# Patient Record
Sex: MALE | Race: WHITE | HISPANIC OR LATINO | Employment: UNEMPLOYED | URBAN - METROPOLITAN AREA
[De-identification: names, ages, dates, MRNs, and addresses within clinical notes are randomized per-mention and may not be internally consistent; named-entity substitution may affect disease eponyms.]

---

## 2017-10-01 ENCOUNTER — APPOINTMENT (EMERGENCY)
Dept: CT IMAGING | Facility: HOSPITAL | Age: 52
End: 2017-10-01
Payer: COMMERCIAL

## 2017-10-01 ENCOUNTER — HOSPITAL ENCOUNTER (EMERGENCY)
Facility: HOSPITAL | Age: 52
Discharge: HOME | End: 2017-10-01
Attending: EMERGENCY MEDICINE | Admitting: EMERGENCY MEDICINE
Payer: COMMERCIAL

## 2017-10-01 VITALS
BODY MASS INDEX: 47.2 KG/M2 | RESPIRATION RATE: 16 BRPM | HEART RATE: 78 BPM | DIASTOLIC BLOOD PRESSURE: 62 MMHG | TEMPERATURE: 97.3 F | HEIGHT: 64 IN | OXYGEN SATURATION: 97 % | SYSTOLIC BLOOD PRESSURE: 132 MMHG | WEIGHT: 276.46 LBS

## 2017-10-01 DIAGNOSIS — R10.9 ACUTE ABDOMINAL PAIN: Primary | ICD-10-CM

## 2017-10-01 LAB
ALBUMIN SERPL BCP-MCNC: 3.7 G/DL (ref 3.5–5)
ALP SERPL-CCNC: 77 U/L (ref 46–116)
ALT SERPL W P-5'-P-CCNC: 34 U/L (ref 12–78)
ANION GAP SERPL CALCULATED.3IONS-SCNC: 6 MMOL/L (ref 4–13)
AST SERPL W P-5'-P-CCNC: 10 U/L (ref 5–45)
BASOPHILS # BLD AUTO: 0.04 THOUSANDS/ΜL (ref 0–0.1)
BASOPHILS NFR BLD AUTO: 1 % (ref 0–1)
BILIRUB SERPL-MCNC: 0.2 MG/DL (ref 0.2–1)
BUN SERPL-MCNC: 15 MG/DL (ref 5–25)
CALCIUM SERPL-MCNC: 8.9 MG/DL (ref 8.3–10.1)
CHLORIDE SERPL-SCNC: 101 MMOL/L (ref 100–108)
CLARITY, POC: CLEAR
CO2 SERPL-SCNC: 28 MMOL/L (ref 21–32)
COLOR, POC: NORMAL
CREAT SERPL-MCNC: 1.27 MG/DL (ref 0.6–1.3)
EOSINOPHIL # BLD AUTO: 0.21 THOUSAND/ΜL (ref 0–0.61)
EOSINOPHIL NFR BLD AUTO: 3 % (ref 0–6)
ERYTHROCYTE [DISTWIDTH] IN BLOOD BY AUTOMATED COUNT: 13.2 % (ref 11.6–15.1)
EXT BILIRUBIN, UA: NEGATIVE
EXT BLOOD URINE: NEGATIVE
EXT GLUCOSE, UA: NEGATIVE
EXT KETONES: NEGATIVE
EXT NITRITE, UA: NEGATIVE
EXT PH, UA: 6
EXT PROTEIN, UA: NEGATIVE
EXT SPECIFIC GRAVITY, UA: 1.01
EXT UROBILINOGEN: 0.2
GFR SERPL CREATININE-BSD FRML MDRD: 65 ML/MIN/1.73SQ M
GLUCOSE SERPL-MCNC: 111 MG/DL (ref 65–140)
HCT VFR BLD AUTO: 44.4 % (ref 36.5–49.3)
HGB BLD-MCNC: 14.9 G/DL (ref 12–17)
LACTATE SERPL-SCNC: 1.2 MMOL/L (ref 0.5–2)
LIPASE SERPL-CCNC: 188 U/L (ref 73–393)
LYMPHOCYTES # BLD AUTO: 1.09 THOUSANDS/ΜL (ref 0.6–4.47)
LYMPHOCYTES NFR BLD AUTO: 13 % (ref 14–44)
MCH RBC QN AUTO: 28.2 PG (ref 26.8–34.3)
MCHC RBC AUTO-ENTMCNC: 33.6 G/DL (ref 31.4–37.4)
MCV RBC AUTO: 84 FL (ref 82–98)
MONOCYTES # BLD AUTO: 0.73 THOUSAND/ΜL (ref 0.17–1.22)
MONOCYTES NFR BLD AUTO: 9 % (ref 4–12)
NEUTROPHILS # BLD AUTO: 6.41 THOUSANDS/ΜL (ref 1.85–7.62)
NEUTS SEG NFR BLD AUTO: 74 % (ref 43–75)
PLATELET # BLD AUTO: 255 THOUSANDS/UL (ref 149–390)
PMV BLD AUTO: 9.5 FL (ref 8.9–12.7)
POTASSIUM SERPL-SCNC: 3.8 MMOL/L (ref 3.5–5.3)
PROT SERPL-MCNC: 7.5 G/DL (ref 6.4–8.2)
RBC # BLD AUTO: 5.29 MILLION/UL (ref 3.88–5.62)
SODIUM SERPL-SCNC: 135 MMOL/L (ref 136–145)
WBC # BLD AUTO: 8.48 THOUSAND/UL (ref 4.31–10.16)
WBC # BLD EST: NEGATIVE 10*3/UL

## 2017-10-01 PROCEDURE — 74177 CT ABD & PELVIS W/CONTRAST: CPT

## 2017-10-01 PROCEDURE — 93005 ELECTROCARDIOGRAM TRACING: CPT

## 2017-10-01 PROCEDURE — 83690 ASSAY OF LIPASE: CPT | Performed by: EMERGENCY MEDICINE

## 2017-10-01 PROCEDURE — 36415 COLL VENOUS BLD VENIPUNCTURE: CPT | Performed by: EMERGENCY MEDICINE

## 2017-10-01 PROCEDURE — 85025 COMPLETE CBC W/AUTO DIFF WBC: CPT | Performed by: EMERGENCY MEDICINE

## 2017-10-01 PROCEDURE — 80053 COMPREHEN METABOLIC PANEL: CPT | Performed by: EMERGENCY MEDICINE

## 2017-10-01 PROCEDURE — 96361 HYDRATE IV INFUSION ADD-ON: CPT

## 2017-10-01 PROCEDURE — 96375 TX/PRO/DX INJ NEW DRUG ADDON: CPT

## 2017-10-01 PROCEDURE — 96374 THER/PROPH/DIAG INJ IV PUSH: CPT

## 2017-10-01 PROCEDURE — 83605 ASSAY OF LACTIC ACID: CPT | Performed by: EMERGENCY MEDICINE

## 2017-10-01 PROCEDURE — 81002 URINALYSIS NONAUTO W/O SCOPE: CPT | Performed by: EMERGENCY MEDICINE

## 2017-10-01 PROCEDURE — 99284 EMERGENCY DEPT VISIT MOD MDM: CPT

## 2017-10-01 RX ORDER — ONDANSETRON 2 MG/ML
4 INJECTION INTRAMUSCULAR; INTRAVENOUS ONCE
Status: COMPLETED | OUTPATIENT
Start: 2017-10-01 | End: 2017-10-01

## 2017-10-01 RX ORDER — ONDANSETRON 4 MG/1
4 TABLET, ORALLY DISINTEGRATING ORAL EVERY 6 HOURS PRN
Qty: 10 TABLET | Refills: 0 | Status: SHIPPED | OUTPATIENT
Start: 2017-10-01 | End: 2018-10-06

## 2017-10-01 RX ORDER — DICYCLOMINE HCL 20 MG
20 TABLET ORAL EVERY 6 HOURS PRN
Qty: 12 TABLET | Refills: 0 | Status: SHIPPED | OUTPATIENT
Start: 2017-10-01 | End: 2018-10-06

## 2017-10-01 RX ADMIN — HYDROMORPHONE HYDROCHLORIDE 0.5 MG: 1 INJECTION, SOLUTION INTRAMUSCULAR; INTRAVENOUS; SUBCUTANEOUS at 20:34

## 2017-10-01 RX ADMIN — IOHEXOL 100 ML: 350 INJECTION, SOLUTION INTRAVENOUS at 21:22

## 2017-10-01 RX ADMIN — ONDANSETRON 4 MG: 2 INJECTION INTRAMUSCULAR; INTRAVENOUS at 20:34

## 2017-10-01 RX ADMIN — SODIUM CHLORIDE 1000 ML: 0.9 INJECTION, SOLUTION INTRAVENOUS at 20:34

## 2017-10-02 LAB
ATRIAL RATE: 76 BPM
P AXIS: 56 DEGREES
PR INTERVAL: 168 MS
QRS AXIS: 35 DEGREES
QRSD INTERVAL: 102 MS
QT INTERVAL: 358 MS
QTC INTERVAL: 402 MS
T WAVE AXIS: 20 DEGREES
VENTRICULAR RATE: 76 BPM

## 2017-10-02 NOTE — DISCHARGE INSTRUCTIONS
Abdominal Pain   WHAT YOU NEED TO KNOW:   Abdominal pain can be dull, achy, or sharp  You may have pain in one area of your abdomen, or in your entire abdomen  Your pain may be caused by a condition such as constipation, food sensitivity or poisoning, infection, or a blockage  Abdominal pain can also be from a hernia, appendicitis, or an ulcer  Liver, gallbladder, or kidney conditions can also cause abdominal pain  The cause of your abdominal pain may be unknown  DISCHARGE INSTRUCTIONS:   Return to the emergency department if:   · You have new chest pain or shortness of breath  · You have pulsing pain in your upper abdomen or lower back that suddenly becomes constant  · Your pain is in the right lower abdominal area and worsens with movement  · You have a fever over 100 4°F (38°C) or shaking chills  · You are vomiting and cannot keep food or liquids down  · Your pain does not improve or gets worse over the next 8 to 12 hours  · You see blood in your vomit or bowel movements, or they look black and tarry  · Your skin or the whites of your eyes turn yellow  · You are a woman and have a large amount of vaginal bleeding that is not your monthly period  Contact your healthcare provider if:   · You have pain in your lower back  · You are a man and have pain in your testicles  · You have pain when you urinate  · You have questions or concerns about your condition or care  Follow up with your healthcare provider within 24 hours or as directed:  Write down your questions so you remember to ask them during your visits  Medicines:   · Medicines  may be given to calm your stomach and prevent vomiting or to decrease pain  Ask how to take pain medicine safely  · Take your medicine as directed  Contact your healthcare provider if you think your medicine is not helping or if you have side effects  Tell him of her if you are allergic to any medicine   Keep a list of the medicines, vitamins, and herbs you take  Include the amounts, and when and why you take them  Bring the list or the pill bottles to follow-up visits  Carry your medicine list with you in case of an emergency  © 2017 [employer] Information is for End User's use only and may not be sold, redistributed or otherwise used for commercial purposes  All illustrations and images included in CareNotes® are the copyrighted property of [employer]  or [address]  The above information is an  only  It is not intended as medical advice for individual conditions or treatments  Talk to your doctor, nurse or pharmacist before following any medical regimen to see if it is safe and effective for you

## 2017-10-02 NOTE — ED NOTES
Pt resting in bed, wife at bedside, pt feels near complete pain relief with medications  Denies nausea  Continues bloated sensation  A/w results of CT        Ananya Moran RN  10/01/17 3295

## 2017-10-10 NOTE — ED PROVIDER NOTES
History  Chief Complaint   Patient presents with    Abdominal Pain     Pt presents to ED due to c/o constant epigastric pain moving to lower ABD quadrants then radiating to back  Starting around 11 pm last night  No relief after BM  Mild improvement when sitting or standing  Denies CP, SOB  Pt  Is a 47 yo M who presents to the ED w/ "pain that's ridiculous " Onset was "last night after 11 "   He gestures to the R upper abdomen & waves to the RLQ & relays the pain is "here down " Pt  Relays he felt very warm but does not have a fever  He passed 3-4 formed nonbloody stools without change in intense pain  He notes this feels somewhat like prior kidney stones (5 total, each of which passed without intervention)  He denies having any changes in urination  He has not had any dysuria, malodor or appreciated hematuria   + decreased appetite  PMHx: GERD, CAD (s/p MI ~12y ago), obesity & kidney stones  No known hx of gallbladder disease, pancreatitis, or intestinal problems  He has had a colonoscopy; no diverticuli were identified on this  Pt  Has not had anything for pain  Prior to Admission Medications   Prescriptions Last Dose Informant Patient Reported? Taking? Aspirin (ASPIR-81 PO)   Yes No   Sig: Take 81 mg by mouth daily Aspir-81 TBEC  Refills: 0  Active    Cholecalciferol (VITAMIN D PO)   Yes Yes   Sig: Take 5,000 Units by mouth daily   escitalopram (LEXAPRO) 10 mg tablet   Yes No   Sig: Lexapro 10 MG Oral Tablet  Refills: 0  Active   pantoprazole (PROTONIX) 40 mg   Yes No   Sig: Take 40 mg by mouth daily Protonix 40 MG Oral Packet  Refills: 0  Active       Facility-Administered Medications: None       Past Medical History:   Diagnosis Date    Anxiety     GERD (gastroesophageal reflux disease)     Kidney stone     MI (myocardial infarction)     Sleep apnea     cpap       Past Surgical History:   Procedure Laterality Date    KNEE ARTHROPLASTY         No family history on file    I have reviewed and agree with the history as documented  Social History   Substance Use Topics    Smoking status: Never Smoker    Smokeless tobacco: Not on file    Alcohol use No        Review of Systems   Musculoskeletal: Negative for back pain  All other systems reviewed and are negative  Physical Exam  ED Triage Vitals [10/01/17 1905]   Temperature Pulse Respirations Blood Pressure SpO2   (!) 97 3 °F (36 3 °C) 83 16 153/74 97 %      Temp Source Heart Rate Source Patient Position - Orthostatic VS BP Location FiO2 (%)   Oral Monitor Sitting Right arm --      Pain Score       9           Physical Exam   Constitutional: He is oriented to person, place, and time  He appears well-developed and well-nourished  Eyes: Conjunctivae and EOM are normal    Cardiovascular: Normal rate and regular rhythm  Pulmonary/Chest: Effort normal and breath sounds normal    Abdominal: Soft  Bowel sounds are normal  He exhibits no mass  There is tenderness (diffusely, maximal in the upper abdomen without lateralization)  There is no guarding  No CVAT   Neurological: He is alert and oriented to person, place, and time  Skin: Skin is warm and dry  No rash noted  Psychiatric: He has a normal mood and affect  His behavior is normal    Nursing note and vitals reviewed        ED Medications  Medications   sodium chloride 0 9 % bolus 1,000 mL (0 mL Intravenous Stopped 10/1/17 2209)   ondansetron (ZOFRAN) injection 4 mg (4 mg Intravenous Given 10/1/17 2034)   HYDROmorphone (DILAUDID) 1 mg/mL injection 0 5 mg (0 5 mg Intravenous Given 10/1/17 2034)   iohexol (OMNIPAQUE) 350 MG/ML injection (MULTI-DOSE) 100 mL (100 mL Intravenous Given 10/1/17 2122)       Diagnostic Studies  Labs Reviewed   CBC AND DIFFERENTIAL - Abnormal        Result Value Ref Range Status    Lymphocytes Relative 13 (*) 14 - 44 % Final    WBC 8 48  4 31 - 10 16 Thousand/uL Final    RBC 5 29  3 88 - 5 62 Million/uL Final    Hemoglobin 14 9  12 0 - 17 0 g/dL Final Hematocrit 44 4  36 5 - 49 3 % Final    MCV 84  82 - 98 fL Final    MCH 28 2  26 8 - 34 3 pg Final    MCHC 33 6  31 4 - 37 4 g/dL Final    RDW 13 2  11 6 - 15 1 % Final    MPV 9 5  8 9 - 12 7 fL Final    Platelets 981  172 - 390 Thousands/uL Final    Neutrophils Relative 74  43 - 75 % Final    Monocytes Relative 9  4 - 12 % Final    Eosinophils Relative 3  0 - 6 % Final    Basophils Relative 1  0 - 1 % Final    Neutrophils Absolute 6 41  1 85 - 7 62 Thousands/µL Final    Lymphocytes Absolute 1 09  0 60 - 4 47 Thousands/µL Final    Monocytes Absolute 0 73  0 17 - 1 22 Thousand/µL Final    Eosinophils Absolute 0 21  0 00 - 0 61 Thousand/µL Final    Basophils Absolute 0 04  0 00 - 0 10 Thousands/µL Final   COMPREHENSIVE METABOLIC PANEL - Abnormal     Sodium 135 (*) 136 - 145 mmol/L Final    Potassium 3 8  3 5 - 5 3 mmol/L Final    Chloride 101  100 - 108 mmol/L Final    CO2 28  21 - 32 mmol/L Final    Anion Gap 6  4 - 13 mmol/L Final    BUN 15  5 - 25 mg/dL Final    Creatinine 1 27  0 60 - 1 30 mg/dL Final    Comment: Standardized to IDMS reference method    Glucose 111  65 - 140 mg/dL Final    Comment:   If the patient is fasting, the ADA then defines impaired fasting glucose as > 100 mg/dL and diabetes as > or equal to 123 mg/dL  Specimen collection should occur prior to Sulfasalazine administration due to the potential for falsely depressed results  Specimen collection should occur prior to Sulfapyridine administration due to the potential for falsely elevated results  Calcium 8 9  8 3 - 10 1 mg/dL Final    AST 10  5 - 45 U/L Final    Comment:   Specimen collection should occur prior to Sulfasalazine administration due to the potential for falsely depressed results  ALT 34  12 - 78 U/L Final    Comment:   Specimen collection should occur prior to Sulfasalazine administration due to the potential for falsely depressed results       Alkaline Phosphatase 77  46 - 116 U/L Final    Total Protein 7 5  6 4 - 8 2 g/dL Final    Albumin 3 7  3 5 - 5 0 g/dL Final    Total Bilirubin 0 20  0 20 - 1 00 mg/dL Final    eGFR 65  ml/min/1 73sq m Final    Narrative:     National Kidney Disease Education Program recommendations are as follows:  GFR calculation is accurate only with a steady state creatinine  Chronic Kidney disease less than 60 ml/min/1 73 sq  meters  Kidney failure less than 15 ml/min/1 73 sq  meters  LIPASE - Normal    Lipase 188  73 - 393 u/L Final   LACTIC ACID, PLASMA - Normal    LACTIC ACID 1 2  0 5 - 2 0 mmol/L Final    Narrative:     Result may be elevated if tourniquet was used during collection  POCT URINALYSIS DIPSTICK - Normal    Color, UA Frieda   Final    Clarity, UA Clear   Final    EXT Glucose, UA Negative   Final    EXT Bilirubin, UA (Ref: Negative) Negative   Final    EXT Ketones, UA (Ref: Negative) Negative   Final    EXT Spec Grav, UA 1 015   Final    EXT Blood, UA (Ref: Negative) Negative   Final    EXT pH, UA 6 0   Final    EXT Protein, UA (Ref: Negative) Negative   Final    EXT Urobilinogen, UA (Ref: 0 2- 1 0) 0 2   Final    EXT Leukocytes, UA (Ref: Negative) Negative   Final    EXT Nitrite, UA (Ref: Negative) Negative   Final       CT abdomen pelvis with contrast   ED Interpretation   Report rendered by [facility]-no acute abnormality   identified within the abdomen or pelvis      Final Result         1  No acute abnormality in the abdomen or pelvis  2   Hepatic steatosis  3   Normal appendix  Findings are consistent with the preliminary report from [facility] which was provided shortly after completion of the exam                       Workstation performed: [id number]             Procedures  Procedures      Phone Contacts  ED Phone Contact    ED Course  ED Course as of Oct 10 1049   Sun Oct 01, 2017   2237 Patient feeling markedly improvedAt this time  He is taking fluids without difficulty  Study results reviewed  Etiology of intense abdominal discomfort unclear  Potentially related to abdominal intestinal cramping-sensitivity to a certain food or viral infection  Patient feels well for discharge  Prescriptions will be given for ondansetron and dicyclomine to use p troy Vazquez Patient to follow up with PCP if symptoms persist beyond the next couple of days  [facility] Time    Disposition  Final diagnoses:   Acute abdominal pain     ED Disposition     ED Disposition Condition Comment    Discharge  Ketan Melton discharge to home/self care  Condition at discharge: Good        Follow-up Information     Follow up With Specialties Details Why Contact Info    Irving Woods MD Internal Medicine  If symptoms persist beyond the next 1 to 2 days [address]  [phone]          Discharge Medication List as of 10/1/2017 10:42 PM      START taking these medications    Details   dicyclomine (BENTYL) 20 mg tablet Take 1 tablet by mouth every 6 (six) hours as needed (abdominal discomfort/ diarrhea), Starting Sun 10/1/2017, Print      ondansetron (ZOFRAN-ODT) 4 mg disintegrating tablet Take 1 tablet by mouth every 6 (six) hours as needed for nausea, Starting Sun 10/1/2017, Print         CONTINUE these medications which have NOT CHANGED    Details   Cholecalciferol (VITAMIN D PO) Take 5,000 Units by mouth daily, Historical Med      Aspirin (ASPIR-81 PO) Take 81 mg by mouth daily Aspir-81 TBEC  Refills: 0  Active , Until Discontinued, Historical Med      escitalopram (LEXAPRO) 10 mg tablet Lexapro 10 MG Oral Tablet  Refills: 0  Active, Until Discontinued, Historical Med      pantoprazole (PROTONIX) 40 mg Take 40 mg by mouth daily Protonix 40 MG Oral Packet  Refills: 0  Active , Until Discontinued, Historical Med           No discharge procedures on file      ED Provider  Electronically Signed by       Delfino Rebolledo MD  10/10/17 6008

## 2017-11-22 ENCOUNTER — TRANSCRIBE ORDERS (OUTPATIENT)
Dept: ADMINISTRATIVE | Facility: HOSPITAL | Age: 52
End: 2017-11-22

## 2017-11-22 DIAGNOSIS — G47.33 OBSTRUCTIVE SLEEP APNEA SYNDROME: Primary | ICD-10-CM

## 2018-01-11 ENCOUNTER — HOSPITAL ENCOUNTER (OUTPATIENT)
Dept: SLEEP CENTER | Facility: CLINIC | Age: 53
Discharge: HOME | End: 2018-01-12
Payer: COMMERCIAL

## 2018-01-11 DIAGNOSIS — G47.33 OBSTRUCTIVE SLEEP APNEA SYNDROME: ICD-10-CM

## 2018-01-11 PROCEDURE — 95811 POLYSOM 6/>YRS CPAP 4/> PARM: CPT

## 2018-01-13 NOTE — PROGRESS NOTES
Assessment    1  Sprain of medial collateral ligament of right knee, initial encounter (844 1) (G01 649D)    Plan   Ced Malik states that he had a pop and I created he has at least a grade 1 MCL sprain  I gave him a brace today for that as he has some instability  The ACL felt equivocal to me today  He will followup with me with a phone call if he fails to show improvement  He will continue with physical therapy  We will get an MRI if he fails to show improvement to assess the ACL  Discussion/Summary  The patient was counseled regarding instructions for management, prognosis, patient and family education, impressions, risks and benefits of treatment options, importance of compliance with treatment  Chief Complaint    1  Knee Injury    History of Present Illness   Ced Malik returns to see me today and unfortunately had a slip and fall today on a wet for her  He has had a sharp and severe pain  He had immediate swelling  He had a giving way episode and had a valgus stress  He felt a pop about the medial aspect of the knee and has pain there  He recently had a medial meniscectomy which went well he was doing quite well with that with physical therapy but a portion that had a setback  Review of Systems    Constitutional: No fever or chills, feels well, no tiredness, no recent weight loss or weight gain  Eyes: No complaints of red eyes, no eyesight problems  ENT: no complaints of loss of hearing, no nosebleeds, no sore throat  Cardiovascular: No complaints of chest pain, no palpitations, no leg claudication or lower extremity edema  Respiratory: No complaints of shortness of breath, no wheezing, no cough  Gastrointestinal: No complaints of abdominal pain, no constipation, no nausea or vomiting, no diarrhea or bloody stools  Genitourinary: No complaints of dysuria or incontinence, no hesitancy, no nocturia  Musculoskeletal: as noted in HPI     Integumentary: No complaints of skin rash or lesion, no itching or dry skin, no skin wounds  Neurological: No complaints of headache, no confusion, no numbness or tingling, no dizziness  Psychiatric: No suicidal thoughts, no anxiety, no depression  Endocrine: No muscle weakness, no frequent urination, no excessive thirst, no feelings of weakness  Active Problems    1  Congenital pes planus (754 61) (Q66 50)   2  Difficulty in walking (719 7) (R26 2)   3  Enthesopathy of lower extremity (726 8) (M76 9)   4  Plantar fasciitis (728 71) (M72 2)   5  Right knee pain (719 46) (M25 561)   6  Tear of medial meniscus of right knee, subsequent encounter (V58 89,836 0)   (L24 117I)    Past Medical History    The active problems and past medical history were reviewed and updated today  Surgical History    The surgical history was reviewed and updated today  Family History    The family history was reviewed and updated today  Social History  The social history was reviewed and updated today  Current Meds   1  Aspir-81 TBEC; Therapy: (Recorded:18Nov2014) to Recorded   2  Lexapro 10 MG Oral Tablet (Escitalopram Oxalate); Therapy: (Recorded:18Nov2014) to Recorded   3  Meloxicam 7 5 MG Oral Tablet; TAKE 1 TO 2 TABLETS DAILY; Therapy: [id number] to (Evaluate:17Jan2015)  Requested for: [id number]; Last   Rx:18Nov2014 Ordered   4  Protonix 40 MG Oral Packet; Therapy: (Recorded:18Nov2014) to Recorded    The medication list was reviewed and updated today  Allergies    1  Erythromycin TABS    Physical Exam    Right Knee: Appearance: an effusion grade 2  Surgical incision was clean, dry, and intact  Tenderness: diffuse medial knee  Flexion: painful restricted AROM 90 degrees  Extension: painless normal AROM  Flexion was 4/5  Extension was 4/5  Special Test: equivocal Anterior Drawer, equivocal Lachman's test and positive laxity on Valgus Stress (1), but negative Posterior Drawer sign and no laxity on Varus Stress     Constitutional - General appearance: Normal    Musculoskeletal - Gait and station: Abnormal  Gait evaluation demonstrated antalgia on the right  Digits and nails: Normal  Muscle strength/tone: Normal  Lower extremity compartments: Normal    Cardiovascular - Pulses: Normal  Examination of extremities for edema and/or varicosities: Normal    Skin - Skin and subcutaneous tissue: Normal    Neurologic - Sensation: Normal    Psychiatric - Orientation to person, place, and time: Normal  Mood and affect: Normal    Eyes   Conjunctiva and lids: Normal     Pupils and irises: Normal        Future Appointments    Date/Time Provider Specialty Site   02/08/2016 09:30 AM ROBERT Mendenhall   Orthopedic Surgery [facility] 46     Signatures   Electronically signed by : ROBERT Frias ; Jan 22 2016  2:00PM EST                       (Author)

## 2018-01-17 ENCOUNTER — TRANSCRIBE ORDERS (OUTPATIENT)
Dept: SLEEP CENTER | Facility: CLINIC | Age: 53
End: 2018-01-17

## 2018-01-17 DIAGNOSIS — G47.33 OSA (OBSTRUCTIVE SLEEP APNEA): Primary | ICD-10-CM

## 2018-01-25 ENCOUNTER — OFFICE VISIT (OUTPATIENT)
Dept: SLEEP CENTER | Facility: CLINIC | Age: 53
End: 2018-01-25

## 2018-04-23 ENCOUNTER — TRANSCRIBE ORDERS (OUTPATIENT)
Dept: ADMINISTRATIVE | Facility: HOSPITAL | Age: 53
End: 2018-04-23

## 2018-04-23 DIAGNOSIS — R22.32 LUMP OF SKIN OF LEFT UPPER EXTREMITY: Primary | ICD-10-CM

## 2018-10-06 ENCOUNTER — HOSPITAL ENCOUNTER (EMERGENCY)
Facility: HOSPITAL | Age: 53
Discharge: HOME | End: 2018-10-06
Attending: EMERGENCY MEDICINE | Admitting: EMERGENCY MEDICINE
Payer: COMMERCIAL

## 2018-10-06 VITALS
WEIGHT: 277 LBS | RESPIRATION RATE: 20 BRPM | BODY MASS INDEX: 47.55 KG/M2 | OXYGEN SATURATION: 98 % | HEART RATE: 88 BPM | SYSTOLIC BLOOD PRESSURE: 114 MMHG | TEMPERATURE: 98.5 F | DIASTOLIC BLOOD PRESSURE: 57 MMHG

## 2018-10-06 DIAGNOSIS — K04.7 DENTAL ABSCESS: Primary | ICD-10-CM

## 2018-10-06 PROCEDURE — 99282 EMERGENCY DEPT VISIT SF MDM: CPT

## 2018-10-06 RX ORDER — BUPIVACAINE HYDROCHLORIDE 5 MG/ML
10 INJECTION, SOLUTION EPIDURAL; INTRACAUDAL ONCE
Status: COMPLETED | OUTPATIENT
Start: 2018-10-06 | End: 2018-10-06

## 2018-10-06 RX ORDER — LIDOCAINE HYDROCHLORIDE AND EPINEPHRINE 10; 10 MG/ML; UG/ML
10 INJECTION, SOLUTION INFILTRATION; PERINEURAL ONCE
Status: COMPLETED | OUTPATIENT
Start: 2018-10-06 | End: 2018-10-06

## 2018-10-06 RX ORDER — HYDROCODONE BITARTRATE AND ACETAMINOPHEN 5; 325 MG/1; MG/1
1-2 TABLET ORAL EVERY 6 HOURS PRN
Qty: 16 TABLET | Refills: 0 | Status: SHIPPED | OUTPATIENT
Start: 2018-10-06 | End: 2018-10-16

## 2018-10-06 RX ADMIN — LIDOCAINE HYDROCHLORIDE AND EPINEPHRINE 10 ML: 10; 10 INJECTION, SOLUTION INFILTRATION; PERINEURAL at 23:29

## 2018-10-06 RX ADMIN — BUPIVACAINE HYDROCHLORIDE 10 ML: 5 INJECTION, SOLUTION EPIDURAL; INTRACAUDAL at 23:29

## 2018-10-07 NOTE — ED PROVIDER NOTES
History  Chief Complaint   Patient presents with    Dental Pain     pt c o severe tooth pain for the past two days  pt schedule an extration on the 12th  Billy Mosnon ([id number]) is a 48 y o   male Adult patient, presenting to the Emergency Department, accompanied by Wife, who presents with a chief complaint of Patient presents with: Dental Pain: pt c o severe tooth pain for the past two days  pt schedule an extration on the 12th  Dental Pain  -Patients concern is that his right throat has pain with swallowing  [facility]  -Started on motrin 800mg and amoxicillin  -Since 2pm, patient has taken 2 motrin and one amoxicillin  -First started yesterday  -Patient did not fracture tooth  -Patient has a regular dentist, [facility]  -Patient is due to have extractions on Oct 12    Medications: protonix, lexapro, Vit D, fish oil, aspirin  Allergies: No reported food allergies, No reported environmental allergies, Reported drug alergies include: Erythromycin - GI distress  Overnight Hospitalizations: No prior hospitalizations  Vaccinations: Vaccinations UTD, as per Patient/Parent  Past Medical History: Past Medical History Includes: GERD, MDD, prior mild MI, HLD  Past Surgical History: Past Surgical History Includes: right knee orthopedic, vasectomy    Code Status: No Order              Prior to Admission Medications   Prescriptions Last Dose Informant Patient Reported? Taking?    Aspirin (ASPIR-81 PO)   Yes Yes   Sig: Take 81 mg by mouth daily Aspir-81 TBEC  Refills: 0  Active    Cholecalciferol (VITAMIN D PO)   Yes Yes   Sig: Take 5,000 Units by mouth daily   Omega-3 Fatty Acids (FISH OIL) 1200 MG CPDR   Yes Yes   Sig: Take by mouth   escitalopram (LEXAPRO) 10 mg tablet   Yes Yes   Sig: Lexapro 10 MG Oral Tablet  Refills: 0  Active   pantoprazole (PROTONIX) 40 mg   Yes Yes   Sig: Take 40 mg by mouth daily Protonix 40 MG Oral Packet  Refills: 0  Active Facility-Administered Medications: None       Past Medical History:   Diagnosis Date    Anxiety     GERD (gastroesophageal reflux disease)     Kidney stone     MI (myocardial infarction) ([facility] [address] )     Sleep apnea     cpap       Past Surgical History:   Procedure Laterality Date    KNEE ARTHROPLASTY         History reviewed  No pertinent family history  I have reviewed and agree with the history as documented  Social History   Substance Use Topics    Smoking status: Never Smoker    Smokeless tobacco: Never Used    Alcohol use No        Review of Systems  Review of Systems: The Patient/Parent Denies the following: Negative, Except as noted in HPI  Physical Exam  Physical Exam  General: 48 y o  male patient, who appears their stated age, in mild distress  Skin: No rashes, masses, or lesions noted  HEENT: Atraumatic & Normocephalic  External ears normal, with no noted abnormalities or deformities  Bilateral canals examined, without noted edema or discomfort  No pain while pulling the tragus  TM well visualized bilaterally, with no noted obstruction, effusion, erythema, or air fluid levels  No noted enlargement of the mastoid processes bilaterally  EOMI, PERRL, Conjunctiva without injection bilaterally  No conjunctival drainage noted bilaterally  Nares patent bilaterally, with no noted obstructions, erythema, or drainage  No noted rhinorrhea  Pharynx well visualized, with no exudate noted in the posterior pharynx  Tonsils are not enlarged  There is a noted dental abscess present in the right lower gingival surface, immediately inferior to the 2nd molar  Neck: Soft, supple, and non-tender  No enlargement of the anterior cervical, posterior cervical, or occipital lymph notes  Cardiac: Regular rate and rhythm, with no noted murmurs, rubs, or gallops  Pulmonary: Normal Appearance  Clear to auscultation, with no noted rales, rhonchi, or wheezes  Abdomen: Normal appearance   Dull to palpation, except over the gastric bubble, which was mildly tympanic  Bowel sounds were within normal limits, with no noted high pitch sounds heard  Vital Signs  ED Triage Vitals [10/06/18 2223]   Temperature Pulse Respirations Blood Pressure SpO2   98 5 °F (36 9 °C) 88 20 114/57 98 %      Temp Source Heart Rate Source Patient Position - Orthostatic VS BP Location FiO2 (%)   Oral Monitor -- -- --      Pain Score       Worst Possible Pain           Vitals:    10/06/18 2223   BP: 114/57   Pulse: 88       Visual Acuity      ED Medications  Medications   bupivacaine (PF) (MARCAINE) 0 5 % injection 10 mL (10 mL Infiltration Given by Other 10/6/18 3325)   lidocaine-epinephrine (XYLOCAINE/EPINEPHRINE) 1 %-1:100,000 injection 10 mL (10 mL Infiltration Given by Other 10/6/18 8137)       Diagnostic Studies  Results Reviewed     None                 No orders to display              Procedures  Nerve Block  Date/Time: 10/6/2018 11:25 PM  Performed by: Dea Sharp by: Annette Rivas     Patient location:  ED  Other Assisting Provider: No    Consent:     Consent obtained:  Verbal    Consent given by:  Patient    Risks discussed: Allergic reaction, bleeding, intravenous injection, infection, nerve damage, pain, swelling and unsuccessful block    Alternatives discussed:  No treatment, delayed treatment, alternative treatment and referral  Universal protocol:     Procedure explained and questions answered to patient or proxy's satisfaction: yes      Relevant documents present and verified: yes      Test results available and properly labeled: yes     Radiology Images displayed and confirmed   If images not available, report reviewed: yes      Required blood products, implants, devices, and special equipment available: yes      Patient identity confirmed:  Verbally with patient, arm band, provided demographic data and hospital-assigned identification number  Indications:     Indications:  Pain relief  Location:     Body area: Head    Head nerve blocked: Right mandibular block  Nerve type:  Peripheral    Laterality:  Right  Procedure details (see MAR for exact dosages): Block needle gauge:  27 G    Anesthetic injected:  Bupivacaine 0 5% w/o epi and lidocaine 1% WITH epi    Steroid injected:  None    Additive injected:  None    Injection procedure:  Anatomic landmarks identified, anatomic landmarks palpated, introduced needle, incremental injection and negative aspiration for blood  Post-procedure details:     Dressing:  None    Outcome:  Anesthesia achieved    Patient tolerance of procedure: Tolerated well, no immediate complications           Phone Contacts  ED Phone Contact    ED Course                               MDM  Number of Diagnoses or Management Options  Dental abscess: new and requires workup  Diagnosis management comments: Medical Decision Making:   Most likely diagnosis is dental abscess of the right lower second molar  Primary consideration diagnosis include tenderness of the patient's gingival surface, in the area of question, the patients poor dental health, the presence of a tender fluctuant mass on the patient's gentle surface, in the area of question  In addition, the patient is not currently demonstrating since this sepsis, include systemic fever, malaise, or alterations to bowel or bladder function  Plan is to provide this patient with antibiotics for treatment of this abscess, as well as instructions to follow up with their dentist for definitive treatment  Additionally, pain management options were discussed with the patient, including providing a dental block here in the emergency department  The patient was amenable to this plan, and, as per the associated procedure note, a dental block was performed, which resulted in a near complete resolution of symptoms  The patient was instructed that this block was only temporary, and that definitive dental care will be required           Amount and/or Complexity of Data Reviewed  Clinical lab tests: reviewed  Tests in the radiology section of CPT®: reviewed  Tests in the medicine section of CPT®: reviewed  Obtain history from someone other than the patient: yes  Review and summarize past medical records: yes  Discuss the patient with other providers: yes  Independent visualization of images, tracings, or specimens: yes    Risk of Complications, Morbidity, and/or Mortality  Presenting problems: moderate  Diagnostic procedures: moderate  Management options: moderate    Patient Progress  Patient progress: improved    CritCare Time    Disposition  Final diagnoses:   Dental abscess     Time reflects when diagnosis was documented in both MDM as applicable and the Disposition within this note     Time User Action Codes Description Comment    10/6/2018 11:31 PM Luiz Comfort Add [K04 7] Dental abscess       ED Disposition     ED Disposition Condition Comment    Discharge  Sydell Lev discharge to home/self care  Condition at discharge: Good        Follow-up Information     Follow up With Specialties Details Why Contact Info    Cal Saldaña MD Internal Medicine In 3 days If symptoms worsen, As needed [address] [facility]  [address] [facility]  [id number] [place] [address] [phone]      [address] Adult and [address] [facility]  In 3 days  [address]  [address]  [phone]          Patient's Medications   Discharge Prescriptions    HYDROCODONE-ACETAMINOPHEN (NORCO) 5-325 MG PER TABLET    Take 1-2 tablets by mouth every 6 (six) hours as needed for pain for up to 10 days Max Daily Amount: 8 tablets       Start Date: 10/6/2018 End Date: 10/16/2018       Order Dose: 1-2 tablets       Quantity: 16 tablet    Refills: 0     No discharge procedures on file      ED Provider  Electronically Signed by           Fabien Brambila PA-C  10/06/18 9884

## 2018-10-07 NOTE — DISCHARGE INSTRUCTIONS
Dental Abscess   WHAT YOU NEED TO KNOW:   A dental abscess is a collection of pus in or around a tooth  A dental abscess is caused by bacteria  The bacteria usually enter the tooth when the enamel (outer part of the tooth) is damaged by tooth decay  Bacteria may also enter the tooth through a break or chip in the tooth, or a cut in the gum  Food particles that are stuck between the teeth for a long time may also lead to an abscess  DISCHARGE INSTRUCTIONS:   Return to the emergency department if:   · You have severe pain  · You have trouble breathing because of pain or swelling  Contact your healthcare provider if:   · Your symptoms get worse, even after treatment  · Your mouth is bleeding  · You cannot eat or drink because of pain or swelling  · Your abscess returns  · You have an injury that causes a crack in your tooth  · You have questions or concerns about your condition or care  Medicines: You may  need any of the following:  · Antibiotics  help treat a bacterial infection  · NSAIDs , such as ibuprofen, help decrease swelling, pain, and fever  This medicine is available with or without a doctor's order  NSAIDs can cause stomach bleeding or kidney problems in certain people  If you take blood thinner medicine, always ask your healthcare provider if NSAIDs are safe for you  Always read the medicine label and follow directions  · Acetaminophen  decreases pain and fever  It is available without a doctor's order  Ask how much to take and how often to take it  Follow directions  Read the labels of all other medicines you are using to see if they also contain acetaminophen, or ask your doctor or pharmacist  Acetaminophen can cause liver damage if not taken correctly  Do not use more than 4 grams (4,000 milligrams) total of acetaminophen in one day  · Prescription pain medicine  may be given  Ask your healthcare provider how to take this medicine safely   Some prescription pain medicines contain acetaminophen  Do not take other medicines that contain acetaminophen without talking to your healthcare provider  Too much acetaminophen may cause liver damage  Prescription pain medicine may cause constipation  Ask your healthcare provider how to prevent or treat constipation  · Take your medicine as directed  Contact your healthcare provider if you think your medicine is not helping or if you have side effects  Tell him of her if you are allergic to any medicine  Keep a list of the medicines, vitamins, and herbs you take  Include the amounts, and when and why you take them  Bring the list or the pill bottles to follow-up visits  Carry your medicine list with you in case of an emergency  Self-care:   · Rinse your mouth every 2 hours with salt water  This will help keep the area clean  · Gently brush your teeth twice a day with a soft tooth brush  This will help keep the area clean  · Eat soft foods as directed  Soft foods may cause less pain  Examples include applesauce, yogurt, and cooked pasta  Ask your healthcare provider how long to follow this instruction  · Apply a warm compress to your tooth or gum  Use a cotton ball or gauze soaked in warm water  Remove the compress in 10 minutes or when it becomes cool  Repeat 3 times a day  Prevent another abscess:   · Brush your teeth at least 2 times a day with fluoride toothpaste  · Use dental floss to clean between your teeth at least once a day  · Rinse your mouth with water or mouthwash after meals and snacks  · Chew sugarless gum after meals and snacks  · Limit foods that are sticky and high in sugar such as raisons  Also limit drinks high in sugar, such as soda  · See your dentist every 6 months for dental cleanings and oral exams  Follow up with your healthcare provider in 24 hours: Your healthcare provider will need to check your teeth and gums   Write down your questions so you remember to ask them during your visits  © 2017 [employer] Information is for End User's use only and may not be sold, redistributed or otherwise used for commercial purposes  All illustrations and images included in CareNotes® are the copyrighted property of A D A M [employer] Inc  or [employer]  The above information is an  only  It is not intended as medical advice for individual conditions or treatments  Talk to your doctor, nurse or pharmacist before following any medical regimen to see if it is safe and effective for you

## 2018-10-07 NOTE — ED NOTES
[facility] at [facility] earlier today, started on amoxicillin and given 800 mg of Motrin   Pain back worse than before but in a different area      Jesse Torrez RN  10/06/18 4141

## 2019-05-22 ENCOUNTER — APPOINTMENT (OUTPATIENT)
Dept: RADIOLOGY | Facility: CLINIC | Age: 54
End: 2019-05-22
Payer: COMMERCIAL

## 2019-05-22 ENCOUNTER — OFFICE VISIT (OUTPATIENT)
Dept: OBGYN CLINIC | Facility: CLINIC | Age: 54
End: 2019-05-22
Payer: COMMERCIAL

## 2019-05-22 VITALS
SYSTOLIC BLOOD PRESSURE: 122 MMHG | DIASTOLIC BLOOD PRESSURE: 72 MMHG | HEART RATE: 97 BPM | HEIGHT: 64 IN | WEIGHT: 279.2 LBS | BODY MASS INDEX: 47.66 KG/M2

## 2019-05-22 DIAGNOSIS — M25.561 RIGHT KNEE PAIN, UNSPECIFIED CHRONICITY: ICD-10-CM

## 2019-05-22 DIAGNOSIS — M79.641 BILATERAL HAND PAIN: ICD-10-CM

## 2019-05-22 DIAGNOSIS — M17.11 PRIMARY OSTEOARTHRITIS OF RIGHT KNEE: Primary | ICD-10-CM

## 2019-05-22 DIAGNOSIS — M79.642 BILATERAL HAND PAIN: ICD-10-CM

## 2019-05-22 PROCEDURE — 99214 OFFICE O/P EST MOD 30 MIN: CPT | Performed by: ORTHOPAEDIC SURGERY

## 2019-05-22 PROCEDURE — 73562 X-RAY EXAM OF KNEE 3: CPT

## 2019-05-31 ENCOUNTER — TELEPHONE (OUTPATIENT)
Dept: BARIATRICS | Facility: CLINIC | Age: 54
End: 2019-05-31

## 2019-05-31 PROBLEM — K21.9 GERD (GASTROESOPHAGEAL REFLUX DISEASE): Status: ACTIVE | Noted: 2019-05-31

## 2019-05-31 PROBLEM — E66.01 OBESITY, CLASS III, BMI 40-49.9 (MORBID OBESITY) (HCC): Status: ACTIVE | Noted: 2019-05-31

## 2019-05-31 PROBLEM — M17.11 OSTEOARTHRITIS OF RIGHT KNEE: Status: ACTIVE | Noted: 2019-05-31

## 2019-05-31 PROBLEM — E66.813 OBESITY, CLASS III, BMI 40-49.9 (MORBID OBESITY): Status: ACTIVE | Noted: 2019-05-31

## 2019-05-31 PROBLEM — E78.5 HYPERLIPIDEMIA: Status: ACTIVE | Noted: 2019-05-31

## 2019-08-29 ENCOUNTER — APPOINTMENT (OUTPATIENT)
Dept: LAB | Facility: CLINIC | Age: 54
End: 2019-08-29
Payer: COMMERCIAL

## 2019-08-29 ENCOUNTER — TRANSCRIBE ORDERS (OUTPATIENT)
Dept: LAB | Facility: CLINIC | Age: 54
End: 2019-08-29

## 2019-08-29 DIAGNOSIS — R10.9 ABDOMINAL PAIN, UNSPECIFIED ABDOMINAL LOCATION: Primary | ICD-10-CM

## 2019-08-29 DIAGNOSIS — R10.9 ABDOMINAL PAIN, UNSPECIFIED ABDOMINAL LOCATION: ICD-10-CM

## 2019-08-29 LAB
ALBUMIN SERPL BCP-MCNC: 3.8 G/DL (ref 3.5–5)
ALP SERPL-CCNC: 81 U/L (ref 46–116)
ALT SERPL W P-5'-P-CCNC: 33 U/L (ref 12–78)
AMYLASE SERPL-CCNC: 74 IU/L (ref 25–115)
ANION GAP SERPL CALCULATED.3IONS-SCNC: 8 MMOL/L (ref 4–13)
AST SERPL W P-5'-P-CCNC: 19 U/L (ref 5–45)
BASOPHILS # BLD AUTO: 0.06 THOUSANDS/ΜL (ref 0–0.1)
BASOPHILS NFR BLD AUTO: 1 % (ref 0–1)
BILIRUB SERPL-MCNC: 0.3 MG/DL (ref 0.2–1)
BUN SERPL-MCNC: 18 MG/DL (ref 5–25)
CALCIUM SERPL-MCNC: 9.3 MG/DL (ref 8.3–10.1)
CHLORIDE SERPL-SCNC: 102 MMOL/L (ref 100–108)
CO2 SERPL-SCNC: 27 MMOL/L (ref 21–32)
CREAT SERPL-MCNC: 1.05 MG/DL (ref 0.6–1.3)
EOSINOPHIL # BLD AUTO: 0.24 THOUSAND/ΜL (ref 0–0.61)
EOSINOPHIL NFR BLD AUTO: 3 % (ref 0–6)
ERYTHROCYTE [DISTWIDTH] IN BLOOD BY AUTOMATED COUNT: 13.2 % (ref 11.6–15.1)
GFR SERPL CREATININE-BSD FRML MDRD: 80 ML/MIN/1.73SQ M
GLUCOSE SERPL-MCNC: 120 MG/DL (ref 65–140)
HCT VFR BLD AUTO: 47.7 % (ref 36.5–49.3)
HGB BLD-MCNC: 15.8 G/DL (ref 12–17)
IMM GRANULOCYTES # BLD AUTO: 0.07 THOUSAND/UL (ref 0–0.2)
IMM GRANULOCYTES NFR BLD AUTO: 1 % (ref 0–2)
LIPASE SERPL-CCNC: 184 U/L (ref 73–393)
LYMPHOCYTES # BLD AUTO: 1.08 THOUSANDS/ΜL (ref 0.6–4.47)
LYMPHOCYTES NFR BLD AUTO: 13 % (ref 14–44)
MCH RBC QN AUTO: 28.9 PG (ref 26.8–34.3)
MCHC RBC AUTO-ENTMCNC: 33.1 G/DL (ref 31.4–37.4)
MCV RBC AUTO: 87 FL (ref 82–98)
MONOCYTES # BLD AUTO: 0.62 THOUSAND/ΜL (ref 0.17–1.22)
MONOCYTES NFR BLD AUTO: 8 % (ref 4–12)
NEUTROPHILS # BLD AUTO: 6.23 THOUSANDS/ΜL (ref 1.85–7.62)
NEUTS SEG NFR BLD AUTO: 74 % (ref 43–75)
NRBC BLD AUTO-RTO: 0 /100 WBCS
PLATELET # BLD AUTO: 246 THOUSANDS/UL (ref 149–390)
PMV BLD AUTO: 9.4 FL (ref 8.9–12.7)
POTASSIUM SERPL-SCNC: 4.1 MMOL/L (ref 3.5–5.3)
PROT SERPL-MCNC: 7.9 G/DL (ref 6.4–8.2)
RBC # BLD AUTO: 5.47 MILLION/UL (ref 3.88–5.62)
SODIUM SERPL-SCNC: 137 MMOL/L (ref 136–145)
WBC # BLD AUTO: 8.3 THOUSAND/UL (ref 4.31–10.16)

## 2019-08-29 PROCEDURE — 85025 COMPLETE CBC W/AUTO DIFF WBC: CPT

## 2019-08-29 PROCEDURE — 36415 COLL VENOUS BLD VENIPUNCTURE: CPT

## 2019-08-29 PROCEDURE — 83690 ASSAY OF LIPASE: CPT

## 2019-08-29 PROCEDURE — 80053 COMPREHEN METABOLIC PANEL: CPT

## 2019-08-29 PROCEDURE — 82150 ASSAY OF AMYLASE: CPT

## 2019-11-15 ENCOUNTER — APPOINTMENT (OUTPATIENT)
Dept: RADIOLOGY | Facility: CLINIC | Age: 54
End: 2019-11-15
Payer: COMMERCIAL

## 2019-11-15 ENCOUNTER — OFFICE VISIT (OUTPATIENT)
Dept: OBGYN CLINIC | Facility: CLINIC | Age: 54
End: 2019-11-15
Payer: COMMERCIAL

## 2019-11-15 VITALS
BODY MASS INDEX: 49.22 KG/M2 | SYSTOLIC BLOOD PRESSURE: 137 MMHG | HEIGHT: 63 IN | DIASTOLIC BLOOD PRESSURE: 79 MMHG | HEART RATE: 101 BPM | WEIGHT: 277.8 LBS

## 2019-11-15 DIAGNOSIS — G89.29 CHRONIC PAIN OF BOTH KNEES: ICD-10-CM

## 2019-11-15 DIAGNOSIS — M25.562 CHRONIC PAIN OF BOTH KNEES: ICD-10-CM

## 2019-11-15 DIAGNOSIS — M25.561 CHRONIC PAIN OF BOTH KNEES: ICD-10-CM

## 2019-11-15 DIAGNOSIS — M25.562 CHRONIC PAIN OF LEFT KNEE: ICD-10-CM

## 2019-11-15 DIAGNOSIS — G89.29 CHRONIC PAIN OF LEFT KNEE: ICD-10-CM

## 2019-11-15 DIAGNOSIS — M17.0 PRIMARY OSTEOARTHRITIS OF BOTH KNEES: Primary | ICD-10-CM

## 2019-11-15 PROCEDURE — 73562 X-RAY EXAM OF KNEE 3: CPT

## 2019-11-15 PROCEDURE — 99214 OFFICE O/P EST MOD 30 MIN: CPT | Performed by: ORTHOPAEDIC SURGERY

## 2019-11-15 PROCEDURE — 20610 DRAIN/INJ JOINT/BURSA W/O US: CPT | Performed by: ORTHOPAEDIC SURGERY

## 2019-11-15 RX ORDER — TRIAMCINOLONE ACETONIDE 40 MG/ML
80 INJECTION, SUSPENSION INTRA-ARTICULAR; INTRAMUSCULAR
Status: COMPLETED | OUTPATIENT
Start: 2019-11-15 | End: 2019-11-15

## 2019-11-15 RX ORDER — BUPIVACAINE HYDROCHLORIDE 5 MG/ML
6 INJECTION, SOLUTION EPIDURAL; INTRACAUDAL
Status: COMPLETED | OUTPATIENT
Start: 2019-11-15 | End: 2019-11-15

## 2019-11-15 RX ADMIN — BUPIVACAINE HYDROCHLORIDE 6 ML: 5 INJECTION, SOLUTION EPIDURAL; INTRACAUDAL at 10:08

## 2019-11-15 RX ADMIN — BUPIVACAINE HYDROCHLORIDE 6 ML: 5 INJECTION, SOLUTION EPIDURAL; INTRACAUDAL at 10:09

## 2019-11-15 RX ADMIN — TRIAMCINOLONE ACETONIDE 80 MG: 40 INJECTION, SUSPENSION INTRA-ARTICULAR; INTRAMUSCULAR at 10:09

## 2019-11-15 RX ADMIN — TRIAMCINOLONE ACETONIDE 80 MG: 40 INJECTION, SUSPENSION INTRA-ARTICULAR; INTRAMUSCULAR at 10:08

## 2019-11-15 NOTE — PROGRESS NOTES
Assessment/Plan:  1  Primary osteoarthritis of both knees     2  Chronic pain of both knees  XR knee 3 vw left non injury   3  BMI 45 0-49 9, adult [facility])  Ambulatory referral to Bariatric Surgery     Scribe Attestation    I,:   Kenna Garland am acting as a scribe while in the presence of the attending physician :        I,:   Efrem Whitley,  personally performed the services described in this documentation    as scribed in my presence :          Candie Welsh is a very pleasant 51-year-old male who presents today for initial evaluation of chronic bilateral knee pain  After reviewing his imaging and performing a thorough history and physical exam I explained that he is suffering with osteoarthritis bilaterally in his knees  We discussed conservative treatment strategies today and I offered bilateral corticosteroid injections pain relief  He was amenable to this and the injections were administered in the office today without issue and tolerated well by the patient  Post injection instructions were provided  He was also fit with new bilateral hinged knee braces by our DME fitter in the office today  Additionally, we discussed weight management and I provided him with a referral to bariatric surgery  We will see him back in 3-4 months pending the efficacy of today's corticosteroid injection      Large joint arthrocentesis: L knee  Date/Time: 11/15/2019 10:08 AM  Consent given by: patient  Site marked: site marked  Timeout: Immediately prior to procedure a time out was called to verify the correct patient, procedure, equipment, support staff and site/side marked as required   Supporting Documentation  Indications: pain   Procedure Details  Location: knee - L knee  Preparation: Patient was prepped and draped in the usual sterile fashion  Needle size: 20 G  Ultrasound guidance: no  Approach: anterolateral  Medications administered: 6 mL bupivacaine (PF) 0 5 %; 80 mg triamcinolone acetonide 40 mg/mL    Patient tolerance: patient tolerated the procedure well with no immediate complications  Dressing:  Sterile dressing applied    Large joint arthrocentesis: R knee  Date/Time: 11/15/2019 10:09 AM  Consent given by: patient  Site marked: site marked  Timeout: Immediately prior to procedure a time out was called to verify the correct patient, procedure, equipment, support staff and site/side marked as required   Supporting Documentation  Indications: pain   Procedure Details  Location: knee - R knee  Preparation: Patient was prepped and draped in the usual sterile fashion  Needle size: 20 G  Ultrasound guidance: no  Approach: anterolateral  Medications administered: 6 mL bupivacaine (PF) 0 5 %; 80 mg triamcinolone acetonide 40 mg/mL    Patient tolerance: patient tolerated the procedure well with no immediate complications  Dressing:  Sterile dressing applied        Subjective:   Initial evaluation for chronic bilateral knee pain    Patient ID: Bronwyn Rust is a 47 y o  male  HPI  Kevon Phoenix is a pleasant 51-year-old male who presents today for initial evaluation of chronic bilateral knee pain  He has suffered with this for many years and has been treated conservatively in the past by Dr Elba Lema as well as in the [facility]  His most recent treatment was a round of viscosupplementation injection administered in July of 2019 through the [facility]  He does also report a history of right knee medial meniscectomy performed by Dr Elba Lema on 01/04/2016  He has attempted conservative treatment interventions in the past including analgesics, anti-inflammatories, bracing, and activity modification  He does receive some relief from icing as well as a compressive brace but states that his current braces are now worn out  He states that he was quite active in his past including participating in marathon running    At today's visit, he describes an aching pain about the medial aspect of both knees that is worse with activity and somewhat better at rest    He works at while [employer] and is on his feet most of the day there and does find that this exacerbates his symptoms  He states that the pain in his left knee average is 7/10 and the pain in his right knee is 4/10  He does also report that he has had intermittent efforts at weight loss recently  He denies any acute injury or trauma  He denies any distal paresthesias of the lower extremity  He denies any mechanical symptoms about either knee but does report intermittent non painful popping and cracking  Review of Systems   Constitutional: Positive for activity change  Negative for chills, fever and unexpected weight change  HENT: Negative for hearing loss, nosebleeds and sore throat  Eyes: Negative for pain, redness and visual disturbance  Respiratory: Negative for cough, shortness of breath and wheezing  Cardiovascular: Negative for chest pain, palpitations and leg swelling  Gastrointestinal: Negative for abdominal pain, nausea and vomiting  Endocrine: Negative for polyphagia and polyuria  Genitourinary: Negative for dysuria and hematuria  Musculoskeletal: Positive for arthralgias, joint swelling and myalgias  See HPI   Skin: Negative for rash and wound  Neurological: Negative for dizziness, numbness and headaches  Psychiatric/Behavioral: Negative for decreased concentration and suicidal ideas  The patient is not nervous/anxious  Past Medical History:   Diagnosis Date    Anxiety     GERD (gastroesophageal reflux disease)     Kidney stone     [facility]     Sleep apnea     cpap       Past Surgical History:   Procedure Laterality Date    KNEE ARTHROPLASTY         History reviewed  No pertinent family history      Social History     Occupational History    Not on file   Tobacco Use    Smoking status: Never Smoker    Smokeless tobacco: Never Used   Substance and Sexual Activity  Alcohol use: No    Drug use: No    Sexual activity: Not on file         Current Outpatient Medications:     Aspirin (ASPIR-81 PO), Take 81 mg by mouth daily Aspir-81 TBEC  Refills: 0  Active , Disp: , Rfl:     Cholecalciferol (VITAMIN D PO), Take 5,000 Units by mouth daily, Disp: , Rfl:     escitalopram (LEXAPRO) 10 mg tablet, Lexapro 10 MG Oral Tablet  Refills: 0  Active, Disp: , Rfl:     Omega-3 Fatty Acids (FISH OIL) 1200 MG CPDR, Take by mouth, Disp: , Rfl:     pantoprazole (PROTONIX) 40 mg, Take 40 mg by mouth daily Protonix 40 MG Oral Packet  Refills: 0  Active , Disp: , Rfl:     Allergies   Allergen Reactions    Azithromycin     Erythromycin     Erythromycin Base Hives       Objective:  Vitals:    11/15/19 0917   BP: 137/79   Pulse: 101       Body mass index is 49 21 kg/m²  Right Knee Exam     Tenderness   The patient is experiencing tenderness in the medial joint line  Range of Motion   Right knee extension: 0  Right knee flexion: 130  Tests   Varus: negative Valgus: negative  Drawer:  Anterior - negative    Posterior - negative  Patellar apprehension: negative    Other   Erythema: absent  Scars: absent  Sensation: normal  Pulse: present  Swelling: none  Effusion: no effusion present    Comments:  Stable at 0, 30 and 90°  Neurovascularly intact distally  No warmth, erythema or signs of infection  Parapatellar crepitance noted  Patellofemoral grind:  Negative  Diffuse discoloration of the distal lower leg  Healing abrasion about the anterior aspect of the knee just superior to the tibial tubercle        Left Knee Exam     Tenderness   The patient is experiencing tenderness in the medial joint line and lateral joint line  Range of Motion   Left knee extension: 0  Left knee flexion: 130       Tests   Varus: negative Valgus: negative  Drawer:  Anterior - negative     Posterior - negative  Patellar apprehension: negative    Other   Erythema: absent  Scars: absent  Sensation: normal  Pulse: present  Swelling: none  Effusion: no effusion present    Comments:  Stable at 0, 30 and 90°  Neurovascularly intact distally  No warmth, erythema or signs of infection  Parapatellar crepitance noted  Patellofemoral grind:  Negative  Diffuse discoloration of the distal lower leg            Observations   Left Knee   Negative for effusion  Right Knee   Negative for effusion  Physical Exam   Constitutional: He is oriented to person, place, and time  He appears well-developed and well-nourished  HENT:   Head: Normocephalic and atraumatic  Eyes: Pupils are equal, round, and reactive to light  Conjunctivae and EOM are normal    Neck: Normal range of motion  Neck supple  Cardiovascular: Normal rate and intact distal pulses  Pulmonary/Chest: Effort normal  No respiratory distress  Abdominal: Soft  Musculoskeletal:        Right knee: He exhibits no effusion  Left knee: He exhibits no effusion  As noted in HPI   Neurological: He is alert and oriented to person, place, and time  Skin: Skin is warm and dry  Psychiatric: He has a normal mood and affect  His behavior is normal    Nursing note and vitals reviewed  I have personally reviewed pertinent films in PACS  Bilateral knee x-rays obtained on 11/15/2019 show moderate right knee degenerative change and moderate to severe left knee degenerative change as evidenced by joint space narrowing most prominent in the medial compartment with less than 50% joint space remaining, sclerosis, and marginal osteophytosis more significant on the right  There is no acute fracture, dislocation, lytic or blastic lesion

## 2020-01-22 ENCOUNTER — OFFICE VISIT (OUTPATIENT)
Dept: OBGYN CLINIC | Facility: CLINIC | Age: 55
End: 2020-01-22
Payer: COMMERCIAL

## 2020-01-22 VITALS
WEIGHT: 286 LBS | DIASTOLIC BLOOD PRESSURE: 84 MMHG | HEART RATE: 97 BPM | HEIGHT: 63 IN | BODY MASS INDEX: 50.68 KG/M2 | SYSTOLIC BLOOD PRESSURE: 137 MMHG

## 2020-01-22 DIAGNOSIS — M25.561 CHRONIC PAIN OF BOTH KNEES: ICD-10-CM

## 2020-01-22 DIAGNOSIS — M25.562 CHRONIC PAIN OF BOTH KNEES: ICD-10-CM

## 2020-01-22 DIAGNOSIS — G89.29 CHRONIC PAIN OF BOTH KNEES: ICD-10-CM

## 2020-01-22 DIAGNOSIS — M17.0 PRIMARY OSTEOARTHRITIS OF BOTH KNEES: Primary | ICD-10-CM

## 2020-01-22 PROCEDURE — 99214 OFFICE O/P EST MOD 30 MIN: CPT | Performed by: ORTHOPAEDIC SURGERY

## 2020-01-22 NOTE — PROGRESS NOTES
Assessment/Plan:  1  Primary osteoarthritis of both knees  Injection procedure prior authorization   2  Chronic pain of both knees  Injection procedure prior authorization   3  BMI 50 0-59 9, adult ([address] )       Scribe Attestation    I,:   Amanda Lloyd am acting as a scribe while in the presence of the attending physician :        I,:   Pj Arora, DO personally performed the services described in this documentation    as scribed in my presence :          Pricila Mares is a very pleasant 77-year-old male who returns today for follow-up evaluation of his chronic bilateral knee pain due to severe underlying osteoarthritis  I am pleased that he has had lasting relief from his right knee pain following the bilateral corticosteroid injections administered on 11/15/2019  I do believe that the return of his activity related left knee pain is at least in part due to his participation in high impact exercises with his nephew  I do also have concern over the sharp increase in his BMI since we saw him two months ago  We did spend time discussing exercises appropriate for him and I also counseled him on nutrition  I did again offer him a referral to bariatric surgery which he declined today in the office  He is adamant that he would like to attempt weight loss without assistance  Together, we did set a weight loss goal of 28 lb and a time frame for this weight loss of three months  He did agree that if he is unable to achieve this goal he will act on the referral to bariatric surgery that is active in his chart  With respect to his continued pain, he does understand that it is too soon for repeat corticosteroid injection  We discussed graduation to viscosupplementation  He does report undergoing a Euflexxa injection series without significant relief of his symptoms  I did place an order for preauthorization for a one-shot viscosupplementation injection    We will reach out to him after authorization is obtained to schedule an appointment for administration of the injection  We will plan on seeing him back three months from now for a weight check to see if he has achieved the goals set today in the office  Subjective: Follow up evaluation for chronic bilateral knee pain  Patient ID: Blair Lacey is a 47 y o  male  HPI  Oscarmeli Salazar is a very pleasant 51-year-old male who presents with his wife today for follow-up evaluation of chronic bilateral knee pain  He was last seen in October of 2019 when he had bilateral corticosteroid injections administered  At today's visit, he states that his right knee still feels good but he reports a return an increase of his activity related left knee pain  He also states that he feels fatigued and reports gaining weight since we saw him last   He does state that he has been exercising with his nephew and has been performing high impact exercises  He is hopeful to find another avenue for pain relief for his left knee  He denies any new injury trauma  He denies any distal paresthesias of the lower extremities  Review of Systems   Constitutional: Negative for chills, fever and unexpected weight change  HENT: Negative for hearing loss, nosebleeds and sore throat  Eyes: Negative for pain, redness and visual disturbance  Respiratory: Positive for wheezing  Negative for cough and shortness of breath  Cardiovascular: Positive for leg swelling  Negative for chest pain and palpitations  Gastrointestinal: Negative for abdominal pain, nausea and vomiting  Endocrine: Negative for polyphagia and polyuria  Genitourinary: Negative for dysuria and hematuria  Musculoskeletal: Positive for arthralgias, joint swelling and myalgias  See HPI   Skin: Negative for rash and wound  Neurological: Positive for numbness  Negative for dizziness and headaches  Psychiatric/Behavioral: Negative for decreased concentration and suicidal ideas  The patient is not nervous/anxious  Past Medical History:   Diagnosis Date    Anxiety     GERD (gastroesophageal reflux disease)     Kidney stone     MI (myocardial infarction) [facility] [address] )     Sleep apnea     cpap       Past Surgical History:   Procedure Laterality Date    KNEE ARTHROPLASTY         History reviewed  No pertinent family history  Social History     Occupational History    Not on file   Tobacco Use    Smoking status: Never Smoker    Smokeless tobacco: Never Used   Substance and Sexual Activity    Alcohol use: No    Drug use: No    Sexual activity: Not on file         Current Outpatient Medications:     Aspirin (ASPIR-81 PO), Take 81 mg by mouth daily Aspir-81 TBEC  Refills: 0  Active , Disp: , Rfl:     Cholecalciferol (VITAMIN D PO), Take 5,000 Units by mouth daily, Disp: , Rfl:     escitalopram (LEXAPRO) 10 mg tablet, Lexapro 10 MG Oral Tablet  Refills: 0  Active, Disp: , Rfl:     Omega-3 Fatty Acids (FISH OIL) 1200 MG CPDR, Take by mouth, Disp: , Rfl:     pantoprazole (PROTONIX) 40 mg, Take 40 mg by mouth daily Protonix 40 MG Oral Packet  Refills: 0  Active , Disp: , Rfl:     Allergies   Allergen Reactions    Azithromycin     Erythromycin     Erythromycin Base Hives       Objective:  Vitals:    01/22/20 1515   BP: 137/84   Pulse: 97       Body mass index is 50 66 kg/m²  Left Knee Exam     Tenderness   The patient is experiencing tenderness in the medial joint line and lateral joint line  Range of Motion   Left knee extension: 0  Left knee flexion: 130       Tests   Ginger:  Medial - negative Lateral - negative  Varus: negative Valgus: negative  Drawer:  Anterior - negative     Posterior - negative    Other   Erythema: absent  Scars: absent  Sensation: normal  Pulse: present  Swelling: none  Effusion: no effusion present    Comments:  Diffuse discoloration of the distal lower leg  Stable at 0, 30 and 90°  Neurovascularly intact distally  No warmth, erythema or signs of infection  Patella tracks midline with crepitance  Patellofemoral grind:  Negative            Observations   Left Knee   Negative for effusion  Physical Exam   Constitutional: He is oriented to person, place, and time  He appears well-developed and well-nourished  HENT:   Head: Normocephalic and atraumatic  Eyes: Pupils are equal, round, and reactive to light  Conjunctivae and EOM are normal    Neck: Normal range of motion  Neck supple  Cardiovascular: Normal rate and intact distal pulses  Pulmonary/Chest: Effort normal  No respiratory distress  Abdominal: Soft  Musculoskeletal:        Left knee: He exhibits no effusion  As noted in HPI   Neurological: He is alert and oriented to person, place, and time  Skin: Skin is warm and dry  Psychiatric: He has a normal mood and affect  His behavior is normal    Nursing note and vitals reviewed

## 2020-01-24 ENCOUNTER — TELEPHONE (OUTPATIENT)
Dept: OBGYN CLINIC | Facility: HOSPITAL | Age: 55
End: 2020-01-24

## 2020-01-24 NOTE — TELEPHONE ENCOUNTER
Dr Sophia An patient    Patient is unsure if he should be using ice or heat on his L knee     Patient states ice does not seem to be helping     Phone #: [phone]

## 2020-01-24 NOTE — TELEPHONE ENCOUNTER
I spoke with patient and he states the ice makes his knee throb  I advised to try the heat 20 min on 20 min off as often as he can at least 4 x daily  Okay to Alternate ibuprofen 600-800mg TID with food and Tylenol 1000mg TID for pain and inflammation  Patient waiting for visco to be approved

## 2020-02-18 ENCOUNTER — TELEPHONE (OUTPATIENT)
Dept: OBGYN CLINIC | Facility: CLINIC | Age: 55
End: 2020-02-18

## 2020-02-20 NOTE — TELEPHONE ENCOUNTER
I see no auth req and a request was sent to [facility]  Unsure if Ty ever responded or if patient was advised to set up an account with them? Could we please inform patient, thank you

## 2020-02-28 NOTE — TELEPHONE ENCOUNTER
Request was submitted to [facility] they are still verifying benefits  They will reach out to patient from a 1-800 number for consent to ship injections patient can also call [facility] to check status at [phone]

## 2020-03-18 NOTE — TELEPHONE ENCOUNTER
[facility] needed consent to from patient and have reached out to them with no success  It is ready to ship just need consent they need to call [phone]  Left message on voicemail with detailed information  If patient gives consent we may have these injections delivery tomorrow 3/19/20

## 2020-03-27 NOTE — TELEPHONE ENCOUNTER
Patient still has not gave consent  [facility] has tried calling and they even sent a letter out to his home on 3/20/20 to call them back at [phone] for consent before they close out the referral and we would have to start all over with the process once they do that

## 2020-04-29 ENCOUNTER — TELEMEDICINE (OUTPATIENT)
Dept: BARIATRICS | Facility: CLINIC | Age: 55
End: 2020-04-29
Payer: COMMERCIAL

## 2020-04-29 VITALS — HEIGHT: 63 IN | WEIGHT: 287 LBS | BODY MASS INDEX: 50.85 KG/M2

## 2020-04-29 DIAGNOSIS — E66.01 MORBID OBESITY WITH BMI OF 50.0-59.9, ADULT (HCC): Primary | ICD-10-CM

## 2020-04-29 DIAGNOSIS — K21.9 GASTROESOPHAGEAL REFLUX DISEASE, ESOPHAGITIS PRESENCE NOT SPECIFIED: ICD-10-CM

## 2020-04-29 DIAGNOSIS — E55.9 VITAMIN D DEFICIENCY: ICD-10-CM

## 2020-04-29 DIAGNOSIS — M17.11 OSTEOARTHRITIS OF RIGHT KNEE, UNSPECIFIED OSTEOARTHRITIS TYPE: ICD-10-CM

## 2020-04-29 DIAGNOSIS — E78.5 HYPERLIPIDEMIA, UNSPECIFIED HYPERLIPIDEMIA TYPE: ICD-10-CM

## 2020-04-29 PROBLEM — F41.9 ANXIETY: Status: ACTIVE | Noted: 2020-04-29

## 2020-04-29 PROBLEM — G47.30 SLEEP APNEA: Status: ACTIVE | Noted: 2020-04-29

## 2020-04-29 PROCEDURE — 99204 OFFICE O/P NEW MOD 45 MIN: CPT | Performed by: PHYSICIAN ASSISTANT

## 2020-04-29 RX ORDER — ATORVASTATIN CALCIUM 20 MG/1
20 TABLET, FILM COATED ORAL DAILY
COMMUNITY
Start: 2020-03-23 | End: 2021-09-29

## 2020-04-29 RX ORDER — ERGOCALCIFEROL 1.25 MG/1
50000 CAPSULE ORAL WEEKLY
COMMUNITY
Start: 2020-02-12 | End: 2020-08-04 | Stop reason: ALTCHOICE

## 2020-04-30 ENCOUNTER — TELEPHONE (OUTPATIENT)
Dept: BARIATRICS | Facility: CLINIC | Age: 55
End: 2020-04-30

## 2020-04-30 DIAGNOSIS — R73.01 ELEVATED FASTING GLUCOSE: Primary | ICD-10-CM

## 2020-05-26 ENCOUNTER — TELEPHONE (OUTPATIENT)
Dept: BARIATRICS | Facility: CLINIC | Age: 55
End: 2020-05-26

## 2020-06-02 ENCOUNTER — TELEPHONE (OUTPATIENT)
Dept: BARIATRICS | Facility: CLINIC | Age: 55
End: 2020-06-02

## 2020-06-03 ENCOUNTER — TELEPHONE (OUTPATIENT)
Dept: BARIATRICS | Facility: CLINIC | Age: 55
End: 2020-06-03

## 2020-08-04 ENCOUNTER — OFFICE VISIT (OUTPATIENT)
Dept: BARIATRICS | Facility: CLINIC | Age: 55
End: 2020-08-04
Payer: COMMERCIAL

## 2020-08-04 VITALS
DIASTOLIC BLOOD PRESSURE: 82 MMHG | TEMPERATURE: 98.4 F | RESPIRATION RATE: 16 BRPM | BODY MASS INDEX: 51.1 KG/M2 | SYSTOLIC BLOOD PRESSURE: 130 MMHG | WEIGHT: 288.4 LBS | HEIGHT: 63 IN | HEART RATE: 85 BPM

## 2020-08-04 DIAGNOSIS — R73.01 ELEVATED FASTING GLUCOSE: ICD-10-CM

## 2020-08-04 DIAGNOSIS — E66.01 MORBID OBESITY WITH BMI OF 50.0-59.9, ADULT (HCC): Primary | ICD-10-CM

## 2020-08-04 DIAGNOSIS — E55.9 VITAMIN D DEFICIENCY: ICD-10-CM

## 2020-08-04 DIAGNOSIS — G47.30 SLEEP APNEA: ICD-10-CM

## 2020-08-04 PROCEDURE — 99215 OFFICE O/P EST HI 40 MIN: CPT | Performed by: PHYSICIAN ASSISTANT

## 2020-08-04 RX ORDER — PANTOPRAZOLE SODIUM 40 MG/1
40 TABLET, DELAYED RELEASE ORAL DAILY
COMMUNITY
Start: 2020-05-05 | End: 2021-03-22

## 2020-08-04 NOTE — ASSESSMENT & PLAN NOTE
-Discussed options of HealthyCORE-Intensive Lifestyle Intervention Program, Very Low Calorie Diet-VLCD and Conservative Program and the role of weight loss medications  Patient did not want to hear about bariatric surgery options  -Initial weight loss goal of 5-10% weight loss for improved health  -Screening labs: will be getting done tomorrow  -Patient is interested in pursuing HealthyCORE-Intensive Lifestyle Intervention Program     Initial: 287 lbs  Current: 288  4  Change: +1 4 lbs  Goal: 200 lbs

## 2020-08-04 NOTE — PROGRESS NOTES
Assessment/Plan: Morbid obesity with BMI of 50 0-59 9, adult [facility])  -Discussed options of HealthyCORE-Intensive Lifestyle Intervention Program, Very Low Calorie Diet-VLCD and Conservative Program and the role of weight loss medications  Patient did not want to hear about bariatric surgery options  -Initial weight loss goal of 5-10% weight loss for improved health  -Screening labs: will be getting done tomorrow  -Patient is interested in pursuing HealthyCORE-Intensive Lifestyle Intervention Program     Initial: 287 lbs  Current: 288  4  Change: +1 4 lbs  Goal: 200 lbs    Sleep apnea  -reports he is compliant with CPAP  -may improve with weight loss    Vitamin D deficiency  -recently finished D2, now on D3 OTC  -reports he will be having follow up lab work    Elevated fasting glucose  -HgbA1c and fasting insulin ordered    Goals:    Food log (ie ) www myfitnesspal com,sparkpeople  com,loseit com,calorieking  com,etc    No sugary beverages  At least 64oz of water daily  Increase physical activity by 10 minutes daily  Gradually increase physical activity to a goal of 5 days per week for 30 minutes of MODERATE intensity PLUS 2 days per week of FULL BODY resistance training    Follow up in approximately 1 week with Non-Surgical Dietician  40 minute visit, >50% face-to-face time spent counseling patient on diet behavior and exercise modification for weight loss  Diagnoses and all orders for this visit:    Morbid obesity with BMI of 50 0-59 9, adult ([address] )    Sleep apnea    Vitamin D deficiency    Elevated fasting glucose    Other orders  -     pantoprazole (PROTONIX) 40 mg tablet; Take 40 mg by mouth daily          Subjective:   Chief Complaint   Patient presents with    Follow-up     pt is here for mwm follow up  Patient ID: Loco Shah  is a 54 y o  male with excess weight/obesity here to pursue weight managment  Patient is pursuing Conservative Program      HPI Patient presents for MWM follow up  Initial consult at the end of April but has not followed up since  Reports he did not get packet of information that was mailed to him  Reviewed programs with him again  Did not want to hear about bariatric options  Recently started swimming for an hour a couple of times per week  He is limited with exercise due to his knee pain  Reports he is concerned about his health and ready to make a change    -Reports he completed vitamin D and is now on over the counter supplementation    The following portions of the patient's history were reviewed and updated as appropriate: allergies, current medications, past family history, past medical history, past social history, past surgical history and problem list     Review of Systems   Constitutional: Positive for fatigue  Respiratory: Negative  Cardiovascular: Negative  Gastrointestinal: Negative  Psychiatric/Behavioral:        Reports feeling down in regard to his weight       Objective:    /82 (BP Location: Left arm, Patient Position: Sitting, Cuff Size: Large)   Pulse 85   Temp 98 4 °F (36 9 °C) (Tympanic)   Resp 16   Ht 5' 3"   Wt 131 kg (288 lb 6 4 oz)   BMI 51 09 kg/m²      Physical Exam   Nursing note and vitals reviewed  Constitutional   General appearance: Abnormal   well developed and morbidly obese  Acanthosis nigricans noted on neck  Eyes No conjunctival pallor  Ears, Nose, Mouth, and Throat Oral mucosa moist    Pulmonary   Respiratory effort: No increased work of breathing or signs of respiratory distress  Auscultation of lungs: Clear to auscultation, equal breath sounds bilaterally, no wheezes, no rales, no rhonci  Cardiovascular   Auscultation of heart: Normal rate and rhythm, normal S1 and S2, without murmurs  Examination of extremities for edema and/or varicosities: Normal   no edema  Abdomen   Abdomen: Abnormal   The abdomen was obese  Bowel sounds were normal  The abdomen was soft and nontender     Musculoskeletal Gait and station: Normal     Psychiatric   Orientation to person, place and time: Normal     Affect: appropriate

## 2020-08-10 LAB — HBA1C MFR BLD HPLC: 6.6 %

## 2020-08-11 LAB — INSULIN SERPL-ACNC: 39.9 UIU/ML

## 2021-02-19 ENCOUNTER — TELEPHONE (OUTPATIENT)
Dept: FAMILY MEDICINE CLINIC | Facility: CLINIC | Age: 56
End: 2021-02-19

## 2021-02-19 ENCOUNTER — OFFICE VISIT (OUTPATIENT)
Dept: FAMILY MEDICINE CLINIC | Facility: CLINIC | Age: 56
End: 2021-02-19
Payer: COMMERCIAL

## 2021-02-19 VITALS
HEART RATE: 82 BPM | TEMPERATURE: 97 F | DIASTOLIC BLOOD PRESSURE: 64 MMHG | WEIGHT: 285.4 LBS | BODY MASS INDEX: 50.57 KG/M2 | SYSTOLIC BLOOD PRESSURE: 132 MMHG | RESPIRATION RATE: 16 BRPM | OXYGEN SATURATION: 18 % | HEIGHT: 63 IN

## 2021-02-19 DIAGNOSIS — K21.9 GASTROESOPHAGEAL REFLUX DISEASE, UNSPECIFIED WHETHER ESOPHAGITIS PRESENT: ICD-10-CM

## 2021-02-19 DIAGNOSIS — K11.1 PAROTID GLAND ENLARGEMENT: ICD-10-CM

## 2021-02-19 DIAGNOSIS — E53.8 CYANOCOBALAMIN DEFICIENCY: ICD-10-CM

## 2021-02-19 DIAGNOSIS — M25.542 JOINT PAIN IN BOTH HANDS: ICD-10-CM

## 2021-02-19 DIAGNOSIS — R63.8 INCREASED BMI: ICD-10-CM

## 2021-02-19 DIAGNOSIS — Z28.39 IMMUNIZATION DEFICIENCY: ICD-10-CM

## 2021-02-19 DIAGNOSIS — R22.2 MASS OF ANTERIOR ABDOMINAL WALL: ICD-10-CM

## 2021-02-19 DIAGNOSIS — G47.30 SLEEP APNEA, UNSPECIFIED TYPE: Primary | ICD-10-CM

## 2021-02-19 DIAGNOSIS — G47.30 SLEEP APNEA, UNSPECIFIED TYPE: ICD-10-CM

## 2021-02-19 DIAGNOSIS — Z11.4 SCREENING FOR HIV (HUMAN IMMUNODEFICIENCY VIRUS): ICD-10-CM

## 2021-02-19 DIAGNOSIS — N20.0 KIDNEY STONES: ICD-10-CM

## 2021-02-19 DIAGNOSIS — D86.9 SARCOIDOSIS: ICD-10-CM

## 2021-02-19 DIAGNOSIS — E66.01 MORBID OBESITY WITH BMI OF 50.0-59.9, ADULT (HCC): ICD-10-CM

## 2021-02-19 DIAGNOSIS — M17.11 OSTEOARTHRITIS OF RIGHT KNEE, UNSPECIFIED OSTEOARTHRITIS TYPE: ICD-10-CM

## 2021-02-19 DIAGNOSIS — R73.01 ELEVATED FASTING GLUCOSE: ICD-10-CM

## 2021-02-19 DIAGNOSIS — F41.9 ANXIETY: ICD-10-CM

## 2021-02-19 DIAGNOSIS — Z12.5 SCREENING PSA (PROSTATE SPECIFIC ANTIGEN): ICD-10-CM

## 2021-02-19 DIAGNOSIS — E78.5 HYPERLIPIDEMIA, UNSPECIFIED HYPERLIPIDEMIA TYPE: ICD-10-CM

## 2021-02-19 DIAGNOSIS — Z11.59 NEED FOR HEPATITIS C SCREENING TEST: Primary | ICD-10-CM

## 2021-02-19 DIAGNOSIS — M25.541 JOINT PAIN IN BOTH HANDS: ICD-10-CM

## 2021-02-19 DIAGNOSIS — E55.9 VITAMIN D DEFICIENCY: ICD-10-CM

## 2021-02-19 DIAGNOSIS — M79.89 SOFT TISSUE MASS: ICD-10-CM

## 2021-02-19 PROCEDURE — 3725F SCREEN DEPRESSION PERFORMED: CPT | Performed by: FAMILY MEDICINE

## 2021-02-19 PROCEDURE — 90732 PPSV23 VACC 2 YRS+ SUBQ/IM: CPT | Performed by: FAMILY MEDICINE

## 2021-02-19 PROCEDURE — 90471 IMMUNIZATION ADMIN: CPT | Performed by: FAMILY MEDICINE

## 2021-02-19 PROCEDURE — 99205 OFFICE O/P NEW HI 60 MIN: CPT | Performed by: FAMILY MEDICINE

## 2021-02-19 NOTE — TELEPHONE ENCOUNTER
Pt tried to schedule with pulmonology, and they require a chest xray  He'd like to get it done tomorrow along with his BW  Can this please be written

## 2021-02-20 ENCOUNTER — HOSPITAL ENCOUNTER (OUTPATIENT)
Dept: ULTRASOUND IMAGING | Facility: HOSPITAL | Age: 56
Discharge: HOME | End: 2021-02-20
Attending: FAMILY MEDICINE
Payer: COMMERCIAL

## 2021-02-20 ENCOUNTER — APPOINTMENT (OUTPATIENT)
Dept: LAB | Facility: CLINIC | Age: 56
End: 2021-02-20
Payer: COMMERCIAL

## 2021-02-20 ENCOUNTER — HOSPITAL ENCOUNTER (OUTPATIENT)
Dept: RADIOLOGY | Facility: HOSPITAL | Age: 56
Discharge: HOME | End: 2021-02-20
Attending: FAMILY MEDICINE
Payer: COMMERCIAL

## 2021-02-20 DIAGNOSIS — Z28.39 IMMUNIZATION DEFICIENCY: ICD-10-CM

## 2021-02-20 DIAGNOSIS — E78.5 HYPERLIPIDEMIA, UNSPECIFIED HYPERLIPIDEMIA TYPE: ICD-10-CM

## 2021-02-20 DIAGNOSIS — E66.01 MORBID OBESITY WITH BMI OF 50.0-59.9, ADULT (HCC): ICD-10-CM

## 2021-02-20 DIAGNOSIS — R22.2 MASS OF ANTERIOR ABDOMINAL WALL: ICD-10-CM

## 2021-02-20 DIAGNOSIS — M79.89 SOFT TISSUE MASS: ICD-10-CM

## 2021-02-20 DIAGNOSIS — D86.9 SARCOIDOSIS: ICD-10-CM

## 2021-02-20 DIAGNOSIS — R73.01 ELEVATED FASTING GLUCOSE: ICD-10-CM

## 2021-02-20 DIAGNOSIS — E55.9 VITAMIN D DEFICIENCY: ICD-10-CM

## 2021-02-20 DIAGNOSIS — K11.1 PAROTID GLAND ENLARGEMENT: ICD-10-CM

## 2021-02-20 DIAGNOSIS — Z11.59 NEED FOR HEPATITIS C SCREENING TEST: ICD-10-CM

## 2021-02-20 DIAGNOSIS — M25.541 JOINT PAIN IN BOTH HANDS: ICD-10-CM

## 2021-02-20 DIAGNOSIS — G47.30 SLEEP APNEA, UNSPECIFIED TYPE: ICD-10-CM

## 2021-02-20 DIAGNOSIS — Z12.5 SCREENING PSA (PROSTATE SPECIFIC ANTIGEN): ICD-10-CM

## 2021-02-20 DIAGNOSIS — Z11.4 SCREENING FOR HIV (HUMAN IMMUNODEFICIENCY VIRUS): ICD-10-CM

## 2021-02-20 DIAGNOSIS — N20.0 KIDNEY STONES: ICD-10-CM

## 2021-02-20 DIAGNOSIS — M25.542 JOINT PAIN IN BOTH HANDS: ICD-10-CM

## 2021-02-20 DIAGNOSIS — E53.8 CYANOCOBALAMIN DEFICIENCY: ICD-10-CM

## 2021-02-20 LAB
25(OH)D3 SERPL-MCNC: 26.7 NG/ML (ref 30–100)
ALBUMIN SERPL BCP-MCNC: 4 G/DL (ref 3.5–5)
ALP SERPL-CCNC: 92 U/L (ref 46–116)
ALT SERPL W P-5'-P-CCNC: 36 U/L (ref 12–78)
ANION GAP SERPL CALCULATED.3IONS-SCNC: 10 MMOL/L (ref 4–13)
AST SERPL W P-5'-P-CCNC: 20 U/L (ref 5–45)
BASOPHILS # BLD AUTO: 0.05 THOUSANDS/ΜL (ref 0–0.1)
BASOPHILS NFR BLD AUTO: 1 % (ref 0–1)
BILIRUB SERPL-MCNC: 0.47 MG/DL (ref 0.2–1)
BILIRUB UR QL STRIP: NEGATIVE
BUN SERPL-MCNC: 16 MG/DL (ref 5–25)
CALCIUM SERPL-MCNC: 9.4 MG/DL (ref 8.3–10.1)
CHLORIDE SERPL-SCNC: 105 MMOL/L (ref 100–108)
CHOLEST SERPL-MCNC: 202 MG/DL (ref 50–200)
CLARITY UR: CLEAR
CO2 SERPL-SCNC: 26 MMOL/L (ref 21–32)
COLOR UR: YELLOW
CORTIS AM PEAK SERPL-MCNC: 10.3 UG/DL (ref 4.2–22.4)
CREAT SERPL-MCNC: 1.19 MG/DL (ref 0.6–1.3)
CREAT UR-MCNC: 108 MG/DL
CRP SERPL QL: 12.9 MG/L
EOSINOPHIL # BLD AUTO: 0.23 THOUSAND/ΜL (ref 0–0.61)
EOSINOPHIL NFR BLD AUTO: 4 % (ref 0–6)
ERYTHROCYTE [DISTWIDTH] IN BLOOD BY AUTOMATED COUNT: 13.2 % (ref 11.6–15.1)
ERYTHROCYTE [SEDIMENTATION RATE] IN BLOOD: 32 MM/HOUR (ref 0–19)
EST. AVERAGE GLUCOSE BLD GHB EST-MCNC: 131 MG/DL
GFR SERPL CREATININE-BSD FRML MDRD: 68 ML/MIN/1.73SQ M
GLUCOSE P FAST SERPL-MCNC: 133 MG/DL (ref 65–99)
GLUCOSE UR STRIP-MCNC: NEGATIVE MG/DL
HAV AB SER QL IA: NORMAL
HBA1C MFR BLD: 6.2 %
HBV SURFACE AB SER-ACNC: <3.1 MIU/ML
HCT VFR BLD AUTO: 46.9 % (ref 36.5–49.3)
HCV AB SER QL: NORMAL
HDLC SERPL-MCNC: 43 MG/DL
HGB BLD-MCNC: 15.3 G/DL (ref 12–17)
HGB UR QL STRIP.AUTO: NEGATIVE
IMM GRANULOCYTES # BLD AUTO: 0.04 THOUSAND/UL (ref 0–0.2)
IMM GRANULOCYTES NFR BLD AUTO: 1 % (ref 0–2)
KETONES UR STRIP-MCNC: NEGATIVE MG/DL
LDLC SERPL CALC-MCNC: 127 MG/DL (ref 0–100)
LEUKOCYTE ESTERASE UR QL STRIP: NEGATIVE
LYMPHOCYTES # BLD AUTO: 0.99 THOUSANDS/ΜL (ref 0.6–4.47)
LYMPHOCYTES NFR BLD AUTO: 18 % (ref 14–44)
MCH RBC QN AUTO: 28.5 PG (ref 26.8–34.3)
MCHC RBC AUTO-ENTMCNC: 32.6 G/DL (ref 31.4–37.4)
MCV RBC AUTO: 88 FL (ref 82–98)
MICROALBUMIN UR-MCNC: 10.4 MG/L (ref 0–20)
MICROALBUMIN/CREAT 24H UR: 10 MG/G CREATININE (ref 0–30)
MONOCYTES # BLD AUTO: 0.41 THOUSAND/ΜL (ref 0.17–1.22)
MONOCYTES NFR BLD AUTO: 8 % (ref 4–12)
NEUTROPHILS # BLD AUTO: 3.76 THOUSANDS/ΜL (ref 1.85–7.62)
NEUTS SEG NFR BLD AUTO: 68 % (ref 43–75)
NITRITE UR QL STRIP: NEGATIVE
NRBC BLD AUTO-RTO: 0 /100 WBCS
PH UR STRIP.AUTO: 6 [PH]
PLATELET # BLD AUTO: 224 THOUSANDS/UL (ref 149–390)
PMV BLD AUTO: 9.7 FL (ref 8.9–12.7)
POTASSIUM SERPL-SCNC: 4.2 MMOL/L (ref 3.5–5.3)
PROT SERPL-MCNC: 7.9 G/DL (ref 6.4–8.2)
PROT UR STRIP-MCNC: NEGATIVE MG/DL
PSA SERPL-MCNC: 1.8 NG/ML (ref 0–4)
PTH-INTACT SERPL-MCNC: 31.3 PG/ML (ref 18.4–80.1)
RBC # BLD AUTO: 5.36 MILLION/UL (ref 3.88–5.62)
RUBV IGG SERPL IA-ACNC: 1.9 IU/ML
SODIUM SERPL-SCNC: 141 MMOL/L (ref 136–145)
SP GR UR STRIP.AUTO: 1.02 (ref 1–1.03)
TRIGL SERPL-MCNC: 160 MG/DL
TSH SERPL DL<=0.05 MIU/L-ACNC: 1.43 UIU/ML (ref 0.36–3.74)
URATE SERPL-MCNC: 6.9 MG/DL (ref 4.2–8)
UROBILINOGEN UR QL STRIP.AUTO: 0.2 E.U./DL
VIT B12 SERPL-MCNC: 453 PG/ML (ref 100–900)
WBC # BLD AUTO: 5.48 THOUSAND/UL (ref 4.31–10.16)

## 2021-02-20 PROCEDURE — 86706 HEP B SURFACE ANTIBODY: CPT

## 2021-02-20 PROCEDURE — 86038 ANTINUCLEAR ANTIBODIES: CPT

## 2021-02-20 PROCEDURE — 83970 ASSAY OF PARATHORMONE: CPT

## 2021-02-20 PROCEDURE — 76882 US LMTD JT/FCL EVL NVASC XTR: CPT

## 2021-02-20 PROCEDURE — 71046 X-RAY EXAM CHEST 2 VIEWS: CPT

## 2021-02-20 PROCEDURE — 81003 URINALYSIS AUTO W/O SCOPE: CPT | Performed by: FAMILY MEDICINE

## 2021-02-20 PROCEDURE — 83036 HEMOGLOBIN GLYCOSYLATED A1C: CPT

## 2021-02-20 PROCEDURE — 85025 COMPLETE CBC W/AUTO DIFF WBC: CPT

## 2021-02-20 PROCEDURE — 86765 RUBEOLA ANTIBODY: CPT

## 2021-02-20 PROCEDURE — 80053 COMPREHEN METABOLIC PANEL: CPT

## 2021-02-20 PROCEDURE — G0103 PSA SCREENING: HCPCS

## 2021-02-20 PROCEDURE — 82164 ANGIOTENSIN I ENZYME TEST: CPT

## 2021-02-20 PROCEDURE — 86430 RHEUMATOID FACTOR TEST QUAL: CPT

## 2021-02-20 PROCEDURE — 87389 HIV-1 AG W/HIV-1&-2 AB AG IA: CPT

## 2021-02-20 PROCEDURE — 86762 RUBELLA ANTIBODY: CPT

## 2021-02-20 PROCEDURE — 86140 C-REACTIVE PROTEIN: CPT

## 2021-02-20 PROCEDURE — 84550 ASSAY OF BLOOD/URIC ACID: CPT

## 2021-02-20 PROCEDURE — 84443 ASSAY THYROID STIM HORMONE: CPT

## 2021-02-20 PROCEDURE — 86735 MUMPS ANTIBODY: CPT

## 2021-02-20 PROCEDURE — 86618 LYME DISEASE ANTIBODY: CPT

## 2021-02-20 PROCEDURE — 86803 HEPATITIS C AB TEST: CPT

## 2021-02-20 PROCEDURE — 82607 VITAMIN B-12: CPT

## 2021-02-20 PROCEDURE — 80061 LIPID PANEL: CPT

## 2021-02-20 PROCEDURE — 36415 COLL VENOUS BLD VENIPUNCTURE: CPT

## 2021-02-20 PROCEDURE — 86708 HEPATITIS A ANTIBODY: CPT

## 2021-02-20 PROCEDURE — 76536 US EXAM OF HEAD AND NECK: CPT

## 2021-02-20 PROCEDURE — 85652 RBC SED RATE AUTOMATED: CPT

## 2021-02-20 PROCEDURE — 82570 ASSAY OF URINE CREATININE: CPT

## 2021-02-20 PROCEDURE — 82533 TOTAL CORTISOL: CPT

## 2021-02-20 PROCEDURE — 82043 UR ALBUMIN QUANTITATIVE: CPT

## 2021-02-20 PROCEDURE — 82306 VITAMIN D 25 HYDROXY: CPT

## 2021-02-20 NOTE — PROGRESS NOTES
BMI Counseling: Body mass index is 50 56 kg/m²  The BMI is above normal  Nutrition recommendations include decreasing portion sizes, encouraging healthy choices of fruits and vegetables, limiting drinks that contain sugar and reducing intake of cholesterol  Exercise recommendations include exercising 3-5 times per week  No pharmacotherapy was ordered  Assessment/Plan:    Refer patient to pulmonologist for evaluation of sleep apnea and sarcoidosis  Also will need preop clearance since he will need to get large masses on his back removed for biopsy concern for large mass could be lipoma but can also be liposarcoma  We ultrasound the mass and ultrasound of the parotid gland since there is also swelling seen on exam also ultrasound the mass on the left lower quadrant and the abdomen side  Need blood work  Will refer patient to pain specialist for his hand pain  Will need to workup to rule out Cushing disease since he has large mass on his back could be cushing syndrome  Refer pt to general sx for removal of large masses on back  Close monitor needed  Advised for Pneumovax vaccine since he has sarcoidosis  Recommend for shingle vaccine which he wants to hold off  Recommend COVID vaccine  I have spent 60 minutes with Patient  today in which greater than 50% of this time was spent in counseling/coordination of care regarding Risks and benefits of tx options           Problem List Items Addressed This Visit        Digestive    GERD (gastroesophageal reflux disease)    Parotid gland enlargement    Relevant Orders    US head neck soft tissue    CBC and differential       Respiratory    Sleep apnea    Relevant Orders    Ambulatory referral to Pulmonology       Musculoskeletal and Integument    Osteoarthritis of right knee       Genitourinary    Kidney stones    Relevant Orders    Microalbumin,Urine    UA w Reflex to Microscopic w Reflex to Culture    Uric acid    PTH, intact       Other    Morbid obesity with BMI of 50 0-59 9, adult ([facility] [address] )    Relevant Orders    TSH, 3rd generation with Free T4 reflex    Angiotensin converting enzyme    Hyperlipidemia    Relevant Orders    Comprehensive metabolic panel    Lipid Panel with Direct LDL reflex    Anxiety    Vitamin D deficiency    Relevant Orders    Vitamin D 25 hydroxy    Elevated fasting glucose    Relevant Orders    Hemoglobin A1C    Sarcoidosis    Relevant Orders    C-reactive protein    Ambulatory referral to Pulmonology    PNEUMOCOCCAL POLYSACCHARIDE VACCINE 23-VALENT =>1YO SQ IM (Completed)    Mass of anterior abdominal wall    Relevant Orders    US extremity soft tissue    CBC and differential    Cortisol Level, AM Specimen    Soft tissue mass    Relevant Orders    US extremity soft tissue    Ambulatory referral to General Surgery    Joint pain in both hands    Relevant Orders    ONEIL Screen w/ Reflex to Titer/Pattern    Lyme Total Antibody Profile with reflex to WB    RF Screen w/ Reflex to Titer    Sedimentation rate, automated    Ambulatory referral to Orthopedic Surgery      Other Visit Diagnoses     Need for hepatitis C screening test    -  Primary    Relevant Orders    Hepatitis C Antibody ([facility])    Screening for HIV (human immunodeficiency virus)        Relevant Orders    HIV 1/2 Antigen/Antibody (4th Generation) w Reflex [facility]    Immunization deficiency        Relevant Orders    Hepatitis A antibody, total    Hepatitis B surface antibody    Measles/Mumps/Rubella Immunity    Screening PSA (prostate specific antigen)        Relevant Orders    PSA, Total Screen    Cyanocobalamin deficiency        Relevant Orders    Vitamin B12    Increased BMI                Subjective:      Patient ID: Lori Neri is a 54 y o  male  59-year-old male for hospitals care  He was diagnosed with sarcoidosis 20 years ago by lymph node biopsy  He saw pulmonologist 15 years ago was put on short term prednisone    Since then he has some breathing issues but he was never follow-up with pulmonologist after that  He  was diagnosed with myocardial infarction at 39years old  He had atypical chest pain and troponin was very elevated  He did not need stent placed  Since then he has been follow-up with cardiologist   Has obstructive sleep apnea on CPAP machine  Recently had carpal tunnel surgery done at [facility] but he stop breathing and surgery was stop short it was thought due to his sleep apnea  Since then he suffer from bilateral hand pain severe he would like to see hand specialist for that  He has anxiety on Lexapro  He has GERD and had peptic stricture he has follow-up with gastroenterologist at [facility]  Was seen with bariatric team for possible weight loss  option but he wants to do that on his own  He is a  for the [place] he enjoys his food and he does not want to seek surgical option just yet for obesity  He complained of large mass on his back that is very painful has been growing more recently  He also have a lump on his abdomen was told nothing to be found since he had a CT scan done and does show to be a stable left inguinal hernia  He does have kidney stones several times  He had colonoscopy done per recently  He does follow with local GI doctor Selene Mosley  The following portions of the patient's history were reviewed and updated as appropriate:   Past Medical History:  He has a past medical history of Anxiety, GERD (gastroesophageal reflux disease), Joint pain in both hands (2/19/2021), Kidney stone, Kidney stones (2/19/2021), Mass of anterior abdominal wall (2/19/2021), MI (myocardial infarction) ([facility] [address] ), Morbid obesity with BMI of 50 0-59 9, adult ([facility] [address] ), Parotid gland enlargement (2/19/2021), Sarcoidosis (2/19/2021), Sleep apnea, and Soft tissue mass (2/19/2021)  ,  _______________________________________________________________________  Medical Problems:  does not have any pertinent problems on file ,  _______________________________________________________________________  Past Surgical History:   has a past surgical history that includes Knee Arthroplasty and Hand surgery (Bilateral, 2019)  ,  _______________________________________________________________________  Family History:  family history includes Cancer in his father; Diabetes in his maternal grandfather and mother; Heart disease in his father, mother, paternal aunt, and paternal uncle ,  _______________________________________________________________________  Social History:   reports that he has never smoked  He has never used smokeless tobacco  He reports that he does not drink alcohol or use drugs  ,  _______________________________________________________________________  Allergies:  is allergic to azithromycin; erythromycin; and erythromycin base     _______________________________________________________________________  Current Outpatient Medications   Medication Sig Dispense Refill    Aspirin (ASPIR-81 PO) Take 81 mg by mouth daily Aspir-81 TBEC  Refills: 0  Active       atorvastatin (LIPITOR) 20 mg tablet Take 20 mg by mouth daily      Cholecalciferol (VITAMIN D PO) Take 5,000 Units by mouth daily      escitalopram (LEXAPRO) 10 mg tablet Lexapro 10 MG Oral Tablet  Refills: 0  Active      Omega-3 Fatty Acids (FISH OIL) 1200 MG CPDR Take 1 capsule by mouth daily       pantoprazole (PROTONIX) 40 mg tablet Take 40 mg by mouth daily      pantoprazole (PROTONIX) 40 mg Take 40 mg by mouth daily Protonix 40 MG Oral Packet  Refills: 0  Active       Albuterol Sulfate (VENTOLIN HFA IN) Ventolin NEBU    Refills: 0       Active       No current facility-administered medications for this visit       _______________________________________________________________________  Review of Systems   Constitutional: Positive for unexpected weight change  Negative for activity change, appetite change, fatigue and fever     HENT: Negative for dental problem and trouble swallowing  Eyes: Negative for photophobia and visual disturbance  Respiratory: Negative for cough and chest tightness  Cardiovascular: Negative for chest pain, palpitations and leg swelling  Gastrointestinal: Negative for abdominal pain, constipation and vomiting  Endocrine: Negative for cold intolerance, polydipsia and polyuria  Genitourinary: Negative for difficulty urinating, frequency and urgency  Musculoskeletal: Positive for back pain  Negative for arthralgias, joint swelling, myalgias and neck pain  Skin: Negative for color change, rash and wound  Allergic/Immunologic: Negative for environmental allergies  Neurological: Negative for dizziness, weakness and numbness  Hematological: Does not bruise/bleed easily  Psychiatric/Behavioral: Negative for decreased concentration, dysphoric mood, self-injury, sleep disturbance and suicidal ideas  Objective:  Vitals:    02/19/21 1048   BP: 132/64   BP Location: Left arm   Patient Position: Sitting   Cuff Size: Large   Pulse: 82   Resp: 16   Temp: (!) 97 °F (36 1 °C)   TempSrc: Temporal   SpO2: (!) 18%   Weight: 129 kg (285 lb 6 4 oz)   Height: 5' 3" (1 6 m)     Body mass index is 50 56 kg/m²  Physical Exam  Vitals signs and nursing note reviewed  Constitutional:       Appearance: Normal appearance  He is well-developed  He is obese  HENT:      Head: Normocephalic and atraumatic  Right Ear: Tympanic membrane normal       Left Ear: Tympanic membrane normal    Eyes:      Pupils: Pupils are equal, round, and reactive to light  Neck:      Musculoskeletal: Normal range of motion and neck supple  Cardiovascular:      Rate and Rhythm: Normal rate and regular rhythm  Pulses: Normal pulses  Heart sounds: Normal heart sounds  Pulmonary:      Effort: Pulmonary effort is normal       Breath sounds: Normal breath sounds  Abdominal:      General: Bowel sounds are normal       Palpations: Abdomen is soft  Musculoskeletal: Normal range of motion  Skin:     General: Skin is warm and dry  Capillary Refill: Capillary refill takes less than 2 seconds  Findings: Lesion present  Comments: L upper back soft tissue mass measure [id number] cm mild tender on palpation  R upper back soft tissue mass measure 9x11x3 cm  LLQ abdomen soft tissue mass measure 9x6 cm  L parotid gland swelling    Neurological:      General: No focal deficit present  Mental Status: He is alert and oriented to person, place, and time  Mental status is at baseline  Psychiatric:         Mood and Affect: Mood normal          Behavior: Behavior normal          Thought Content:  Thought content normal          Judgment: Judgment normal

## 2021-02-21 LAB — HIV 1+2 AB+HIV1 P24 AG SERPL QL IA: NORMAL

## 2021-02-22 LAB
ACE SERPL-CCNC: 50 U/L (ref 14–82)
RHEUMATOID FACT SER QL LA: NEGATIVE
RYE IGE QN: NEGATIVE

## 2021-02-23 ENCOUNTER — TELEPHONE (OUTPATIENT)
Dept: FAMILY MEDICINE CLINIC | Facility: CLINIC | Age: 56
End: 2021-02-23

## 2021-02-23 LAB
B BURGDOR IGG+IGM SER-ACNC: 11
MEV IGG SER QL: ABNORMAL
MUV IGG SER QL: ABNORMAL

## 2021-02-23 NOTE — TELEPHONE ENCOUNTER
Would someone be able to look at his lab results so that I can call him back  Some are still pending but the majority are back

## 2021-02-23 NOTE — TELEPHONE ENCOUNTER
Nothing urgent that I see   Prediabetic with elevated choelsterol   But he should ibis for Dr Emanuel Harrison to review everything and call him or he can set up follow up appt   Dr Emanuel Harrison likes to review her own labs in deatil with patient

## 2021-02-24 ENCOUNTER — TELEPHONE (OUTPATIENT)
Dept: FAMILY MEDICINE CLINIC | Facility: CLINIC | Age: 56
End: 2021-02-24

## 2021-02-24 ENCOUNTER — CONSULT (OUTPATIENT)
Dept: SURGERY | Facility: CLINIC | Age: 56
End: 2021-02-24
Payer: COMMERCIAL

## 2021-02-24 VITALS
HEIGHT: 63 IN | SYSTOLIC BLOOD PRESSURE: 124 MMHG | HEART RATE: 75 BPM | WEIGHT: 284 LBS | DIASTOLIC BLOOD PRESSURE: 74 MMHG | TEMPERATURE: 97.8 F | RESPIRATION RATE: 18 BRPM | BODY MASS INDEX: 50.32 KG/M2

## 2021-02-24 DIAGNOSIS — G47.30 SLEEP APNEA: ICD-10-CM

## 2021-02-24 DIAGNOSIS — Z86.2 HISTORY OF SARCOIDOSIS: ICD-10-CM

## 2021-02-24 DIAGNOSIS — E66.01 MORBID OBESITY (HCC): ICD-10-CM

## 2021-02-24 DIAGNOSIS — R22.2 ABDOMINAL WALL MASS: ICD-10-CM

## 2021-02-24 DIAGNOSIS — R22.2 CHEST WALL MASS: Primary | ICD-10-CM

## 2021-02-24 DIAGNOSIS — R68.84 JAW PAIN: ICD-10-CM

## 2021-02-24 DIAGNOSIS — M79.89 SOFT TISSUE MASS: ICD-10-CM

## 2021-02-24 PROCEDURE — 99214 OFFICE O/P EST MOD 30 MIN: CPT | Performed by: SURGERY

## 2021-02-24 NOTE — PROGRESS NOTES
Assessment/Plan: patient's has multiple medical issues  I told him that I am going to wait for the report of the ultrasound of the head and neck area and ultrasound of the back  Because of the size of the back masses he definitely needs a CT of the chest wall  I also need to send him for a CT of the abdomen and pelvis to differentiate whether the mass in the left lower quadrant is hernia or a subcutaneous mass  Patient need to follow up with the bariatric program for consideration for weight loss surgery  He need to see his pulmonologist regarding sleep apnea  I will see him again in my office after the above investigations have been  Done  He verbalized understanding  No problem-specific Assessment & Plan notes found for this encounter  Diagnoses and all orders for this visit:    Chest wall mass  -     Cancel: CT chest with contrast; Future  -     CT chest abdomen pelvis w contrast; Future    Soft tissue mass  -     Ambulatory referral to General Surgery    History of sarcoidosis    Abdominal wall mass  -     Cancel: CT chest abdomen pelvis w contrast; Future  -     CT chest abdomen pelvis w contrast; Future    Sleep apnea    Morbid obesity ([address] )    Jaw pain          Subjective:      Patient ID: Estela Bates is a 54 y o  male  49-year-old male patient was sent to me for masses over his back  He does have multiple problems the  He told me he has large masses over his back  He does not know how long he has had them  Those masses were examined and found by his primary care doctor  He also says that he has mass over his left lower abdomen  He has not had any abdominal surgery  He does not complain of any pain in that mass  No change in the size of that mass  Patient suffers from morbid obesity and sleep apnea  He infrequently uses CPAP machine however he he has not seen a pulmonologist   Patient also complains of the pain in his left jaw    He says he has pain on eating and the feels clicking sound on the left earlobe  The pain does not last after he has stopped eating  Patient had ultrasound of his back mass as well as left parotid region however they have not been reported yet  He also tells me that he has reached out to bariatric program however he does not want surgery and would like to lose weight by  nonsurgical means  The following portions of the patient's history were reviewed and updated as appropriate:   He  has a past medical history of Anxiety, GERD (gastroesophageal reflux disease), Joint pain in both hands (2/19/2021), Kidney stone, Kidney stones (2/19/2021), Mass of anterior abdominal wall (2/19/2021), MI (myocardial infarction) ([facility] [address] ), Morbid obesity with BMI of 50 0-59 9, adult ([facility] [address] ), Parotid gland enlargement (2/19/2021), Sarcoidosis (2/19/2021), Sleep apnea, and Soft tissue mass (2/19/2021)  He   Patient Active Problem List    Diagnosis Date Noted    Kidney stones 02/19/2021    Sarcoidosis 02/19/2021    Mass of anterior abdominal wall 02/19/2021    Soft tissue mass 02/19/2021    Parotid gland enlargement 02/19/2021    Joint pain in both hands 02/19/2021    Elevated fasting glucose 08/04/2020    Sleep apnea 04/29/2020    Anxiety 04/29/2020    Vitamin D deficiency 04/29/2020    Morbid obesity with BMI of 50 0-59 9, adult ([facility] [address] ) 05/31/2019    GERD (gastroesophageal reflux disease) 05/31/2019    Hyperlipidemia 05/31/2019    Osteoarthritis of right knee 05/31/2019     He  has a past surgical history that includes Knee Arthroplasty and Hand surgery (Bilateral, 2019)  His family history includes Cancer in his father; Diabetes in his maternal grandfather and mother; Heart disease in his father, mother, paternal aunt, and paternal uncle  He  reports that he has never smoked  He has never used smokeless tobacco  He reports that he does not drink alcohol or use drugs    Current Outpatient Medications   Medication Sig Dispense Refill    Albuterol Sulfate (VENTOLIN HFA IN) Ventolin NEBU    Refills: 0       Active      Aspirin (ASPIR-81 PO) Take 81 mg by mouth daily Aspir-81 TBEC  Refills: 0  Active       atorvastatin (LIPITOR) 20 mg tablet Take 20 mg by mouth daily      Cholecalciferol (VITAMIN D PO) Take 5,000 Units by mouth daily      escitalopram (LEXAPRO) 10 mg tablet Lexapro 10 MG Oral Tablet  Refills: 0  Active      Omega-3 Fatty Acids (FISH OIL) 1200 MG CPDR Take 1 capsule by mouth daily       pantoprazole (PROTONIX) 40 mg tablet Take 40 mg by mouth daily      pantoprazole (PROTONIX) 40 mg Take 40 mg by mouth daily Protonix 40 MG Oral Packet  Refills: 0  Active        No current facility-administered medications for this visit  Current Outpatient Medications on File Prior to Visit   Medication Sig    Albuterol Sulfate (VENTOLIN HFA IN) Ventolin NEBU    Refills: 0       Active    Aspirin (ASPIR-81 PO) Take 81 mg by mouth daily Aspir-81 TBEC  Refills: 0  Active     atorvastatin (LIPITOR) 20 mg tablet Take 20 mg by mouth daily    Cholecalciferol (VITAMIN D PO) Take 5,000 Units by mouth daily    escitalopram (LEXAPRO) 10 mg tablet Lexapro 10 MG Oral Tablet  Refills: 0  Active    Omega-3 Fatty Acids (FISH OIL) 1200 MG CPDR Take 1 capsule by mouth daily     pantoprazole (PROTONIX) 40 mg tablet Take 40 mg by mouth daily    pantoprazole (PROTONIX) 40 mg Take 40 mg by mouth daily Protonix 40 MG Oral Packet  Refills: 0  Active      No current facility-administered medications on file prior to visit  He is allergic to azithromycin; erythromycin; and erythromycin base       Review of Systems   Constitutional: Negative  HENT: Negative  Eyes: Negative  Respiratory: Negative  Cardiovascular: Negative  Gastrointestinal: Negative  Endocrine: Negative  Genitourinary: Negative  Musculoskeletal: Negative  Skin: Negative  Allergic/Immunologic: Negative  Neurological: Negative  Hematological: Negative      Psychiatric/Behavioral: Negative  Objective:      /74 (BP Location: Left arm, Patient Position: Sitting, Cuff Size: Adult)   Pulse 75   Temp 97 8 °F (36 6 °C)   Resp 18   Ht 5' 3" (1 6 m)   Wt 129 kg (284 lb)   BMI 50 31 kg/m²          Physical Exam  Constitutional:       Appearance: He is obese  HENT:      Head: Normocephalic and atraumatic  Jaw: There is normal jaw occlusion  Tenderness (He is mildly tender over the left TMJ joint) present  No trismus  Salivary Glands: Right salivary gland is not diffusely enlarged or tender  Left salivary gland is not diffusely enlarged or tender  Comments:   Patient has short wide neck  And increased amount of subcutaneous tissue under the chin     Right Ear: External ear normal       Left Ear: External ear normal       Nose: Nose normal       Mouth/Throat:      Mouth: Mucous membranes are moist    Pulmonary:       Abdominal:      General: Bowel sounds are normal  There is no distension  Palpations: Abdomen is soft  Tenderness: There is no abdominal tenderness  Musculoskeletal: Normal range of motion  Skin:     General: Skin is warm  Neurological:      General: No focal deficit present  Mental Status: He is alert and oriented to person, place, and time     Psychiatric:         Mood and Affect: Mood normal          Behavior: Behavior normal

## 2021-02-24 NOTE — TELEPHONE ENCOUNTER
[facility] called to let you know there are significant findings in the ultrasound of the head/neck done on 2/20   Please review

## 2021-02-24 NOTE — TELEPHONE ENCOUNTER
Not all labs are back, will reach out to pt once everything is back  I'll address those myself   Thank you

## 2021-02-25 ENCOUNTER — TELEPHONE (OUTPATIENT)
Dept: SURGERY | Facility: CLINIC | Age: 56
End: 2021-02-25

## 2021-02-25 NOTE — TELEPHONE ENCOUNTER
Post visit phone call  Patient was very satisfied and pleased with the staff and doctor  He felt very confident with Dr Merlin Emery and said over and over how much at ease he felt with the care he received

## 2021-03-02 ENCOUNTER — HOSPITAL ENCOUNTER (OUTPATIENT)
Dept: CT IMAGING | Facility: HOSPITAL | Age: 56
Discharge: HOME | End: 2021-03-02
Attending: SURGERY
Payer: COMMERCIAL

## 2021-03-02 DIAGNOSIS — R22.2 ABDOMINAL WALL MASS: ICD-10-CM

## 2021-03-02 DIAGNOSIS — R22.2 CHEST WALL MASS: ICD-10-CM

## 2021-03-02 PROCEDURE — 74177 CT ABD & PELVIS W/CONTRAST: CPT

## 2021-03-02 PROCEDURE — 71260 CT THORAX DX C+: CPT

## 2021-03-02 PROCEDURE — G1004 CDSM NDSC: HCPCS

## 2021-03-02 RX ADMIN — IODIXANOL 100 ML: 320 INJECTION, SOLUTION INTRAVASCULAR at 09:41

## 2021-03-03 ENCOUNTER — CONSULT (OUTPATIENT)
Dept: PULMONOLOGY | Facility: CLINIC | Age: 56
End: 2021-03-03
Payer: COMMERCIAL

## 2021-03-03 ENCOUNTER — OFFICE VISIT (OUTPATIENT)
Dept: SURGERY | Facility: CLINIC | Age: 56
End: 2021-03-03
Payer: COMMERCIAL

## 2021-03-03 VITALS
BODY MASS INDEX: 50.32 KG/M2 | TEMPERATURE: 98.5 F | HEIGHT: 63 IN | SYSTOLIC BLOOD PRESSURE: 140 MMHG | RESPIRATION RATE: 18 BRPM | DIASTOLIC BLOOD PRESSURE: 78 MMHG | WEIGHT: 284 LBS | HEART RATE: 90 BPM

## 2021-03-03 VITALS
DIASTOLIC BLOOD PRESSURE: 78 MMHG | WEIGHT: 284 LBS | OXYGEN SATURATION: 97 % | HEART RATE: 90 BPM | BODY MASS INDEX: 50.32 KG/M2 | HEIGHT: 63 IN | TEMPERATURE: 98.5 F | SYSTOLIC BLOOD PRESSURE: 140 MMHG

## 2021-03-03 DIAGNOSIS — R60.0 SWELLING OF LEFT PAROTID GLAND: ICD-10-CM

## 2021-03-03 DIAGNOSIS — Z01.818 PREOPERATIVE EVALUATION OF A MEDICAL CONDITION TO RULE OUT SURGICAL CONTRAINDICATIONS (TAR REQUIRED): ICD-10-CM

## 2021-03-03 DIAGNOSIS — R22.2 CHEST WALL MASS: Primary | ICD-10-CM

## 2021-03-03 DIAGNOSIS — D86.9 SARCOIDOSIS: ICD-10-CM

## 2021-03-03 DIAGNOSIS — R22.2 ABDOMINAL WALL MASS: ICD-10-CM

## 2021-03-03 DIAGNOSIS — G47.33 OBSTRUCTIVE SLEEP APNEA SYNDROME: Primary | ICD-10-CM

## 2021-03-03 PROCEDURE — 99213 OFFICE O/P EST LOW 20 MIN: CPT | Performed by: SURGERY

## 2021-03-03 PROCEDURE — 99243 OFF/OP CNSLTJ NEW/EST LOW 30: CPT | Performed by: INTERNAL MEDICINE

## 2021-03-03 NOTE — H&P
Assessment/Plan:  We discussed the report of the CT scan  I told him that the abdominal mass is not a hernia but fat necrosis  He is going to see the pulmonologist for pulmonary clearance prior to excision of the back mass  I also told him that he should be seeing a ENT surgeon to assess the parotid mass  I am going to schedule him for excision of the back mass under general anesthesia on 25th March at [facility]  I also told him because of the size of the cavity he may end up having a drain  He verbalized understanding and signed the consent  No problem-specific Assessment & Plan notes found for this encounter  Diagnoses and all orders for this visit:    Chest wall mass    Swelling of left parotid gland  -     Ambulatory Referral to Otolaryngology; Future    Abdominal wall mass          Subjective:      Patient ID: Susan Sanabria is a 54 y o  male  43-year-old male patient who is here for follow-up regarding the CT scan which I ordered last week to assess the masses over her posterior chest wall anterior abdominal wall  In the meantime he had also had a ultrasound to assess the left parotid  Patient has no new complaints today  The following portions of the patient's history were reviewed and updated as appropriate:   He  has a past medical history of Anxiety, GERD (gastroesophageal reflux disease), Joint pain in both hands (2/19/2021), Kidney stone, Kidney stones (2/19/2021), Mass of anterior abdominal wall (2/19/2021), MI (myocardial infarction) ([address] ), Morbid obesity with BMI of 50 0-59 9, adult ([address] ), Parotid gland enlargement (2/19/2021), Sarcoidosis (2/19/2021), Sleep apnea, and Soft tissue mass (2/19/2021)    He   Patient Active Problem List    Diagnosis Date Noted    Kidney stones 02/19/2021    Sarcoidosis 02/19/2021    Mass of anterior abdominal wall 02/19/2021    Soft tissue mass 02/19/2021    Parotid gland enlargement 02/19/2021    Joint pain in both hands 02/19/2021  Elevated fasting glucose 08/04/2020    Sleep apnea 04/29/2020    Anxiety 04/29/2020    Vitamin D deficiency 04/29/2020    Morbid obesity with BMI of 50 0-59 9, adult ([facility] [address] ) 05/31/2019    GERD (gastroesophageal reflux disease) 05/31/2019    Hyperlipidemia 05/31/2019    Osteoarthritis of right knee 05/31/2019     He  has a past surgical history that includes Knee Arthroplasty and Hand surgery (Bilateral, 2019)  His family history includes Cancer in his father; Diabetes in his maternal grandfather and mother; Heart disease in his father, mother, paternal aunt, and paternal uncle  He  reports that he has never smoked  He has never used smokeless tobacco  He reports that he does not drink alcohol or use drugs  Current Outpatient Medications   Medication Sig Dispense Refill    Albuterol Sulfate (VENTOLIN HFA IN) Ventolin NEBU    Refills: 0       Active      Aspirin (ASPIR-81 PO) Take 81 mg by mouth daily Aspir-81 TBEC  Refills: 0  Active       atorvastatin (LIPITOR) 20 mg tablet Take 20 mg by mouth daily      Cholecalciferol (VITAMIN D PO) Take 5,000 Units by mouth daily      escitalopram (LEXAPRO) 10 mg tablet Lexapro 10 MG Oral Tablet  Refills: 0  Active      Omega-3 Fatty Acids (FISH OIL) 1200 MG CPDR Take 1 capsule by mouth daily       pantoprazole (PROTONIX) 40 mg tablet Take 40 mg by mouth daily      pantoprazole (PROTONIX) 40 mg Take 40 mg by mouth daily Protonix 40 MG Oral Packet  Refills: 0  Active        No current facility-administered medications for this visit        Current Outpatient Medications on File Prior to Visit   Medication Sig    Albuterol Sulfate (VENTOLIN HFA IN) Ventolin NEBU    Refills: 0       Active    Aspirin (ASPIR-81 PO) Take 81 mg by mouth daily Aspir-81 TBEC  Refills: 0  Active     atorvastatin (LIPITOR) 20 mg tablet Take 20 mg by mouth daily    Cholecalciferol (VITAMIN D PO) Take 5,000 Units by mouth daily    escitalopram (LEXAPRO) 10 mg tablet Lexapro 10 MG Oral Tablet  Refills: 0  Active    Omega-3 Fatty Acids (FISH OIL) 1200 MG CPDR Take 1 capsule by mouth daily     pantoprazole (PROTONIX) 40 mg tablet Take 40 mg by mouth daily    pantoprazole (PROTONIX) 40 mg Take 40 mg by mouth daily Protonix 40 MG Oral Packet  Refills: 0  Active      No current facility-administered medications on file prior to visit  He is allergic to azithromycin; erythromycin; and erythromycin base       Review of Systems   Constitutional: Negative  HENT: Negative  Eyes: Negative  Respiratory: Negative  Cardiovascular: Negative  Gastrointestinal: Negative  Endocrine: Negative  Genitourinary: Negative  Musculoskeletal: Negative  Allergic/Immunologic: Negative  Neurological: Negative  Hematological: Negative  Psychiatric/Behavioral: Negative  Objective:      /78 (BP Location: Left arm, Patient Position: Sitting, Cuff Size: Adult)   Pulse 90   Temp 98 5 °F (36 9 °C)   Resp 18   Ht 5' 3" (1 6 m)   Wt 129 kg (284 lb)   BMI 50 31 kg/m²          Physical Exam  Vitals signs reviewed  Constitutional:       Appearance: He is obese  HENT:      Head: Normocephalic and atraumatic  Mouth/Throat:      Mouth: Mucous membranes are moist    Cardiovascular:      Rate and Rhythm: Normal rate and regular rhythm  Pulses: Normal pulses  Heart sounds: Normal heart sounds  Pulmonary:      Effort: Pulmonary effort is normal       Breath sounds: Normal breath sounds  Abdominal:      General: Abdomen is flat  Palpations: There is mass (Left lower quadrant)  Neurological:      Mental Status: He is alert

## 2021-03-03 NOTE — PROGRESS NOTES
Assessment/Plan:  We discussed the report of the CT scan  I told him that the abdominal mass is not a hernia but fat necrosis  He is going to see the pulmonologist for pulmonary clearance prior to excision of the back mass  I also told him that he should be seeing a ENT surgeon to assess the parotid mass  I am going to schedule him for excision of the back mass under general anesthesia on 25th March at [facility]  I also told him because of the size of the cavity he may end up having a drain  He verbalized understanding and signed the consent  No problem-specific Assessment & Plan notes found for this encounter  Diagnoses and all orders for this visit:    Chest wall mass    Swelling of left parotid gland  -     Ambulatory Referral to Otolaryngology; Future    Abdominal wall mass          Subjective:      Patient ID: Juliana Trinidad is a 54 y o  male  51-year-old male patient who is here for follow-up regarding the CT scan which I ordered last week to assess the masses over her posterior chest wall anterior abdominal wall  In the meantime he had also had a ultrasound to assess the left parotid  Patient has no new complaints today  The following portions of the patient's history were reviewed and updated as appropriate:   He  has a past medical history of Anxiety, GERD (gastroesophageal reflux disease), Joint pain in both hands (2/19/2021), Kidney stone, Kidney stones (2/19/2021), Mass of anterior abdominal wall (2/19/2021), MI (myocardial infarction) ([facility] [address] ), Morbid obesity with BMI of 50 0-59 9, adult ([facility] [address] ), Parotid gland enlargement (2/19/2021), Sarcoidosis (2/19/2021), Sleep apnea, and Soft tissue mass (2/19/2021)    He   Patient Active Problem List    Diagnosis Date Noted    Kidney stones 02/19/2021    Sarcoidosis 02/19/2021    Mass of anterior abdominal wall 02/19/2021    Soft tissue mass 02/19/2021    Parotid gland enlargement 02/19/2021    Joint pain in both hands 02/19/2021  Elevated fasting glucose 08/04/2020    Sleep apnea 04/29/2020    Anxiety 04/29/2020    Vitamin D deficiency 04/29/2020    Morbid obesity with BMI of 50 0-59 9, adult ([facility] [address] ) 05/31/2019    GERD (gastroesophageal reflux disease) 05/31/2019    Hyperlipidemia 05/31/2019    Osteoarthritis of right knee 05/31/2019     He  has a past surgical history that includes Knee Arthroplasty and Hand surgery (Bilateral, 2019)  His family history includes Cancer in his father; Diabetes in his maternal grandfather and mother; Heart disease in his father, mother, paternal aunt, and paternal uncle  He  reports that he has never smoked  He has never used smokeless tobacco  He reports that he does not drink alcohol or use drugs  Current Outpatient Medications   Medication Sig Dispense Refill    Albuterol Sulfate (VENTOLIN HFA IN) Ventolin NEBU    Refills: 0       Active      Aspirin (ASPIR-81 PO) Take 81 mg by mouth daily Aspir-81 TBEC  Refills: 0  Active       atorvastatin (LIPITOR) 20 mg tablet Take 20 mg by mouth daily      Cholecalciferol (VITAMIN D PO) Take 5,000 Units by mouth daily      escitalopram (LEXAPRO) 10 mg tablet Lexapro 10 MG Oral Tablet  Refills: 0  Active      Omega-3 Fatty Acids (FISH OIL) 1200 MG CPDR Take 1 capsule by mouth daily       pantoprazole (PROTONIX) 40 mg tablet Take 40 mg by mouth daily      pantoprazole (PROTONIX) 40 mg Take 40 mg by mouth daily Protonix 40 MG Oral Packet  Refills: 0  Active        No current facility-administered medications for this visit        Current Outpatient Medications on File Prior to Visit   Medication Sig    Albuterol Sulfate (VENTOLIN HFA IN) Ventolin NEBU    Refills: 0       Active    Aspirin (ASPIR-81 PO) Take 81 mg by mouth daily Aspir-81 TBEC  Refills: 0  Active     atorvastatin (LIPITOR) 20 mg tablet Take 20 mg by mouth daily    Cholecalciferol (VITAMIN D PO) Take 5,000 Units by mouth daily    escitalopram (LEXAPRO) 10 mg tablet Lexapro 10 MG Oral Tablet  Refills: 0  Active    Omega-3 Fatty Acids (FISH OIL) 1200 MG CPDR Take 1 capsule by mouth daily     pantoprazole (PROTONIX) 40 mg tablet Take 40 mg by mouth daily    pantoprazole (PROTONIX) 40 mg Take 40 mg by mouth daily Protonix 40 MG Oral Packet  Refills: 0  Active      No current facility-administered medications on file prior to visit  He is allergic to azithromycin; erythromycin; and erythromycin base       Review of Systems   Constitutional: Negative  HENT: Negative  Eyes: Negative  Respiratory: Negative  Cardiovascular: Negative  Gastrointestinal: Negative  Endocrine: Negative  Genitourinary: Negative  Musculoskeletal: Negative  Allergic/Immunologic: Negative  Neurological: Negative  Hematological: Negative  Psychiatric/Behavioral: Negative  Objective:      /78 (BP Location: Left arm, Patient Position: Sitting, Cuff Size: Adult)   Pulse 90   Temp 98 5 °F (36 9 °C)   Resp 18   Ht 5' 3" (1 6 m)   Wt 129 kg (284 lb)   BMI 50 31 kg/m²          Physical Exam  Vitals signs reviewed  Constitutional:       Appearance: He is obese  HENT:      Head: Normocephalic and atraumatic  Mouth/Throat:      Mouth: Mucous membranes are moist    Cardiovascular:      Rate and Rhythm: Normal rate and regular rhythm  Pulses: Normal pulses  Heart sounds: Normal heart sounds  Pulmonary:      Effort: Pulmonary effort is normal       Breath sounds: Normal breath sounds  Abdominal:      General: Abdomen is flat  Palpations: There is mass (Left lower quadrant)  Neurological:      Mental Status: He is alert

## 2021-03-04 ENCOUNTER — TELEMEDICINE (OUTPATIENT)
Dept: FAMILY MEDICINE CLINIC | Facility: CLINIC | Age: 56
End: 2021-03-04
Payer: COMMERCIAL

## 2021-03-04 VITALS — BODY MASS INDEX: 49.61 KG/M2 | HEIGHT: 63 IN | WEIGHT: 280 LBS

## 2021-03-04 DIAGNOSIS — K11.1 PAROTID GLAND ENLARGEMENT: Primary | ICD-10-CM

## 2021-03-04 DIAGNOSIS — E53.8 CYANOCOBALAMIN DEFICIENCY: ICD-10-CM

## 2021-03-04 DIAGNOSIS — E78.5 HYPERLIPIDEMIA, UNSPECIFIED HYPERLIPIDEMIA TYPE: ICD-10-CM

## 2021-03-04 DIAGNOSIS — G47.33 OBSTRUCTIVE SLEEP APNEA SYNDROME: ICD-10-CM

## 2021-03-04 DIAGNOSIS — R79.82 ELEVATED C-REACTIVE PROTEIN (CRP): ICD-10-CM

## 2021-03-04 DIAGNOSIS — K65.4 FAT NECROSIS OF ABDOMINAL WALL (HCC): ICD-10-CM

## 2021-03-04 DIAGNOSIS — E79.0 ELEVATED BLOOD URIC ACID LEVEL: ICD-10-CM

## 2021-03-04 DIAGNOSIS — E55.9 VITAMIN D DEFICIENCY: ICD-10-CM

## 2021-03-04 DIAGNOSIS — R73.03 PREDIABETES: ICD-10-CM

## 2021-03-04 DIAGNOSIS — M79.89 SOFT TISSUE MASS: ICD-10-CM

## 2021-03-04 DIAGNOSIS — R70.0 ESR RAISED: ICD-10-CM

## 2021-03-04 DIAGNOSIS — E66.01 MORBID OBESITY WITH BMI OF 50.0-59.9, ADULT (HCC): ICD-10-CM

## 2021-03-04 DIAGNOSIS — Z78.9 NONIMMUNE TO HEPATITIS B VIRUS: ICD-10-CM

## 2021-03-04 DIAGNOSIS — R22.2 MASS OF ANTERIOR ABDOMINAL WALL: ICD-10-CM

## 2021-03-04 DIAGNOSIS — Z28.39 IMMUNIZATION DEFICIENCY: ICD-10-CM

## 2021-03-04 PROBLEM — Z01.818 PREOPERATIVE EVALUATION OF A MEDICAL CONDITION TO RULE OUT SURGICAL CONTRAINDICATIONS (TAR REQUIRED): Status: ACTIVE | Noted: 2021-03-04

## 2021-03-04 PROCEDURE — 99214 OFFICE O/P EST MOD 30 MIN: CPT | Performed by: FAMILY MEDICINE

## 2021-03-04 RX ORDER — RIBOFLAVIN (VITAMIN B2) 100 MG
100 TABLET ORAL DAILY
COMMUNITY

## 2021-03-04 RX ORDER — MULTIVIT-MIN/IRON FUM/FOLIC AC 7.5 MG-4
1 TABLET ORAL DAILY
COMMUNITY

## 2021-03-04 NOTE — ASSESSMENT & PLAN NOTE
Patient has a distant history of diagnosis of pulmonary sarcoidosis  Patient states that he had bronchoscopy approximately 20 years ago with lymph node biopsies and transbronchial biopsies presumably demonstrating granulomas  I do not have any of that documentation  Current CT scan does not demonstrate any mediastinal adenopathy nor parenchymal infiltrates  Patient has no symptoms of pulmonary impairment other than that related to weight  No contraindications to proceeding with proposed surgery  Note also the patient has a longstanding partial atelectasis of the medial segment of the right middle lobe which has been present at least for 3 and half years by CT scan  No further evaluation seems necessary  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

## 2021-03-04 NOTE — ASSESSMENT & PLAN NOTE
This patient was seen regarding upcoming surgery for large lipoma on the back  Patient has a longstanding history of obstructive sleep apnea  He states that he uses this device every night  He has a daytime sleepiness during the day only when he uses the device less than 8 hours  No technical problems with the use of this device  Advised to bring the CPAP unit to the hospital for use after proposed surgery  No contraindication to proposed proceeding with proposed surgery from my standpoint  See also comments under sarcoidosis

## 2021-03-04 NOTE — PROGRESS NOTES
Virtual Regular Visit      Assessment/Plan:    Problem List Items Addressed This Visit        Digestive    Parotid gland enlargement - Primary       Respiratory    Sleep apnea       Other    Morbid obesity with BMI of 50 0-59 9, adult (HCC)    Hyperlipidemia    Vitamin D deficiency    Mass of anterior abdominal wall    Soft tissue mass    Prediabetes    Nonimmune to hepatitis B virus    Immunization deficiency    Elevated blood uric acid level    Cyanocobalamin deficiency    Elevated C-reactive protein (CRP)    ESR raised    Fat necrosis of abdominal wall ([address] )               Reason for visit is   Chief Complaint   Patient presents with    Follow-up     blood work result    Virtual Regular Visit        Encounter provider Frank Fortune MD    Provider located at [address] [place] [address]  [address] [facility] [id number]  [address]  [facility] 139 Visits  No visits were found meeting these conditions  Showing recent visits within past 7 days and meeting all other requirements     Today's Visits  Date Type Provider Dept   03/04/21 Telemedicine Frank Fortune MD [place]g [id number] [address] [facility] today's visits and meeting all other requirements     Future Appointments  No visits were found meeting these conditions  Showing future appointments within next 150 days and meeting all other requirements        The patient was identified by name and date of birth  Fartun Flank was informed that this is a telemedicine visit and that the visit is being conducted through [employer] and patient was informed that this is not a secure, HIPAA-compliant platform  He agrees to proceed     My office door was closed  No one else was in the room  He acknowledged consent and understanding of privacy and security of the video platform  The patient has agreed to participate and understands they can discontinue the visit at any time  Patient is aware this is a billable service  Subjective  Kaykay Ramirez is a 54 y o  male    49-year-old male follow-up blood test results and imaging results  Discussed imaging results with patient in great detail  CT of the chest show left upper lung full 4 mm nodule  Left lower quadrant 3 6 cm fat necrosis in abdomen  An 18 cm left posterior chest wall soft tissue mass  He has right middle lobe syndrome syndrome with history of pneumonia in the past   He did see pulmonologist who cleared him for upcoming surgery  He had history sarcoidosis in the past but currently his lungs are fine  He had ultrasound the neck show 1 2 cm x 0 7 cm right parotid gland mass and 1 1 x 1 5 cm left parotid gland mass  He has referral for ENT evaluation  Of note he is not immune to rubella and months  He is not immune to hep a and hep B infection  He blood test show hemoglobin A1c 6 2 prediabetic  Triglycerides high 160  LDL high at 127  He is on cholesterol medication  PSA level is 1 8  Uric acid level 6 9  He denies any gouty attack in the past   He vitamin B12 is low 450  Vitamin-D is low at 26  CRP is high as 12 9  ESR is high 32  Of note cortisol level is normal at 10  Concern was for  cushing syndrome with  cushingoid features but cortisol level came back normal   He recently saw a general surgeon for evaluation of removal large fatty tumor on his back  Upcoming surgery will be scheduled for March 25th  He test negative for autoimmune rheumatoid arthritis  Patient advised for fish oil to bring down his triglyceride  Exercise diet weight loss to bring down his sugar  He will recheck in 6 months  Advised replace B12 vitamin-D  Advised for diet to lower his uric acid level  Will monitor his sed rate and CRP  He consider B complex the supplement  In 6 months if not better will increase atorvastatin to 40 mg daily  Advised to get mumps measles rubella vaccine now and in 1 month  Will also get hep a and hep B vaccine for him    Close monitor needed  Past Medical History:   Diagnosis Date    Anxiety     GERD (gastroesophageal reflux disease)     Joint pain in both hands 2/19/2021    Kidney stone     Kidney stones 2/19/2021    Mass of anterior abdominal wall 2/19/2021    LLQ 9x6 cm    [facility] [address] [facility]     Morbid obesity with BMI of 50 0-59 9, adult ([facility] )     Nonimmune to hepatitis B virus 3/4/2021    Parotid gland enlargement 2/19/2021    left    Prediabetes 3/4/2021    Sarcoidosis 2/19/2021    Sleep apnea     cpap    Soft tissue mass 2/19/2021    R upper back 9x 11x3cm L upper back 14x20x 4 cm       Past Surgical History:   Procedure Laterality Date    HAND SURGERY Bilateral 2019    KNEE ARTHROPLASTY         Current Outpatient Medications   Medication Sig Dispense Refill    Ascorbic Acid (vitamin C) 100 MG tablet Take 100 mg by mouth daily      Aspirin (ASPIR-81 PO) Take 81 mg by mouth daily Aspir-81 TBEC  Refills: 0  Active       atorvastatin (LIPITOR) 20 mg tablet Take 20 mg by mouth daily      Cholecalciferol (VITAMIN D PO) Take 5,000 Units by mouth daily      escitalopram (LEXAPRO) 10 mg tablet Lexapro 10 MG Oral Tablet  Refills: 0  Active      Multiple Vitamins-Minerals (multivitamin with minerals) tablet Take 1 tablet by mouth daily      Omega-3 Fatty Acids (FISH OIL) 1200 MG CPDR Take 1 capsule by mouth daily       pantoprazole (PROTONIX) 40 mg Take 40 mg by mouth daily Protonix 40 MG Oral Packet  Refills: 0  Active       Albuterol Sulfate (VENTOLIN HFA IN) Ventolin NEBU    Refills: 0       Active      pantoprazole (PROTONIX) 40 mg tablet Take 40 mg by mouth daily       No current facility-administered medications for this visit  Allergies   Allergen Reactions    Azithromycin Hives    Erythromycin Hives    Erythromycin Base Hives       Review of Systems   Constitutional: Negative for activity change, appetite change, fatigue, fever and unexpected weight change     HENT: Negative for dental problem and trouble swallowing  Eyes: Negative for photophobia and visual disturbance  Respiratory: Negative for cough and chest tightness  Cardiovascular: Negative for chest pain, palpitations and leg swelling  Gastrointestinal: Negative for abdominal pain, constipation and vomiting  Endocrine: Negative for cold intolerance, polydipsia and polyuria  Genitourinary: Negative for difficulty urinating, frequency and urgency  Musculoskeletal: Negative for arthralgias, joint swelling, myalgias and neck pain  Skin: Negative for color change, rash and wound  Allergic/Immunologic: Negative for environmental allergies  Neurological: Negative for dizziness, weakness and numbness  Hematological: Does not bruise/bleed easily  Psychiatric/Behavioral: Negative for decreased concentration, dysphoric mood, self-injury, sleep disturbance and suicidal ideas  Video Exam    Vitals:    03/04/21 0909   Weight: 127 kg (280 lb)   Height: 5' 3" (1 6 m)       Physical Exam  Vitals signs and nursing note reviewed  Constitutional:       Appearance: Normal appearance  He is obese  Pulmonary:      Effort: Pulmonary effort is normal    Neurological:      Mental Status: He is alert and oriented to person, place, and time  Psychiatric:         Mood and Affect: Mood normal          Behavior: Behavior normal          Thought Content: Thought content normal          Judgment: Judgment normal           I spent 25 minutes directly with the patient during this visit      Whit acknowledges that he has consented to an online visit or consultation  He understands that the online visit is based solely on information provided by him, and that, in the absence of a face-to-face physical evaluation by the physician, the diagnosis he receives is both limited and provisional in terms of accuracy and completeness   This is not intended to replace a full medical face-to-face evaluation by the physician  Christine Alonso understands and accepts these terms

## 2021-03-04 NOTE — PROGRESS NOTES
Assessment/Plan:    Preoperative evaluation of a medical condition to rule out surgical contraindications (TAR required)  This patient was seen regarding upcoming surgery for large lipoma on the back  Patient has a longstanding history of obstructive sleep apnea  He states that he uses this device every night  He has a daytime sleepiness during the day only when he uses the device less than 8 hours  No technical problems with the use of this device  Advised to bring the CPAP unit to the hospital for use after proposed surgery  No contraindication to proposed proceeding with proposed surgery from my standpoint  See also comments under sarcoidosis  Sarcoidosis  Patient has a distant history of diagnosis of pulmonary sarcoidosis  Patient states that he had bronchoscopy approximately 20 years ago with lymph node biopsies and transbronchial biopsies presumably demonstrating granulomas  I do not have any of that documentation  Current CT scan does not demonstrate any mediastinal adenopathy nor parenchymal infiltrates  Patient has no symptoms of pulmonary impairment other than that related to weight  No contraindications to proceeding with proposed surgery  Note also the patient has a longstanding partial atelectasis of the medial segment of the right middle lobe which has been present at least for 3 and half years by CT scan  No further evaluation seems necessary  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  There are no diagnoses linked to this encounter  Subjective:      Patient ID: Yamilet Rosado is a 54 y o  male  This patient is seen in preparation for planned the excision of mass of the back which will require the patient to be under anesthesia for at least an hour and will be performed while the patient is prone  Patient has a longstanding history of obstructive sleep apnea  He has been using CPAP for number of years and is fully compliant    He does not have any symptoms other than daytime sleepiness when he sleeps less than 8 hours and he recognizes that this is due to sleep deprivation  No technical problems with the use of this device  Patient has a distant history of sarcoidosis which was investigated based on abnormal chest x-rays as far as I can determine  I believe about 20 years ago he had bronchoscopy presumably with transbronchial biopsies and presumably lymph node sampling  I do not have any direct information about the results of this procedure  Patient states that he was diagnosed with sarcoidosis at that time but did not require any therapy  Currently there is no radiographic evidence of mediastinal adenopathy nor parenchymal infiltrates  Patient does not have any respiratory symptoms to speak of other than recognized dyspnea related to weight  On patient's recent CT scan he has partial atelectasis of the medial segment of the right middle lobe  Similar atelectasis of was seen on a CT scan in 2017  Airway is not clearly obstructed but seems somewhat narrowed on the current CT scan  It is quite possible the patient developed right middle lobe syndrome related to mediastinal adenopathy when sarcoid was active  As the patient is asymptomatic and there has been no radiographic change in the last several years no further evaluation seems necessary  From a respiratory standpoint patient is cleared for proposed surgery  The following portions of the patient's history were reviewed and updated as appropriate: allergies, current medications, past family history, past medical history, past social history, past surgical history and problem list     Review of Systems   Constitutional: Negative for activity change and unexpected weight change  Respiratory: Positive for apnea  Negative for cough, shortness of breath and wheezing  Cardiovascular: Negative for chest pain, palpitations and leg swelling  Genitourinary: Negative for enuresis  Neurological: Negative for headaches  Objective:      /78 (BP Location: Left arm, Patient Position: Sitting, Cuff Size: Large)   Pulse 90   Temp 98 5 °F (36 9 °C) (Tympanic)   Ht 5' 3" (1 6 m)   Wt 129 kg (284 lb)   SpO2 97%   BMI 50 31 kg/m²          Physical Exam  Vitals signs reviewed  Constitutional:       Appearance: He is obese  He is not ill-appearing  Neck:      Musculoskeletal: No neck rigidity or muscular tenderness  Cardiovascular:      Rate and Rhythm: Normal rate and regular rhythm  Pulses:           Radial pulses are 3+ on the right side and 3+ on the left side  Heart sounds: Normal heart sounds  Pulmonary:      Effort: Pulmonary effort is normal       Breath sounds: Normal breath sounds  No wheezing, rhonchi or rales  Musculoskeletal:         General: No swelling  Right lower leg: No edema  Left lower leg: No edema  Lymphadenopathy:      Cervical: No cervical adenopathy  Skin:     General: Skin is warm and dry  Neurological:      Mental Status: He is alert and oriented to person, place, and time     Psychiatric:         Mood and Affect: Mood normal          Behavior: Behavior normal

## 2021-03-08 ENCOUNTER — CLINICAL SUPPORT (OUTPATIENT)
Dept: FAMILY MEDICINE CLINIC | Facility: CLINIC | Age: 56
End: 2021-03-08
Payer: COMMERCIAL

## 2021-03-08 DIAGNOSIS — Z23 ENCOUNTER FOR IMMUNIZATION: Primary | ICD-10-CM

## 2021-03-08 PROCEDURE — 90746 HEPB VACCINE 3 DOSE ADULT IM: CPT

## 2021-03-08 PROCEDURE — 90707 MMR VACCINE SC: CPT

## 2021-03-08 PROCEDURE — 90471 IMMUNIZATION ADMIN: CPT

## 2021-03-08 PROCEDURE — 90472 IMMUNIZATION ADMIN EACH ADD: CPT

## 2021-03-08 PROCEDURE — 90632 HEPA VACCINE ADULT IM: CPT

## 2021-03-18 ENCOUNTER — OFFICE VISIT (OUTPATIENT)
Dept: OBGYN CLINIC | Facility: CLINIC | Age: 56
End: 2021-03-18
Payer: COMMERCIAL

## 2021-03-18 ENCOUNTER — APPOINTMENT (OUTPATIENT)
Dept: RADIOLOGY | Facility: AMBULARY SURGERY CENTER | Age: 56
End: 2021-03-18
Attending: SURGERY
Payer: COMMERCIAL

## 2021-03-18 VITALS
SYSTOLIC BLOOD PRESSURE: 142 MMHG | HEART RATE: 88 BPM | BODY MASS INDEX: 51.03 KG/M2 | WEIGHT: 288 LBS | DIASTOLIC BLOOD PRESSURE: 81 MMHG | HEIGHT: 63 IN

## 2021-03-18 DIAGNOSIS — M18.11 ARTHRITIS OF CARPOMETACARPAL (CMC) JOINT OF RIGHT THUMB: ICD-10-CM

## 2021-03-18 DIAGNOSIS — M25.542 JOINT PAIN IN BOTH HANDS: ICD-10-CM

## 2021-03-18 DIAGNOSIS — R20.2 NUMBNESS AND TINGLING IN LEFT HAND: ICD-10-CM

## 2021-03-18 DIAGNOSIS — R20.0 NUMBNESS AND TINGLING IN LEFT HAND: ICD-10-CM

## 2021-03-18 DIAGNOSIS — M65.311 TRIGGER THUMB OF RIGHT HAND: ICD-10-CM

## 2021-03-18 DIAGNOSIS — M79.644 PAIN OF RIGHT THUMB: ICD-10-CM

## 2021-03-18 DIAGNOSIS — M79.644 PAIN OF RIGHT THUMB: Primary | ICD-10-CM

## 2021-03-18 DIAGNOSIS — M25.541 JOINT PAIN IN BOTH HANDS: ICD-10-CM

## 2021-03-18 PROCEDURE — 99244 OFF/OP CNSLTJ NEW/EST MOD 40: CPT | Performed by: SURGERY

## 2021-03-18 PROCEDURE — 73130 X-RAY EXAM OF HAND: CPT

## 2021-03-18 PROCEDURE — 1036F TOBACCO NON-USER: CPT | Performed by: SURGERY

## 2021-03-18 NOTE — H&P (VIEW-ONLY)
Leanna AHMADI  Attending, Orthopaedic Surgery  Hand, Wrist, and Elbow Surgery  [facility]      ORTHOPAEDIC HAND, WRIST, AND ELBOW OFFICE  VISIT       ASSESSMENT/PLAN:       78-year-old male here for his  Right thumb CMC joint arthritis and trigger thumb, left carpal tunnel symptoms with possible left index trigger finger  At this time is recommended to be conservatively due to his upcoming surgery  It is not recommended to have any cortisone either injectable or oral   A Comfort Cool brace was provided for the right thumb and a cock-up wrist splint was provided for the left wrist   Instructed to with the wrist brace only at night  An ultrasound of the left wrist to further evaluate for median nerve compression was ordered today  We will follow up with the patient approximately 4 weeks  The patient verbalized understanding of exam findings and treatment plan  We engaged in the shared decision-making process and treatment options were discussed at length with the patient  Surgical and conservative management discussed today along with risks and benefits  Diagnoses and all orders for this visit:    Pain of right thumb  -     XR hand 3+ vw right; Future    Joint pain in both hands  -     Ambulatory referral to Orthopedic Surgery    Arthritis of carpometacarpal [place]) joint of right thumb  -     Thumb Cude comf/Cool    Numbness and tingling in left hand  -     Cock Up Wrist Splint        Follow Up:  Return in about 4 weeks (around 4/15/2021) for bilateral hand pain  To Do Next Visit:  Re-evaluation of current issue      General Discussions:  [address] Arthritis: The anatomy and physiology of carpometacarpal joint arthritis was discussed with the patient today in the office    Deterioration of the articular cartilage eventually leads to hypermobility at the thumb [address] joint, resulting in joint subluxation, osteophyte formation, cystic changes within the trapezium and base of the first metacarpal, as well as subchondral sclerosis  Eventually, pain, limited mobility, and compensatory hyperextension at the metacarpophalangeal joint may develop  While normal activity and usage of the thumb joint may provide a painful experience to the patient, this typically does not result in damage to the thumb or hand  Treatment options include resting thumb spica splints to decreased joint edema, pain, and inflammation  Therapy exercises to strengthen the thenar musculature may relieve pain, but do not alter the overall continued development of osteoarthritis  Oral medications, topical medications, corticosteroid injections may decrease pain and increase overall function  Eventually, approximately 5% of patients may require surgical intervention  Carpal Tunnel Syndrome: The anatomy and physiology of carpal tunnel syndrome was discussed with the patient today  Increase pressure localized under the transverse carpal ligament can cause pain, numbness, tingling, or dysesthesias within the median nerve distribution as well as feelings of fatigue, clumsiness, or awkwardness  These symptoms typically occur at night and worse in the morning upon waking  Eventually, untreated carpal tunnel syndrome can result in weakness and permanent loss of muscle within the thenar compartment of the hand  Treatment options were discussed with the patient  Conservative treatment includes nocturnal resting splints to keep the nerve in a neutral position, ergonomic changes within the work or home environment, activity modification, and tendon gliding exercises  Vitamin B6 one tablet daily over the counter may helpful to reduce symptoms     Steroid injections within the carpal canal can help a majority of patients, however this is often self-limited in a majority of patients  Surgical intervention to divide the transverse carpal ligament typically results in a long-lasting relief of the patient's complaints, with the recurrence rate of less than 1%                                                                                                                                                                                    ____________________________________________________________________________________________________________________________________________      CHIEF COMPLAINT:  Chief Complaint   Patient presents with    Left Hand - Pain    Right Hand - Pain       SUBJECTIVE:  Nito Birmingham is a 64y o  year old RHD male who presents today for consultation for his bilateral hand pain referred by his PCP, Dr Farrah Gutierrez  Patient states that he has left numbness and tingling in the thumb, index and, long fingers  He also has some stiffness in the left index finger  He has a history of a left trigger finger release in 2019 by Dr Kellie Nicole at [facility]  Patient also has pain in the thumb joint along with stiffness  He has a history of a right carpal tunnel release by Dr Kellie Nicole  Patient has an upcoming surgery on 03/25/2021 general surgery for a mass excision on his chest wall         Pain/symptom timing:  Worse during the day when active  Pain/symptom context:  Worse with activites and work  Pain/symptom modifying factors:  Rest makes better, activities make worse  Pain/symptom associated signs/symptoms: none    Prior treatment   · NSAIDsYes   · Injections No   · Bracing/Orthotics Yes    Physical Therapy No     I have personally reviewed all the relevant PMH, PSH, SH, FH, Medications and allergies      PAST MEDICAL HISTORY:  Past Medical History:   Diagnosis Date    Anxiety     GERD (gastroesophageal reflux disease)     Joint pain in both hands 2/19/2021    Kidney stone     Kidney stones 2/19/2021    Mass of anterior abdominal wall 2/19/2021 LLQ 9x6 cm    MI [facility]) [facility] [address] )     Morbid obesity with BMI of 50 0-59 9, adult (HCC)     Nonimmune to hepatitis B virus 3/4/2021    Parotid gland enlargement 2/19/2021    left    Prediabetes 3/4/2021    Sarcoidosis 2/19/2021    Sleep apnea     cpap    Soft tissue mass 2/19/2021    R upper back 9x 11x3cm L upper back 14x20x 4 cm       PAST SURGICAL HISTORY:  Past Surgical History:   Procedure Laterality Date    HAND SURGERY Bilateral 2019    KNEE ARTHROPLASTY         FAMILY HISTORY:  Family History   Problem Relation Age of Onset    Heart disease Mother     Diabetes Mother     Heart disease Father     Cancer Father         blood cancer    Heart disease Paternal Aunt     Heart disease Paternal Uncle     Diabetes Maternal Grandfather     Stroke Neg Hx        SOCIAL HISTORY:  Social History     Tobacco Use    Smoking status: Never Smoker    Smokeless tobacco: Never Used   Substance Use Topics    Alcohol use: No    Drug use: No       MEDICATIONS:    Current Outpatient Medications:     Albuterol Sulfate (VENTOLIN HFA IN), Ventolin NEBU   Refills: 0     Active, Disp: , Rfl:     Ascorbic Acid (vitamin C) 100 MG tablet, Take 100 mg by mouth daily, Disp: , Rfl:     Aspirin (ASPIR-81 PO), Take 81 mg by mouth daily Aspir-81 TBEC  Refills: 0  Active , Disp: , Rfl:     atorvastatin (LIPITOR) 20 mg tablet, Take 20 mg by mouth daily, Disp: , Rfl:     Cholecalciferol (VITAMIN D PO), Take 5,000 Units by mouth daily, Disp: , Rfl:     escitalopram (LEXAPRO) 10 mg tablet, Lexapro 10 MG Oral Tablet  Refills: 0  Active, Disp: , Rfl:     Multiple Vitamins-Minerals (multivitamin with minerals) tablet, Take 1 tablet by mouth daily, Disp: , Rfl:     Omega-3 Fatty Acids (FISH OIL) 1200 MG CPDR, Take 1 capsule by mouth daily , Disp: , Rfl:     pantoprazole (PROTONIX) 40 mg tablet, Take 40 mg by mouth daily, Disp: , Rfl:     pantoprazole (PROTONIX) 40 mg, Take 40 mg by mouth daily Protonix 40 MG Oral Packet  Refills: 0  Active , Disp: , Rfl:     ALLERGIES:  Allergies   Allergen Reactions    Azithromycin Hives    Erythromycin Hives    Erythromycin Base Hives           REVIEW OF SYSTEMS:  Review of Systems   Constitutional: Negative for chills, fever and unexpected weight change  HENT: Negative for hearing loss, nosebleeds and sore throat  Eyes: Negative for pain, redness and visual disturbance  Respiratory: Negative for cough, shortness of breath and wheezing  Cardiovascular: Negative for chest pain, palpitations and leg swelling  Gastrointestinal: Negative for abdominal pain, nausea and vomiting  Endocrine: Negative for polydipsia and polyuria  Genitourinary: Negative for dysuria and hematuria  Skin: Negative for rash and wound  Neurological: Negative for dizziness, light-headedness and headaches  Psychiatric/Behavioral: Negative for decreased concentration, dysphoric mood and suicidal ideas  The patient is not nervous/anxious          VITALS:  Vitals:    03/18/21 0912   BP: 142/81   Pulse: 88       LABS:  HgA1c:   Lab Results   Component Value Date    HGBA1C 6 2 (H) 02/20/2021     BMP:   Lab Results   Component Value Date    CALCIUM 9 4 02/20/2021    K 4 2 02/20/2021    CO2 26 02/20/2021     02/20/2021    BUN 16 02/20/2021    CREATININE 1 19 02/20/2021       _____________________________________________________  PHYSICAL EXAMINATION:  General: well developed and well nourished, alert, oriented times 3 and appears comfortable  Psychiatric: Normal  HEENT: Normocephalic, Atraumatic Trachea Midline, No torticollis  Pulmonary: No audible wheezing or respiratory distress   Cardiovascular: No pitting edema, 2+ radial pulse   Skin: No masses, erythema, lacerations, fluctation, ulcerations  Neurovascular: Sensation intact to the Ulnar Nerve, Sensation Intact to the Radial Nerve, Decreased Sensation to  the Median Nerve, Motor Intact to the Median, Ulnar, Radial Nerve and Pulses Intact  Musculoskeletal: Normal, except as noted in detailed exam and in HPI        MUSCULOSKELETAL EXAMINATION:  Right side:   Well-healed scar over the carpal tunnel  Swelling noted over the thenar eminence  +Tenderness at the thumb [facility] joint  +Grind test  Cannot complete opposition  Full composite fist  APB 4/5 with pain  Palpable nodule at the A1 pulley with crepitation  Limited flexion of the thumb secondary to pain  Sensation intact to light touch  Brisk capillary refill    Left side:    No thenar atrophy noted  Tenderness over the A1 pulley of the index finger  +Tinel's at the wrist  Full composite fist  Opposition intact  Brisk capillary refill      ___________________________________________________  STUDIES REVIEWED:  I have personally reviewed AP lateral and oblique radiographs of Right hand 3 views which demonstrate  No acute fracture dislocation mild degenerative changes of the 1st [facility] joint         PROCEDURES PERFORMED:  Procedures  No Procedures performed today    _____________________________________________________      Jordin Landa    I,:  Fausto Rush am acting as a scribe while in the presence of the attending physician :       I,:  Nayan Russell MD personally performed the services described in this documentation    as scribed in my presence :

## 2021-03-18 NOTE — PATIENT INSTRUCTIONS
Wear the  Left wrist brace only at night  Can wear right thumb brace during the day and with activity

## 2021-03-18 NOTE — PROGRESS NOTES
Yasmine AHMADI  Attending, Orthopaedic Surgery  Hand, Wrist, and Elbow Surgery  [facility]      ORTHOPAEDIC HAND, WRIST, AND ELBOW OFFICE  VISIT       ASSESSMENT/PLAN:       51-year-old male here for his  Right thumb CMC joint arthritis and trigger thumb, left carpal tunnel symptoms with possible left index trigger finger  At this time is recommended to be conservatively due to his upcoming surgery  It is not recommended to have any cortisone either injectable or oral   A Comfort Cool brace was provided for the right thumb and a cock-up wrist splint was provided for the left wrist   Instructed to with the wrist brace only at night  An ultrasound of the left wrist to further evaluate for median nerve compression was ordered today  We will follow up with the patient approximately 4 weeks  The patient verbalized understanding of exam findings and treatment plan  We engaged in the shared decision-making process and treatment options were discussed at length with the patient  Surgical and conservative management discussed today along with risks and benefits  Diagnoses and all orders for this visit:    Pain of right thumb  -     XR hand 3+ vw right; Future    Joint pain in both hands  -     Ambulatory referral to Orthopedic Surgery    Arthritis of carpometacarpal [place]) joint of right thumb  -     Thumb Cude comf/Cool    Numbness and tingling in left hand  -     Cock Up Wrist Splint        Follow Up:  Return in about 4 weeks (around 4/15/2021) for bilateral hand pain  To Do Next Visit:  Re-evaluation of current issue      General Discussions:  [facility] Arthritis: The anatomy and physiology of carpometacarpal joint arthritis was discussed with the patient today in the office    Deterioration of the articular cartilage eventually leads to hypermobility at the thumb [facility] joint, resulting in joint subluxation, osteophyte formation, cystic changes within the trapezium and base of the first metacarpal, as well as subchondral sclerosis  Eventually, pain, limited mobility, and compensatory hyperextension at the metacarpophalangeal joint may develop  While normal activity and usage of the thumb joint may provide a painful experience to the patient, this typically does not result in damage to the thumb or hand  Treatment options include resting thumb spica splints to decreased joint edema, pain, and inflammation  Therapy exercises to strengthen the thenar musculature may relieve pain, but do not alter the overall continued development of osteoarthritis  Oral medications, topical medications, corticosteroid injections may decrease pain and increase overall function  Eventually, approximately 5% of patients may require surgical intervention  Carpal Tunnel Syndrome: The anatomy and physiology of carpal tunnel syndrome was discussed with the patient today  Increase pressure localized under the transverse carpal ligament can cause pain, numbness, tingling, or dysesthesias within the median nerve distribution as well as feelings of fatigue, clumsiness, or awkwardness  These symptoms typically occur at night and worse in the morning upon waking  Eventually, untreated carpal tunnel syndrome can result in weakness and permanent loss of muscle within the thenar compartment of the hand  Treatment options were discussed with the patient  Conservative treatment includes nocturnal resting splints to keep the nerve in a neutral position, ergonomic changes within the work or home environment, activity modification, and tendon gliding exercises  Vitamin B6 one tablet daily over the counter may helpful to reduce symptoms     Steroid injections within the carpal canal can help a majority of patients, however this is often self-limited in a majority of patients  Surgical intervention to divide the transverse carpal ligament typically results in a long-lasting relief of the patient's complaints, with the recurrence rate of less than 1%                                                                                                                                                                                    ____________________________________________________________________________________________________________________________________________      CHIEF COMPLAINT:  Chief Complaint   Patient presents with    Left Hand - Pain    Right Hand - Pain       SUBJECTIVE:  Facundo Howard is a 64y o  year old RHD male who presents today for consultation for his bilateral hand pain referred by his PCP, Dr John Penaloza  Patient states that he has left numbness and tingling in the thumb, index and, long fingers  He also has some stiffness in the left index finger  He has a history of a left trigger finger release in 2019 by Dr Michelle Rocha at [facility]  Patient also has pain in the thumb joint along with stiffness  He has a history of a right carpal tunnel release by Dr Michelle Rocha  Patient has an upcoming surgery on 03/25/2021 general surgery for a mass excision on his chest wall         Pain/symptom timing:  Worse during the day when active  Pain/symptom context:  Worse with activites and work  Pain/symptom modifying factors:  Rest makes better, activities make worse  Pain/symptom associated signs/symptoms: none    Prior treatment   · NSAIDsYes   · Injections No   · Bracing/Orthotics Yes    Physical Therapy No     I have personally reviewed all the relevant PMH, PSH, SH, FH, Medications and allergies      PAST MEDICAL HISTORY:  Past Medical History:   Diagnosis Date    Anxiety     GERD (gastroesophageal reflux disease)     Joint pain in both hands 2/19/2021    Kidney stone     Kidney stones 2/19/2021    Mass of anterior abdominal wall 2/19/2021 LLQ 9x6 cm    MI [facility]myocardial infarction) ([facility] [address] )     Morbid obesity with BMI of 50 0-59 9, adult (HCC)     Nonimmune to hepatitis B virus 3/4/2021    Parotid gland enlargement 2/19/2021    left    Prediabetes 3/4/2021    Sarcoidosis 2/19/2021    Sleep apnea     cpap    Soft tissue mass 2/19/2021    R upper back 9x 11x3cm L upper back 14x20x 4 cm       PAST SURGICAL HISTORY:  Past Surgical History:   Procedure Laterality Date    HAND SURGERY Bilateral 2019    KNEE ARTHROPLASTY         FAMILY HISTORY:  Family History   Problem Relation Age of Onset    Heart disease Mother     Diabetes Mother     Heart disease Father     Cancer Father         blood cancer    Heart disease Paternal Aunt     Heart disease Paternal Uncle     Diabetes Maternal Grandfather     Stroke Neg Hx        SOCIAL HISTORY:  Social History     Tobacco Use    Smoking status: Never Smoker    Smokeless tobacco: Never Used   Substance Use Topics    Alcohol use: No    Drug use: No       MEDICATIONS:    Current Outpatient Medications:     Albuterol Sulfate (VENTOLIN HFA IN), Ventolin NEBU   Refills: 0     Active, Disp: , Rfl:     Ascorbic Acid (vitamin C) 100 MG tablet, Take 100 mg by mouth daily, Disp: , Rfl:     Aspirin (ASPIR-81 PO), Take 81 mg by mouth daily Aspir-81 TBEC  Refills: 0  Active , Disp: , Rfl:     atorvastatin (LIPITOR) 20 mg tablet, Take 20 mg by mouth daily, Disp: , Rfl:     Cholecalciferol (VITAMIN D PO), Take 5,000 Units by mouth daily, Disp: , Rfl:     escitalopram (LEXAPRO) 10 mg tablet, Lexapro 10 MG Oral Tablet  Refills: 0  Active, Disp: , Rfl:     Multiple Vitamins-Minerals (multivitamin with minerals) tablet, Take 1 tablet by mouth daily, Disp: , Rfl:     Omega-3 Fatty Acids (FISH OIL) 1200 MG CPDR, Take 1 capsule by mouth daily , Disp: , Rfl:     pantoprazole (PROTONIX) 40 mg tablet, Take 40 mg by mouth daily, Disp: , Rfl:     pantoprazole (PROTONIX) 40 mg, Take 40 mg by mouth daily Protonix 40 MG Oral Packet  Refills: 0  Active , Disp: , Rfl:     ALLERGIES:  Allergies   Allergen Reactions    Azithromycin Hives    Erythromycin Hives    Erythromycin Base Hives           REVIEW OF SYSTEMS:  Review of Systems   Constitutional: Negative for chills, fever and unexpected weight change  HENT: Negative for hearing loss, nosebleeds and sore throat  Eyes: Negative for pain, redness and visual disturbance  Respiratory: Negative for cough, shortness of breath and wheezing  Cardiovascular: Negative for chest pain, palpitations and leg swelling  Gastrointestinal: Negative for abdominal pain, nausea and vomiting  Endocrine: Negative for polydipsia and polyuria  Genitourinary: Negative for dysuria and hematuria  Skin: Negative for rash and wound  Neurological: Negative for dizziness, light-headedness and headaches  Psychiatric/Behavioral: Negative for decreased concentration, dysphoric mood and suicidal ideas  The patient is not nervous/anxious          VITALS:  Vitals:    03/18/21 0912   BP: 142/81   Pulse: 88       LABS:  HgA1c:   Lab Results   Component Value Date    HGBA1C 6 2 (H) 02/20/2021     BMP:   Lab Results   Component Value Date    CALCIUM 9 4 02/20/2021    K 4 2 02/20/2021    CO2 26 02/20/2021     02/20/2021    BUN 16 02/20/2021    CREATININE 1 19 02/20/2021       _____________________________________________________  PHYSICAL EXAMINATION:  General: well developed and well nourished, alert, oriented times 3 and appears comfortable  Psychiatric: Normal  HEENT: Normocephalic, Atraumatic Trachea Midline, No torticollis  Pulmonary: No audible wheezing or respiratory distress   Cardiovascular: No pitting edema, 2+ radial pulse   Skin: No masses, erythema, lacerations, fluctation, ulcerations  Neurovascular: Sensation intact to the Ulnar Nerve, Sensation Intact to the Radial Nerve, Decreased Sensation to  the Median Nerve, Motor Intact to the Median, Ulnar, Radial Nerve and Pulses Intact  Musculoskeletal: Normal, except as noted in detailed exam and in HPI        MUSCULOSKELETAL EXAMINATION:  Right side:   Well-healed scar over the carpal tunnel  Swelling noted over the thenar eminence  +Tenderness at the thumb [facility] joint  +Grind test  Cannot complete opposition  Full composite fist  APB 4/5 with pain  Palpable nodule at the A1 pulley with crepitation  Limited flexion of the thumb secondary to pain  Sensation intact to light touch  Brisk capillary refill    Left side:    No thenar atrophy noted  Tenderness over the A1 pulley of the index finger  +Tinel's at the wrist  Full composite fist  Opposition intact  Brisk capillary refill      ___________________________________________________  STUDIES REVIEWED:  I have personally reviewed AP lateral and oblique radiographs of Right hand 3 views which demonstrate  No acute fracture dislocation mild degenerative changes of the 1st [facility] joint         PROCEDURES PERFORMED:  Procedures  No Procedures performed today    _____________________________________________________      [facility]    I,:  Daniele Montana am acting as a scribe while in the presence of the attending physician :       I,:  Meghan Gasca MD personally performed the services described in this documentation    as scribed in my presence :

## 2021-03-22 NOTE — PRE-PROCEDURE INSTRUCTIONS
Pre-Surgery Instructions:   Medication Instructions    Ascorbic Acid (vitamin C) 100 MG tablet pt instructed to not take on day of surgery    Aspirin (ASPIR-81 PO) pt instructed to stop prior to surgery    atorvastatin (LIPITOR) 20 mg tablet pt instsructed to take on day of surgery with 1-2 sips of water    Cholecalciferol (VITAMIN D PO) pt instructed to not take on day of surgery    escitalopram (LEXAPRO) 10 mg tablet pt instructed to take on day of surgery with 1-2 sips of water    Multiple Vitamins-Minerals (multivitamin with minerals) tablet pt instructed to stop prior to surgery    pantoprazole (PROTONIX) 40 mg pt instructed to take on day of surgery with 1-2 sips of water    Pt denies fever, sob, sore throat and cough  Pt verbalized understanding of shower and [place] visitor instructions  Pt instructed to stop nsaids and supplements prior to surgery    Pt would like to continue vit c and d

## 2021-03-23 ENCOUNTER — ANESTHESIA EVENT (OUTPATIENT)
Dept: PERIOP | Facility: HOSPITAL | Age: 56
End: 2021-03-23
Payer: COMMERCIAL

## 2021-04-06 ENCOUNTER — TELEPHONE (OUTPATIENT)
Dept: SURGERY | Facility: CLINIC | Age: 56
End: 2021-04-06

## 2021-04-08 ENCOUNTER — ANESTHESIA (OUTPATIENT)
Dept: PERIOP | Facility: HOSPITAL | Age: 56
End: 2021-04-08
Payer: COMMERCIAL

## 2021-04-08 ENCOUNTER — HOSPITAL ENCOUNTER (OUTPATIENT)
Facility: HOSPITAL | Age: 56
Setting detail: OUTPATIENT SURGERY
Discharge: HOME | End: 2021-04-08
Attending: SURGERY | Admitting: SURGERY
Payer: COMMERCIAL

## 2021-04-08 VITALS
OXYGEN SATURATION: 97 % | DIASTOLIC BLOOD PRESSURE: 62 MMHG | TEMPERATURE: 97.2 F | HEIGHT: 63 IN | HEART RATE: 65 BPM | BODY MASS INDEX: 49.96 KG/M2 | WEIGHT: 282 LBS | SYSTOLIC BLOOD PRESSURE: 132 MMHG | RESPIRATION RATE: 15 BRPM

## 2021-04-08 DIAGNOSIS — K65.4 FAT NECROSIS OF ABDOMINAL WALL (HCC): Primary | ICD-10-CM

## 2021-04-08 DIAGNOSIS — R22.2 CHEST WALL MASS: ICD-10-CM

## 2021-04-08 PROCEDURE — 21933 EXC BACK TUM DEEP 5 CM/>: CPT | Performed by: PHYSICIAN ASSISTANT

## 2021-04-08 PROCEDURE — 21933 EXC BACK TUM DEEP 5 CM/>: CPT | Performed by: SURGERY

## 2021-04-08 PROCEDURE — 88304 TISSUE EXAM BY PATHOLOGIST: CPT | Performed by: PATHOLOGY

## 2021-04-08 RX ORDER — SODIUM CHLORIDE, SODIUM LACTATE, POTASSIUM CHLORIDE, CALCIUM CHLORIDE 600; 310; 30; 20 MG/100ML; MG/100ML; MG/100ML; MG/100ML
125 INJECTION, SOLUTION INTRAVENOUS CONTINUOUS
Status: DISCONTINUED | OUTPATIENT
Start: 2021-04-08 | End: 2021-04-08 | Stop reason: HOSPADM

## 2021-04-08 RX ORDER — ONDANSETRON 2 MG/ML
INJECTION INTRAMUSCULAR; INTRAVENOUS AS NEEDED
Status: DISCONTINUED | OUTPATIENT
Start: 2021-04-08 | End: 2021-04-08

## 2021-04-08 RX ORDER — FENTANYL CITRATE/PF 50 MCG/ML
50 SYRINGE (ML) INJECTION
Status: DISCONTINUED | OUTPATIENT
Start: 2021-04-08 | End: 2021-04-08 | Stop reason: HOSPADM

## 2021-04-08 RX ORDER — DIPHENHYDRAMINE HYDROCHLORIDE 50 MG/ML
12.5 INJECTION INTRAMUSCULAR; INTRAVENOUS ONCE
Status: COMPLETED | OUTPATIENT
Start: 2021-04-08 | End: 2021-04-08

## 2021-04-08 RX ORDER — MIDAZOLAM HYDROCHLORIDE 2 MG/2ML
INJECTION, SOLUTION INTRAMUSCULAR; INTRAVENOUS AS NEEDED
Status: DISCONTINUED | OUTPATIENT
Start: 2021-04-08 | End: 2021-04-08

## 2021-04-08 RX ORDER — GLYCOPYRROLATE 0.2 MG/ML
INJECTION INTRAMUSCULAR; INTRAVENOUS AS NEEDED
Status: DISCONTINUED | OUTPATIENT
Start: 2021-04-08 | End: 2021-04-08

## 2021-04-08 RX ORDER — EPHEDRINE SULFATE 50 MG/ML
INJECTION INTRAVENOUS AS NEEDED
Status: DISCONTINUED | OUTPATIENT
Start: 2021-04-08 | End: 2021-04-08

## 2021-04-08 RX ORDER — SODIUM CHLORIDE, SODIUM LACTATE, POTASSIUM CHLORIDE, CALCIUM CHLORIDE 600; 310; 30; 20 MG/100ML; MG/100ML; MG/100ML; MG/100ML
INJECTION, SOLUTION INTRAVENOUS CONTINUOUS PRN
Status: DISCONTINUED | OUTPATIENT
Start: 2021-04-08 | End: 2021-04-08

## 2021-04-08 RX ORDER — ONDANSETRON 2 MG/ML
4 INJECTION INTRAMUSCULAR; INTRAVENOUS ONCE
Status: COMPLETED | OUTPATIENT
Start: 2021-04-08 | End: 2021-04-08

## 2021-04-08 RX ORDER — BUPIVACAINE HYDROCHLORIDE AND EPINEPHRINE 2.5; 5 MG/ML; UG/ML
INJECTION, SOLUTION EPIDURAL; INFILTRATION; INTRACAUDAL; PERINEURAL AS NEEDED
Status: DISCONTINUED | OUTPATIENT
Start: 2021-04-08 | End: 2021-04-08 | Stop reason: HOSPADM

## 2021-04-08 RX ORDER — ASPIRIN 81 MG/1
81 TABLET ORAL DAILY
Refills: 0
Start: 2021-04-08 | End: 2021-09-29

## 2021-04-08 RX ORDER — LIDOCAINE HYDROCHLORIDE 10 MG/ML
INJECTION, SOLUTION EPIDURAL; INFILTRATION; INTRACAUDAL; PERINEURAL AS NEEDED
Status: DISCONTINUED | OUTPATIENT
Start: 2021-04-08 | End: 2021-04-08

## 2021-04-08 RX ORDER — FENTANYL CITRATE 50 UG/ML
INJECTION, SOLUTION INTRAMUSCULAR; INTRAVENOUS AS NEEDED
Status: DISCONTINUED | OUTPATIENT
Start: 2021-04-08 | End: 2021-04-08

## 2021-04-08 RX ORDER — DEXAMETHASONE SODIUM PHOSPHATE 10 MG/ML
INJECTION, SOLUTION INTRAMUSCULAR; INTRAVENOUS AS NEEDED
Status: DISCONTINUED | OUTPATIENT
Start: 2021-04-08 | End: 2021-04-08

## 2021-04-08 RX ORDER — CEFAZOLIN SODIUM 2 G/50ML
2000 SOLUTION INTRAVENOUS ONCE
Status: DISCONTINUED | OUTPATIENT
Start: 2021-04-08 | End: 2021-04-08 | Stop reason: HOSPADM

## 2021-04-08 RX ORDER — PROPOFOL 10 MG/ML
INJECTION, EMULSION INTRAVENOUS AS NEEDED
Status: DISCONTINUED | OUTPATIENT
Start: 2021-04-08 | End: 2021-04-08

## 2021-04-08 RX ORDER — SUCCINYLCHOLINE/SOD CL,ISO/PF 100 MG/5ML
SYRINGE (ML) INTRAVENOUS AS NEEDED
Status: DISCONTINUED | OUTPATIENT
Start: 2021-04-08 | End: 2021-04-08

## 2021-04-08 RX ORDER — OXYCODONE HYDROCHLORIDE AND ACETAMINOPHEN 5; 325 MG/1; MG/1
1 TABLET ORAL EVERY 4 HOURS PRN
Qty: 20 TABLET | Refills: 0 | Status: SHIPPED | OUTPATIENT
Start: 2021-04-08 | End: 2021-06-24

## 2021-04-08 RX ORDER — METOCLOPRAMIDE HYDROCHLORIDE 5 MG/ML
INJECTION INTRAMUSCULAR; INTRAVENOUS AS NEEDED
Status: DISCONTINUED | OUTPATIENT
Start: 2021-04-08 | End: 2021-04-08

## 2021-04-08 RX ORDER — HYDROMORPHONE HCL/PF 1 MG/ML
0.5 SYRINGE (ML) INJECTION
Status: DISCONTINUED | OUTPATIENT
Start: 2021-04-08 | End: 2021-04-08 | Stop reason: HOSPADM

## 2021-04-08 RX ORDER — DIPHENHYDRAMINE HYDROCHLORIDE 50 MG/ML
25 INJECTION INTRAMUSCULAR; INTRAVENOUS EVERY 6 HOURS PRN
Status: CANCELLED | OUTPATIENT
Start: 2021-04-08

## 2021-04-08 RX ORDER — PROMETHAZINE HYDROCHLORIDE 25 MG/ML
25 INJECTION, SOLUTION INTRAMUSCULAR; INTRAVENOUS EVERY 6 HOURS PRN
Status: DISCONTINUED | OUTPATIENT
Start: 2021-04-08 | End: 2021-04-08 | Stop reason: HOSPADM

## 2021-04-08 RX ORDER — ROCURONIUM BROMIDE 10 MG/ML
INJECTION, SOLUTION INTRAVENOUS AS NEEDED
Status: DISCONTINUED | OUTPATIENT
Start: 2021-04-08 | End: 2021-04-08

## 2021-04-08 RX ADMIN — PHENYLEPHRINE HYDROCHLORIDE 200 MCG: 10 INJECTION INTRAVENOUS at 10:49

## 2021-04-08 RX ADMIN — PROPOFOL 50 MG: 10 INJECTION, EMULSION INTRAVENOUS at 11:07

## 2021-04-08 RX ADMIN — ONDANSETRON 4 MG: 2 INJECTION INTRAMUSCULAR; INTRAVENOUS at 12:20

## 2021-04-08 RX ADMIN — Medication 3000 MG: at 10:20

## 2021-04-08 RX ADMIN — SUGAMMADEX 400 MG: 100 INJECTION, SOLUTION INTRAVENOUS at 11:42

## 2021-04-08 RX ADMIN — PHENYLEPHRINE HYDROCHLORIDE 200 MCG: 10 INJECTION INTRAVENOUS at 11:10

## 2021-04-08 RX ADMIN — PHENYLEPHRINE HYDROCHLORIDE 200 MCG: 10 INJECTION INTRAVENOUS at 11:30

## 2021-04-08 RX ADMIN — ONDANSETRON 4 MG: 2 INJECTION INTRAMUSCULAR; INTRAVENOUS at 10:15

## 2021-04-08 RX ADMIN — SODIUM CHLORIDE, SODIUM LACTATE, POTASSIUM CHLORIDE, AND CALCIUM CHLORIDE: .6; .31; .03; .02 INJECTION, SOLUTION INTRAVENOUS at 12:04

## 2021-04-08 RX ADMIN — PROPOFOL 250 MG: 10 INJECTION, EMULSION INTRAVENOUS at 10:11

## 2021-04-08 RX ADMIN — PHENYLEPHRINE HYDROCHLORIDE 200 MCG: 10 INJECTION INTRAVENOUS at 11:19

## 2021-04-08 RX ADMIN — GLYCOPYRROLATE 0.2 MG: 0.2 INJECTION, SOLUTION INTRAMUSCULAR; INTRAVENOUS at 10:05

## 2021-04-08 RX ADMIN — METOCLOPRAMIDE HYDROCHLORIDE 10 MG: 5 INJECTION INTRAMUSCULAR; INTRAVENOUS at 10:00

## 2021-04-08 RX ADMIN — DIPHENHYDRAMINE HYDROCHLORIDE: 50 INJECTION, SOLUTION INTRAMUSCULAR; INTRAVENOUS at 13:30

## 2021-04-08 RX ADMIN — ROCURONIUM BROMIDE 20 MG: 10 INJECTION, SOLUTION INTRAVENOUS at 11:09

## 2021-04-08 RX ADMIN — FENTANYL CITRATE 100 MCG: 50 INJECTION, SOLUTION INTRAMUSCULAR; INTRAVENOUS at 10:10

## 2021-04-08 RX ADMIN — DEXAMETHASONE SODIUM PHOSPHATE 10 MG: 10 INJECTION, SOLUTION INTRAMUSCULAR; INTRAVENOUS at 10:15

## 2021-04-08 RX ADMIN — SODIUM CHLORIDE, SODIUM LACTATE, POTASSIUM CHLORIDE, AND CALCIUM CHLORIDE: .6; .31; .03; .02 INJECTION, SOLUTION INTRAVENOUS at 10:05

## 2021-04-08 RX ADMIN — FENTANYL CITRATE 50 MCG: 50 INJECTION, SOLUTION INTRAMUSCULAR; INTRAVENOUS at 10:33

## 2021-04-08 RX ADMIN — Medication 150 MG: at 10:12

## 2021-04-08 RX ADMIN — LIDOCAINE HYDROCHLORIDE 50 MG: 10 INJECTION, SOLUTION EPIDURAL; INFILTRATION; INTRACAUDAL; PERINEURAL at 10:11

## 2021-04-08 RX ADMIN — EPHEDRINE SULFATE 10 MG: 50 INJECTION, SOLUTION INTRAVENOUS at 11:35

## 2021-04-08 RX ADMIN — FENTANYL CITRATE 50 MCG: 50 INJECTION, SOLUTION INTRAMUSCULAR; INTRAVENOUS at 11:10

## 2021-04-08 RX ADMIN — MIDAZOLAM HYDROCHLORIDE 2 MG: 1 INJECTION, SOLUTION INTRAMUSCULAR; INTRAVENOUS at 10:05

## 2021-04-08 NOTE — ANESTHESIA POSTPROCEDURE EVALUATION
Post-Op Assessment Note    CV Status:  Stable  Pain Score: 0    Pain management: adequate     Mental Status:  Alert   Hydration Status:  Stable   PONV Controlled:  None   Airway Patency:  Patent  Airway: intubated   Two or more mitigation strategies used for obstructive sleep apnea   Post Op Vitals Reviewed: Yes      Staff: Anesthesiologist         No complications documented      BP   116/65   Temp 37   Pulse  73   Resp   18   SpO2   99

## 2021-04-08 NOTE — DISCHARGE INSTRUCTIONS
-You make take over the counter Tylenol and ibuprofen as needed   -Take prescribed pain medicine only as needed for moderate-severe pain  -Monitor drainage output  Drain may be taken out when there is <25 cc per day (Call office when drainage output is at this point for removal)  -Hold off on taking your aspirin for 72 hours   MAY RESTART DAILY ASPIRIN on: Sunday 4/11  -Follow up in 1 week with Dr Hubbard for suture removal

## 2021-04-08 NOTE — ANESTHESIA PREPROCEDURE EVALUATION
Procedure:  excision large mass back (N/A Back)    Relevant Problems   CARDIO   (+) Hyperlipidemia      GI/HEPATIC   (+) GERD (gastroesophageal reflux disease)      /RENAL   (+) Kidney stones      MUSCULOSKELETAL   (+) Osteoarthritis of right knee      NEURO/PSYCH   (+) Anxiety      PULMONARY   (+) Sleep apnea        Physical Exam    Airway      TM Distance: >3 FB  Neck ROM: full     Dental       Cardiovascular  Rhythm: regular, Rate: normal, Cardiovascular exam normal    Pulmonary  Pulmonary exam normal Breath sounds clear to auscultation,     Other Findings        Anesthesia Plan  ASA Score- 3     Anesthesia Type- general with ASA Monitors  Additional Monitors:   Airway Plan: ETT  Plan Factors-Exercise tolerance (METS): >4 METS  Chart reviewed  Patient instructed to abstain from smoking on day of procedure  Patient did not smoke on day of surgery  Obstructive sleep apnea risk education given perioperatively  Induction- intravenous  Postoperative Plan- Plan for postoperative opioid use  Planned trial extubation    Informed Consent- Anesthetic plan and risks discussed with patient  I personally reviewed this patient with the CRNA  Discussed and agreed on the Anesthesia Plan with the CRNA  Sam Choudhary

## 2021-04-08 NOTE — INTERVAL H&P NOTE
H&P reviewed  After examining the patient I find no changes in the patients condition since the H&P had been written      Vitals:    04/08/21 0947   BP: (!) 171/80   Pulse: 90   Resp: 20   Temp: 98 6 °F (37 °C)   SpO2: 97%

## 2021-04-08 NOTE — OP NOTE
OPERATIVE REPORT  PATIENT NAME: Satnam Partida    :  1965  MRN: [id number]  Pt Location: [facility] OR ROOM 01    SURGERY DATE: 2021    Surgeon(s) and Role: Hafsa Latif MD - Primary     * Frieda Layne PA-C - Assisting    Preop Diagnosis:  Mass upper back [R22 2]    Post-Op Diagnosis Codes:  Same [R22 2]    Procedure(s) (LRB):  excision large mass back subfascial  25x 13x 3 (N/A)  CPT 50310    Specimen(s):  ID Type Source Tests Collected by Time Destination   1 : back mass Tissue Soft Tissue, Other TISSUE EXAM Shorty Cooper MD 2021 10:43 AM        Estimated Blood Loss:   Minimal    Drains:  Closed/Suction Drain Left;Dorsal Back 10 Fr  (Active)   Site Description Other (Comment) 21 111   Dressing Status Clean 21 1115   Number of days: 0       Anesthesia Type:   General    Operative Indications:  Back mass 25 cm x 13 cm     Operative Findings:  Large mass of the back upper part it was subfascial it measured 25 cm by 13 cm x 3 cm  Complications:   None    Procedure and Technique:  The patient was brought to the operating room and identified correctly by myself and the operating room staff  Patient was given general anesthesia  He was placed in prone position  The parts were prepped and draped in the standard fashion  A time-out was then performed  A transverse incision was made in the longest axis of the mass  The incision was deepened using electrocautery through the subcutaneous tissue  We had to open the fascia as it was underneath the fascia  The mass was dissected by raising the superior as well as inferior skin and subcutaneous flaps  The incision had to be carried across the midline to get to the other side of the mass  The mass was adhered to the fascia over the muscle and it was dissected and removed using electrocautery  The supplying blood vessels were ligated and tied using 2-0 Vicryl ties    S was the mass was removed the irrigation of the cavity was performed  Electro cautery was used to ensure hemostasis  Size 10 Hemovac drain was then placed in the cavity  Two Vicryl was used interrupted fashion to close the fascia  The skin was then closed using 3-0 nylon in vertical mattress fashion  The drain was of fixed using 3-0 nylon  A sterile dressing was then performed  A drain dressing was also placed  Patient was placed in supine position, extubated and was taken to the recovery under stable condition     I was present for the entire procedure, A qualified resident physician was not available and A physician assistant was required during the procedure for retraction tissue handling,dissection and suturing    Patient Disposition:  PACU     SIGNATURE: Wood Richards MD  DATE: April 8, 2021  TIME: 11:55 AM

## 2021-04-12 ENCOUNTER — TELEPHONE (OUTPATIENT)
Dept: PULMONOLOGY | Facility: CLINIC | Age: 56
End: 2021-04-12

## 2021-04-12 ENCOUNTER — HOSPITAL ENCOUNTER (EMERGENCY)
Facility: HOSPITAL | Age: 56
Discharge: HOME | End: 2021-04-12
Attending: EMERGENCY MEDICINE | Admitting: EMERGENCY MEDICINE
Payer: COMMERCIAL

## 2021-04-12 VITALS
HEART RATE: 76 BPM | TEMPERATURE: 98.7 F | OXYGEN SATURATION: 98 % | HEIGHT: 63 IN | SYSTOLIC BLOOD PRESSURE: 165 MMHG | RESPIRATION RATE: 20 BRPM | WEIGHT: 285 LBS | BODY MASS INDEX: 50.5 KG/M2 | DIASTOLIC BLOOD PRESSURE: 90 MMHG

## 2021-04-12 DIAGNOSIS — Z51.89 ENCOUNTER FOR WOUND CARE: Primary | ICD-10-CM

## 2021-04-12 PROCEDURE — 99281 EMR DPT VST MAYX REQ PHY/QHP: CPT

## 2021-04-12 PROCEDURE — 99284 EMERGENCY DEPT VISIT MOD MDM: CPT | Performed by: EMERGENCY MEDICINE

## 2021-04-12 NOTE — ED PROVIDER NOTES
History  No chief complaint on file  HPI   63 yo M presenting for wound check  Patient had a soft tissue large mass 25 x 13 x 3 cm on left upper back  4/8 patient had a lipoma removed by Dr Alma Espinoza here at [facility] with surgeon on Wednesday  Patient was to remove the dressing today, and they were unsure which dressing to remove  No fevers  Otherwise well has no complaints  For 24 hours, output from drain < 2 oz  Prior to Admission Medications   Prescriptions Last Dose Informant Patient Reported? Taking?    Ascorbic Acid (vitamin C) 100 MG tablet   Yes No   Sig: Take 100 mg by mouth daily   Cholecalciferol (VITAMIN D PO)  Self Yes No   Sig: Take 10,000 Units by mouth every other day    Multiple Vitamins-Minerals (multivitamin with minerals) tablet   Yes No   Sig: Take 1 tablet by mouth daily   aspirin (Aspirin 81) 81 mg EC tablet   No No   Sig: Take 1 tablet (81 mg total) by mouth daily You may restart your daily aspirin on Sunday April 11th    atorvastatin (LIPITOR) 20 mg tablet  Self Yes No   Sig: Take 20 mg by mouth daily   escitalopram (LEXAPRO) 10 mg tablet  Self Yes No   Sig: daily    oxyCODONE-acetaminophen (PERCOCET) 5-325 mg per tablet   No No   Sig: Take 1 tablet by mouth every 4 (four) hours as needed for moderate pain for up to 20 dosesMax Daily Amount: 6 tablets   pantoprazole (PROTONIX) 40 mg  Self Yes No   Sig: Take 40 mg by mouth daily Protonix 40 MG Oral Packet  Refills: 0  Active       Facility-Administered Medications: None       Past Medical History:   Diagnosis Date    Anxiety     GERD (gastroesophageal reflux disease)     Joint pain in both hands 2/19/2021    Kidney stone     Kidney stones 2/19/2021    Mass of anterior abdominal wall 2/19/2021    LLQ 9x6 cm    [facility] infarction[facility]     Morbid obesity with BMI of 50 0-59 9, adult ([facility] )     Nonimmune to hepatitis B virus 3/4/2021    Parotid gland enlargement 2/19/2021    left    Prediabetes 3/4/2021    Sarcoidosis 2/19/2021    Sleep apnea     cpap    Soft tissue mass 2/19/2021    R upper back 9x 11x3cm L upper back 14x20x 4 cm       Past Surgical History:   Procedure Laterality Date    BRONCHOSCOPY      COLONOSCOPY      EGD      HAND SURGERY Bilateral 2019    trigger finger and carpal tunnel     KNEE ARTHROSCOPY      meniscus surgery    PA EXCISION TUMOR SOFT TISSUE BACK/FLANK SUBQ 3+CM N/A 4/8/2021    Procedure: excision large mass back subfascial  25x 13x 3;  Surgeon: Darrell Whalen MD;  Location:  MAIN OR;  Service: General    VASECTOMY         Family History   Problem Relation Age of Onset    Heart disease Mother     Diabetes Mother     Heart disease Father     Cancer Father         blood cancer    Heart disease Paternal Aunt     Heart disease Paternal Uncle     Diabetes Maternal Grandfather     Stroke Neg Hx      I have reviewed and agree with the history as documented  E-Cigarette/Vaping    E-Cigarette Use Never User      E-Cigarette/Vaping Substances    Nicotine No     THC No     CBD No     Flavoring No     Other No     Unknown No      Social History     Tobacco Use    Smoking status: Never Smoker    Smokeless tobacco: Never Used   Substance Use Topics    Alcohol use: No    Drug use: No       Review of Systems   Constitutional: Negative for chills, fatigue and fever  HENT: Negative for nosebleeds and sore throat  Eyes: Negative for redness and visual disturbance  Respiratory: Negative for shortness of breath and wheezing  Cardiovascular: Negative for chest pain and palpitations  Gastrointestinal: Negative for abdominal pain and diarrhea  Endocrine: Negative for polyuria  Genitourinary: Negative for difficulty urinating and testicular pain  Musculoskeletal: Negative for back pain and neck stiffness  Skin: Negative for rash and wound  Neurological: Negative for seizures, speech difficulty and headaches     Psychiatric/Behavioral: Negative for dysphoric mood and hallucinations  All other systems reviewed and are negative  Physical Exam  Physical Exam  Vitals signs and nursing note reviewed  Constitutional:       Appearance: He is well-developed  HENT:      Head: Normocephalic and atraumatic  Right Ear: External ear normal       Left Ear: External ear normal    Eyes:      Conjunctiva/sclera: Conjunctivae normal    Neck:      Musculoskeletal: Normal range of motion  Cardiovascular:      Rate and Rhythm: Normal rate and regular rhythm  Heart sounds: Normal heart sounds  Pulmonary:      Effort: Pulmonary effort is normal       Breath sounds: Normal breath sounds  No wheezing  Chest:      Chest wall: No tenderness  Abdominal:      General: Bowel sounds are normal       Palpations: Abdomen is soft  Tenderness: There is no abdominal tenderness  There is no guarding  Musculoskeletal: Normal range of motion  Skin:     General: Skin is warm and dry  Findings: No rash  Comments: Surgical Wound on back 7 cm  C/d/i  javier drain with minimal fluid, blood tinged,  Site also c/d/i   Neurological:      Mental Status: He is alert and oriented to person, place, and time  Cranial Nerves: No cranial nerve deficit  Sensory: No sensory deficit  Motor: No abnormal muscle tone        Coordination: Coordination normal          Vital Signs  ED Triage Vitals [04/12/21 1755]   Temperature Pulse Respirations Blood Pressure SpO2   98 7 °F (37 1 °C) 76 20 165/90 98 %      Temp src Heart Rate Source Patient Position - Orthostatic VS BP Location FiO2 (%)   -- -- -- -- --      Pain Score       --           Vitals:    04/12/21 1755   BP: 165/90   Pulse: 76         Visual Acuity      ED Medications  Medications - No data to display    Diagnostic Studies  Results Reviewed     None                 No orders to display              Procedures  Procedures         ED Course               MDM     63-year-old male presents to ED for evaluation of a surgical wound evaluation for dressing change  The patient was unsure how to change dressing and remove it  He was told on his paperwork to remove the dressing and apply ointment to the surgical scar today  This was done here, patient discharged with surgery follow-up  The patient was instructed to follow up as documented  Strict return precautions were discussed with the patient and the patient was instructed to return to the emergency department immediately if symptoms worsen  The patient/patient family member acknowledged and were in agreement with plan  Disposition  Final diagnoses:   Encounter for wound care     Time reflects when diagnosis was documented in both MDM as applicable and the Disposition within this note     Time User Action Codes Description Comment    4/12/2021  6:15 PM Lupillo Coyle, [address] [[id number]] Encounter for wound care       ED Disposition     ED Disposition Condition Date/Time Comment    Discharge Stable Mon Apr 12, 2021  6:14 PM Amy Nolan discharge to home/self care              Follow-up Information     Follow up With Specialties Details Why Contact Info    Jose García MD General Surgery Go in 2 days For follow up regarding your symptoms and recheck [address] [facility]  [phone]            Discharge Medication List as of 4/12/2021  6:15 PM      CONTINUE these medications which have NOT CHANGED    Details   Ascorbic Acid (vitamin C) 100 MG tablet Take 100 mg by mouth daily, Historical Med      aspirin (Aspirin 81) 81 mg EC tablet Take 1 tablet (81 mg total) by mouth daily You may restart your daily aspirin on Sunday April 11th , Starting Thu 4/8/2021, No Print      atorvastatin (LIPITOR) 20 mg tablet Take 20 mg by mouth daily, Starting Mon 3/23/2020, Historical Med      Cholecalciferol (VITAMIN D PO) Take 10,000 Units by mouth every other day , Historical Med      escitalopram (LEXAPRO) 10 mg tablet daily , Historical Med      Multiple Vitamins-Minerals (multivitamin with minerals) tablet Take 1 tablet by mouth daily, Historical Med      oxyCODONE-acetaminophen (PERCOCET) 5-325 mg per tablet Take 1 tablet by mouth every 4 (four) hours as needed for moderate pain for up to 20 dosesMax Daily Amount: 6 tablets, Starting Thu 4/8/2021, Normal      pantoprazole (PROTONIX) 40 mg Take 40 mg by mouth daily Protonix 40 MG Oral Packet  Refills: 0  Active , Historical Med           No discharge procedures on file      PDMP Review     None          ED Provider  Electronically Signed by           Marquis Haven MD  04/13/21 5655

## 2021-04-12 NOTE — TELEPHONE ENCOUNTER
Advised patient to get dressing change done today per Dr Julai Goncalves advice  Patient stated he does not feel comfortable doing so, and asked if the dressing change can wait until Wednesday when he has his appointment with Dr Noreen Christianson patient it is not advisable, and he asked if I would advise having him go to ED  I advised patient that this is not classified as an emergency, and recommended that he go to an Urgent Care  Directed patient [facility] [place] [facility]

## 2021-04-14 ENCOUNTER — OFFICE VISIT (OUTPATIENT)
Dept: SURGERY | Facility: CLINIC | Age: 56
End: 2021-04-14

## 2021-04-14 VITALS
HEART RATE: 75 BPM | SYSTOLIC BLOOD PRESSURE: 132 MMHG | WEIGHT: 289 LBS | HEIGHT: 63 IN | DIASTOLIC BLOOD PRESSURE: 80 MMHG | RESPIRATION RATE: 18 BRPM | BODY MASS INDEX: 51.21 KG/M2 | TEMPERATURE: 98 F

## 2021-04-14 DIAGNOSIS — Z48.89 POSTOPERATIVE VISIT: ICD-10-CM

## 2021-04-14 DIAGNOSIS — M79.89 SOFT TISSUE MASS: Primary | ICD-10-CM

## 2021-04-14 PROCEDURE — 99024 POSTOP FOLLOW-UP VISIT: CPT | Performed by: SURGERY

## 2021-04-14 NOTE — PROGRESS NOTES
Assessment/Plan:  I removed the drain  I told him that there may be some drainage from the drain site for next 2-3 days  I changed the dressing  I am going to see him next week for suture removal   He should call me in case there is any problems   Like excessive drainage, fever, excessive pain  No problem-specific Assessment & Plan notes found for this encounter  Diagnoses and all orders for this visit:    Soft tissue mass    Postoperative visit          Subjective:      Patient ID: Noemi Solorio is a 64 y o  male  66-year-old male patient who is 6 days status post removal of a large mass from his upper back  He is here today for drain removal   He tells me that he has no pain  No fever  No drainage from the main wound  The drain is draining less than 10 cc for the last 24 hours  The following portions of the patient's history were reviewed and updated as appropriate: allergies, current medications, past medical history, past social history, past surgical history and problem list     Review of Systems   All other systems reviewed and are negative  Objective:      /80 (BP Location: Right arm, Patient Position: Sitting, Cuff Size: Adult)   Pulse 75   Temp 98 °F (36 7 °C)   Resp 18   Ht 5' 3" (1 6 m)   Wt 131 kg (289 lb)   BMI 51 19 kg/m²          Physical Exam  Vitals signs reviewed  Constitutional:       Appearance: He is obese  Skin:     Comments: The incision is healing well  There is no cellulitis  Drain is in situ  Sutures are in situ  Neurological:      Mental Status: He is alert

## 2021-04-14 NOTE — LETTER
To whom it may concern,     Hui Menjivar was asked to call our office on 04/12/2021 to speak to Dr Fernandes Breeding  He may have been asked to come into the office on that day               Thank you,             Dr Richie Krishnan MD

## 2021-04-15 ENCOUNTER — TELEPHONE (OUTPATIENT)
Dept: SURGERY | Facility: CLINIC | Age: 56
End: 2021-04-15

## 2021-04-15 NOTE — TELEPHONE ENCOUNTER
Post procedure call form 4/8/21 with Dr Alma Espinoza  Patient was very happy with all of the staff at the [facility]  He couldn't say enough of how well he was treated  He is doing well

## 2021-04-21 ENCOUNTER — OFFICE VISIT (OUTPATIENT)
Dept: SURGERY | Facility: CLINIC | Age: 56
End: 2021-04-21

## 2021-04-21 VITALS
RESPIRATION RATE: 18 BRPM | DIASTOLIC BLOOD PRESSURE: 90 MMHG | WEIGHT: 289 LBS | HEIGHT: 64 IN | BODY MASS INDEX: 49.34 KG/M2 | HEART RATE: 104 BPM | SYSTOLIC BLOOD PRESSURE: 168 MMHG | TEMPERATURE: 98.4 F

## 2021-04-21 DIAGNOSIS — Z48.89 POSTOPERATIVE VISIT: Primary | ICD-10-CM

## 2021-04-21 DIAGNOSIS — Z12.11 ENCOUNTER FOR SCREENING COLONOSCOPY: ICD-10-CM

## 2021-04-21 PROCEDURE — 99024 POSTOP FOLLOW-UP VISIT: CPT | Performed by: SURGERY

## 2021-04-21 PROCEDURE — 3008F BODY MASS INDEX DOCD: CPT | Performed by: SURGERY

## 2021-04-21 NOTE — PROGRESS NOTES
Assessment/Plan:  I removed the sutures  I applied Steri-Strips  A dressing was done using ABD  I told him that the seroma may resolve spontaneously or will drain out  I told him that if there is drainage he should give me a call  I am going to see him p r n  No problem-specific Assessment & Plan notes found for this encounter  Diagnoses and all orders for this visit:    Postoperative visit    Encounter for screening colonoscopy  -     Ambulatory referral for colon cancer education; Future          Subjective:      Patient ID: Linda Perea is a 64 y o  male  59-year-old male patient who is 2 weeks status post excision of lipoma from his back  He came here for removal of sutures  He told me that he goes to his gastroenterologist for colonoscopy  He has already had 3 colonoscopies done in the past       The following portions of the patient's history were reviewed and updated as appropriate: allergies, current medications, past family history, past medical history, past surgical history and problem list     Review of Systems   All other systems reviewed and are negative          Objective:      /90 (BP Location: Left arm, Patient Position: Sitting, Cuff Size: Adult)   Pulse 104   Temp 98 4 °F (36 9 °C)   Resp 18   Ht 5' 4" (1 626 m)   Wt 131 kg (289 lb)   BMI 49 61 kg/m²          Physical Exam  Pulmonary:

## 2021-05-07 ENCOUNTER — RA CDI HCC (OUTPATIENT)
Dept: OTHER | Facility: HOSPITAL | Age: 56
End: 2021-05-07

## 2021-05-07 NOTE — PROGRESS NOTES
[facility]  coding opportunities          Chart reviewed, no opportunity found: CHART REVIEWED, NO OPPORTUNITY FOUND              Patients insurance company: [employer] (Medicare and Commercial for [employer] and [employer])

## 2021-05-11 ENCOUNTER — HOSPITAL ENCOUNTER (OUTPATIENT)
Dept: RADIOLOGY | Facility: HOSPITAL | Age: 56
Discharge: HOME | End: 2021-05-11
Attending: SURGERY
Payer: COMMERCIAL

## 2021-05-11 ENCOUNTER — TRANSCRIBE ORDERS (OUTPATIENT)
Dept: RADIOLOGY | Facility: HOSPITAL | Age: 56
End: 2021-05-11

## 2021-05-11 DIAGNOSIS — R20.2 NUMBNESS AND TINGLING IN LEFT HAND: ICD-10-CM

## 2021-05-11 DIAGNOSIS — R20.0 NUMBNESS AND TINGLING IN LEFT HAND: ICD-10-CM

## 2021-05-11 PROCEDURE — 76882 US LMTD JT/FCL EVL NVASC XTR: CPT

## 2021-05-13 ENCOUNTER — OFFICE VISIT (OUTPATIENT)
Dept: FAMILY MEDICINE CLINIC | Facility: CLINIC | Age: 56
End: 2021-05-13
Payer: COMMERCIAL

## 2021-05-13 ENCOUNTER — OFFICE VISIT (OUTPATIENT)
Dept: OBGYN CLINIC | Facility: CLINIC | Age: 56
End: 2021-05-13
Payer: COMMERCIAL

## 2021-05-13 VITALS
BODY MASS INDEX: 48.65 KG/M2 | DIASTOLIC BLOOD PRESSURE: 85 MMHG | WEIGHT: 285 LBS | HEIGHT: 64 IN | SYSTOLIC BLOOD PRESSURE: 153 MMHG | HEART RATE: 85 BPM

## 2021-05-13 VITALS
HEIGHT: 64 IN | HEART RATE: 76 BPM | OXYGEN SATURATION: 97 % | TEMPERATURE: 97.1 F | SYSTOLIC BLOOD PRESSURE: 158 MMHG | WEIGHT: 286.6 LBS | BODY MASS INDEX: 48.93 KG/M2 | DIASTOLIC BLOOD PRESSURE: 80 MMHG

## 2021-05-13 DIAGNOSIS — R73.03 PREDIABETES: ICD-10-CM

## 2021-05-13 DIAGNOSIS — E79.0 ELEVATED BLOOD URIC ACID LEVEL: ICD-10-CM

## 2021-05-13 DIAGNOSIS — Z28.39 IMMUNIZATION DEFICIENCY: ICD-10-CM

## 2021-05-13 DIAGNOSIS — K11.1 PAROTID GLAND ENLARGEMENT: Primary | ICD-10-CM

## 2021-05-13 DIAGNOSIS — Z78.9 NONIMMUNE TO HEPATITIS B VIRUS: ICD-10-CM

## 2021-05-13 DIAGNOSIS — G56.02 LEFT CARPAL TUNNEL SYNDROME: ICD-10-CM

## 2021-05-13 DIAGNOSIS — M65.322 TRIGGER FINGER, LEFT INDEX FINGER: ICD-10-CM

## 2021-05-13 DIAGNOSIS — Z00.00 ANNUAL PHYSICAL EXAM: ICD-10-CM

## 2021-05-13 DIAGNOSIS — M65.311 TRIGGER THUMB OF RIGHT HAND: Primary | ICD-10-CM

## 2021-05-13 DIAGNOSIS — E66.01 MORBID OBESITY WITH BMI OF 50.0-59.9, ADULT (HCC): ICD-10-CM

## 2021-05-13 PROCEDURE — 1036F TOBACCO NON-USER: CPT | Performed by: FAMILY MEDICINE

## 2021-05-13 PROCEDURE — 99396 PREV VISIT EST AGE 40-64: CPT | Performed by: FAMILY MEDICINE

## 2021-05-13 PROCEDURE — 99214 OFFICE O/P EST MOD 30 MIN: CPT | Performed by: SURGERY

## 2021-05-13 PROCEDURE — 3008F BODY MASS INDEX DOCD: CPT | Performed by: FAMILY MEDICINE

## 2021-05-13 PROCEDURE — 20550 NJX 1 TENDON SHEATH/LIGAMENT: CPT | Performed by: SURGERY

## 2021-05-13 RX ORDER — DEXAMETHASONE SODIUM PHOSPHATE 100 MG/10ML
40 INJECTION INTRAMUSCULAR; INTRAVENOUS
Status: COMPLETED | OUTPATIENT
Start: 2021-05-13 | End: 2021-05-13

## 2021-05-13 RX ORDER — LIDOCAINE HYDROCHLORIDE 10 MG/ML
1 INJECTION, SOLUTION INFILTRATION; PERINEURAL
Status: COMPLETED | OUTPATIENT
Start: 2021-05-13 | End: 2021-05-13

## 2021-05-13 RX ADMIN — DEXAMETHASONE SODIUM PHOSPHATE 40 MG: 100 INJECTION INTRAMUSCULAR; INTRAVENOUS at 09:47

## 2021-05-13 RX ADMIN — LIDOCAINE HYDROCHLORIDE 1 ML: 10 INJECTION, SOLUTION INFILTRATION; PERINEURAL at 09:47

## 2021-05-13 NOTE — PATIENT INSTRUCTIONS

## 2021-05-13 NOTE — PROGRESS NOTES
ADULT ANNUAL [address] [place],  [address] PRIMARY CARE [place]    NAME: Trent Page  AGE: 64 y o  SEX: male  : 1965     DATE: 2021     Assessment and Plan:       Patient does had surgery for large lipoma removed 7-1/2 pound on his mid upper back  He is healing well  He is not immune to MMR hep B needs vaccine in July  Currently he will get his COVID vaccine done  Will monitor his blood pressure  Advised for ENT evaluation for left parotid gland swelling  Will monitor his blood sugar cholesterol and vitamins level and gout level  Problem List Items Addressed This Visit        Digestive    Parotid gland enlargement - Primary       Other    Morbid obesity with BMI of 50 0-59 9, adult ([facility] [address] )    Prediabetes    Nonimmune to hepatitis B virus    Immunization deficiency    Elevated blood uric acid level      Other Visit Diagnoses     Annual physical exam              Immunizations and preventive care screenings were discussed with patient today  Appropriate education was printed on patient's after visit summary  Counseling:  · Sexual health: discussed sexually transmitted diseases, partner selection, use of condoms, avoidance of unintended pregnancy, and contraceptive alternatives  Return in about 2 months (around 2021) for 30 min f/u bp/mmr/hep b/tdap vaccine  Chief Complaint:     Chief Complaint   Patient presents with    Annual Exam    Follow-up     3 month follow up       History of Present Illness:     Adult Annual Physical   Patient here for a comprehensive physical exam  The patient reports no problems  Diet and Physical Activity  · Diet/Nutrition: well balanced diet  · Exercise: no formal exercise  Depression Screening  PHQ-9 Depression Screening    PHQ-9:   Frequency of the following problems over the past two weeks:           General Health  · Sleep: sleeps well  · Hearing: normal - bilateral   · Vision: no vision problems  · Dental: regular dental visits   Health  · Symptoms include: none     Review of Systems:     Review of Systems   Constitutional: Negative for activity change, appetite change, fatigue, fever and unexpected weight change  HENT: Negative for dental problem and trouble swallowing  Eyes: Negative for photophobia and visual disturbance  Respiratory: Negative for cough and chest tightness  Cardiovascular: Negative for chest pain, palpitations and leg swelling  Gastrointestinal: Negative for abdominal pain, constipation and vomiting  Endocrine: Negative for cold intolerance, polydipsia and polyuria  Genitourinary: Negative for difficulty urinating, frequency and urgency  Musculoskeletal: Negative for arthralgias, joint swelling, myalgias and neck pain  Skin: Negative for color change, rash and wound  Allergic/Immunologic: Negative for environmental allergies  Neurological: Negative for dizziness, weakness and numbness  Hematological: Does not bruise/bleed easily  Psychiatric/Behavioral: Negative for decreased concentration, dysphoric mood, self-injury, sleep disturbance and suicidal ideas        Past Medical History:     Past Medical History:   Diagnosis Date    Anxiety     GERD (gastroesophageal reflux disease)     Joint pain in both hands 2/19/2021    Kidney stone     Kidney stones 2/19/2021    Mass of anterior abdominal wall 2/19/2021    LLQ 9x6 cm    [facility] infarction) [facility] [address] [facility]     Morbid obesity with BMI of 50 0-59 9, adult ([facility] [facility] )     Nonimmune to hepatitis B virus 3/4/2021    Parotid gland enlargement 2/19/2021    left    Prediabetes 3/4/2021    Sarcoidosis 2/19/2021    Sleep apnea     cpap    Soft tissue mass 2/19/2021    R upper back 9x 11x3cm L upper back 14x20x 4 cm      Past Surgical History:     Past Surgical History:   Procedure Laterality Date    BRONCHOSCOPY      COLONOSCOPY      EGD      HAND SURGERY Bilateral 2019    trigger finger and carpal tunnel     KNEE ARTHROSCOPY      meniscus surgery    MS EXCISION TUMOR SOFT TISSUE BACK/FLANK SUBQ 3+CM N/A 4/8/2021    Procedure: excision large mass back subfascial  25x 13x 3;  Surgeon: Kala Ward MD;  Location:  MAIN OR;  Service: General    VASECTOMY        Family History:     Family History   Problem Relation Age of Onset    Heart disease Mother     Diabetes Mother     Heart disease Father     Cancer Father         blood cancer    Heart disease Paternal Aunt     Heart disease Paternal Uncle     Diabetes Maternal Grandfather     Stroke Neg Hx       Social History:     E-Cigarette/Vaping    E-Cigarette Use Never User      E-Cigarette/Vaping Substances    Nicotine No     THC No     CBD No     Flavoring No     Other No     Unknown No      Social History     Socioeconomic History    Marital status: /Civil Union     Spouse name: None    Number of children: None    Years of education: None    Highest education level: None   Occupational History    None   Social Needs    Financial resource strain: None    Food insecurity     Worry: None     Inability: None    Transportation needs     Medical: None     Non-medical: None   Tobacco Use    Smoking status: Never Smoker    Smokeless tobacco: Never Used   Substance and Sexual Activity    Alcohol use: No    Drug use: No    Sexual activity: Yes     Partners: Female     Birth control/protection: None   Lifestyle    Physical activity     Days per week: None     Minutes per session: None    Stress: None   Relationships    Social connections     Talks on phone: None     Gets together: None     Attends [employer] service: None     Active member of club or organization: None     Attends meetings of clubs or organizations: None     Relationship status: None    Intimate partner violence     Fear of current or ex partner: None     Emotionally abused: None     Physically abused: None     Forced sexual activity: None   Other Topics Concern  None   Social History Narrative    None      Current Medications:     Current Outpatient Medications   Medication Sig Dispense Refill    Ascorbic Acid (vitamin C) 100 MG tablet Take 100 mg by mouth daily      aspirin (Aspirin 81) 81 mg EC tablet Take 1 tablet (81 mg total) by mouth daily You may restart your daily aspirin on Sunday April 11th   0    atorvastatin (LIPITOR) 20 mg tablet Take 20 mg by mouth daily      Cholecalciferol (VITAMIN D PO) Take 10,000 Units by mouth every other day       escitalopram (LEXAPRO) 10 mg tablet daily       Multiple Vitamins-Minerals (multivitamin with minerals) tablet Take 1 tablet by mouth daily      pantoprazole (PROTONIX) 40 mg Take 40 mg by mouth daily Protonix 40 MG Oral Packet  Refills: 0  Active       oxyCODONE-acetaminophen (PERCOCET) 5-325 mg per tablet Take 1 tablet by mouth every 4 (four) hours as needed for moderate pain for up to 20 dosesMax Daily Amount: 6 tablets 20 tablet 0     No current facility-administered medications for this visit  Allergies: Allergies   Allergen Reactions    Azithromycin Abdominal Pain    Erythromycin Abdominal Pain      Physical Exam:     /80 (BP Location: Left arm, Patient Position: Sitting, Cuff Size: Standard)   Pulse 76   Temp (!) 97 1 °F (36 2 °C) (Temporal)   Ht 5' 4" (1 626 m)   Wt 130 kg (286 lb 9 6 oz)   SpO2 97%   BMI 49 19 kg/m²     Physical Exam  Vitals signs and nursing note reviewed  Constitutional:       Appearance: Normal appearance  He is well-developed  He is obese  HENT:      Head: Normocephalic and atraumatic  Right Ear: Tympanic membrane, ear canal and external ear normal       Left Ear: Tympanic membrane, ear canal and external ear normal    Eyes:      Extraocular Movements: Extraocular movements intact  Conjunctiva/sclera: Conjunctivae normal       Pupils: Pupils are equal, round, and reactive to light     Neck:      Musculoskeletal: Normal range of motion and neck supple  Cardiovascular:      Rate and Rhythm: Normal rate and regular rhythm  Pulses: Normal pulses  Heart sounds: Normal heart sounds  No murmur  Pulmonary:      Effort: Pulmonary effort is normal  No respiratory distress  Breath sounds: Normal breath sounds  Abdominal:      General: Abdomen is flat  Bowel sounds are normal       Palpations: Abdomen is soft  Tenderness: There is no abdominal tenderness  Genitourinary:     Penis: Normal        Scrotum/Testes: Normal       Prostate: Normal       Rectum: Normal  Guaiac result negative  Musculoskeletal: Normal range of motion  Skin:     General: Skin is warm and dry  Capillary Refill: Capillary refill takes less than 2 seconds  Neurological:      General: No focal deficit present  Mental Status: He is alert and oriented to person, place, and time  Mental status is at baseline  Psychiatric:         Mood and Affect: Mood normal          Behavior: Behavior normal          Thought Content:  Thought content normal          Judgment: Judgment normal           Sid Garrison MD  [facility]

## 2021-05-13 NOTE — PROGRESS NOTES
ASSESSMENT/PLAN:      65 yo male with right thumb trigger finger, left index trigger finger, and left carpal tunnel syndrome     Ultrasound results were reviewed and demonstrate evidence of left carpal tunnel syndrome  We discussed treatment options including continued bracing at night, possible steroid injection, and eventually surgery if conservative measures fail  Patient also has trigger fingers of the left index and right thumb  He was unable to get steroid injections at his last visit due to an upcoming surgery but we discussed the options today  Risks, benefits, and alternatives were discussed and patient elected to proceed with initial right thumb and left index trigger finger injections  He elected to defer the carpal tunnel injection today and will work on wearing his brace more regularly at night  Patient like to avoid surgery as much as possible, 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  We will see him in 6 weeks, consider 2nd injections for the trigger fingers at that time or possible carpal tunnel injection if he would like  The patient verbalized understanding of exam findings and treatment plan  We engaged in the shared decision-making process and treatment options were discussed at length with the patient  Surgical and conservative management discussed today along with risks and benefits  Diagnoses and all orders for this visit:    Trigger thumb of right hand  -     Hand/upper extremity injection    Left carpal tunnel syndrome    Trigger finger, left index finger  -     Hand/upper extremity injection        Follow Up:  Return in about 6 weeks (around 6/24/2021) for Recheck        To Do Next Visit:  Re-evaluation of current issue    ____________________________________________________________________________________________________________________________________________      CHIEF COMPLAINT:  Chief Complaint   Patient presents with    Left Wrist - Follow-up, Pain SUBJECTIVE:  Malena Reese is a 64y o  year old RHD male who presents for follow up evaluation of the bilateral hands  He has a right trigger thumb, left index trigger, and left carpal tunnel  Patient was unable to get CSI at his last visit due to an upcoming surgery which went well  He continues to note pain in the A1 pulleys of the affected fingers and paresthesias in the left hand  He does have a carpal tunnel brace which he wears at night periodically  I have personally reviewed all the relevant PMH, PSH, SH, FH, Medications and allergies       PAST MEDICAL HISTORY:  Past Medical History:   Diagnosis Date    Anxiety     GERD (gastroesophageal reflux disease)     Joint pain in both hands 2/19/2021    Kidney stone     Kidney stones 2/19/2021    Mass of anterior abdominal wall 2/19/2021    LLQ 9x6 cm    MI (myocardial infarction) ([facility], [place] [address] )     Morbid obesity with BMI of 50 0-59 9, adult ([facility] [place] [address] )     Nonimmune to hepatitis B virus 3/4/2021    Parotid gland enlargement 2/19/2021    left    Prediabetes 3/4/2021    Sarcoidosis 2/19/2021    Sleep apnea     cpap    Soft tissue mass 2/19/2021    R upper back 9x 11x3cm L upper back 14x20x 4 cm       PAST SURGICAL HISTORY:  Past Surgical History:   Procedure Laterality Date    BRONCHOSCOPY      COLONOSCOPY      EGD      HAND SURGERY Bilateral 2019    trigger finger and carpal tunnel     KNEE ARTHROSCOPY      meniscus surgery    MI EXCISION TUMOR SOFT TISSUE BACK/FLANK SUBQ 3+CM N/A 4/8/2021    Procedure: excision large mass back subfascial  25x 13x 3;  Surgeon: Ana Wild MD;  Location:  MAIN OR;  Service: General    VASECTOMY         FAMILY HISTORY:  Family History   Problem Relation Age of Onset    Heart disease Mother     Diabetes Mother     Heart disease Father     Cancer Father         blood cancer    Heart disease Paternal Aunt     Heart disease Paternal Uncle     Diabetes Maternal Grandfather     Stroke Neg Hx        SOCIAL HISTORY:  Social History     Tobacco Use    Smoking status: Never Smoker    Smokeless tobacco: Never Used   Substance Use Topics    Alcohol use: No    Drug use: No       MEDICATIONS:    Current Outpatient Medications:     Ascorbic Acid (vitamin C) 100 MG tablet, Take 100 mg by mouth daily, Disp: , Rfl:     aspirin (Aspirin 81) 81 mg EC tablet, Take 1 tablet (81 mg total) by mouth daily You may restart your daily aspirin on Sunday April 11th , Disp: , Rfl: 0    atorvastatin (LIPITOR) 20 mg tablet, Take 20 mg by mouth daily, Disp: , Rfl:     Cholecalciferol (VITAMIN D PO), Take 10,000 Units by mouth every other day , Disp: , Rfl:     escitalopram (LEXAPRO) 10 mg tablet, daily , Disp: , Rfl:     Multiple Vitamins-Minerals (multivitamin with minerals) tablet, Take 1 tablet by mouth daily, Disp: , Rfl:     pantoprazole (PROTONIX) 40 mg, Take 40 mg by mouth daily Protonix 40 MG Oral Packet  Refills: 0  Active , Disp: , Rfl:     oxyCODONE-acetaminophen (PERCOCET) 5-325 mg per tablet, Take 1 tablet by mouth every 4 (four) hours as needed for moderate pain for up to 20 dosesMax Daily Amount: 6 tablets, Disp: 20 tablet, Rfl: 0    ALLERGIES:  Allergies   Allergen Reactions    Azithromycin Abdominal Pain    Erythromycin Abdominal Pain       REVIEW OF SYSTEMS:  Review of Systems   Constitutional: Negative for chills, fatigue, fever and unexpected weight change  HENT: Negative for hearing loss, nosebleeds and sore throat  Eyes: Negative for pain, redness and visual disturbance  Respiratory: Negative for cough, shortness of breath and wheezing  Cardiovascular: Negative for chest pain, palpitations and leg swelling  Gastrointestinal: Negative for abdominal pain, nausea and vomiting  Endocrine: Negative for polydipsia and polyuria  Genitourinary: Negative for difficulty urinating and hematuria  Musculoskeletal: Positive for arthralgias  Negative for joint swelling and myalgias     Skin: Negative for rash and wound  Neurological: Positive for numbness  Negative for dizziness and headaches  Psychiatric/Behavioral: Negative for decreased concentration, dysphoric mood and suicidal ideas  The patient is not nervous/anxious  VITALS:  Vitals:    05/13/21 0840   BP: 153/85   Pulse: 85       LABS:  HgA1c:   Lab Results   Component Value Date    HGBA1C 6 2 (H) 02/20/2021     BMP:   Lab Results   Component Value Date    CALCIUM 9 4 02/20/2021    K 4 2 02/20/2021    CO2 26 02/20/2021     02/20/2021    BUN 16 02/20/2021    CREATININE 1 19 02/20/2021       _____________________________________________________  PHYSICAL EXAMINATION:  General: well developed and well nourished, alert, oriented times 3 and appears comfortable  Psychiatric: Normal  HEENT: Normocephalic, Atraumatic Trachea Midline, No torticollis  Pulmonary: No audible wheezing or respiratory distress   Cardiovascular: No pitting edema, 2+ radial pulse   Skin: No masses, erythema, lacerations, fluctation, ulcerations  Neurovascular: Sensation Intact to the Median, Ulnar, Radial Nerve, Motor Intact to the Median, Ulnar, Radial Nerve and Pulses Intact  Musculoskeletal: Normal, except as noted in detailed exam and in HPI  MUSCULOSKELETAL EXAMINATION:  right first finger:  Positive palpable nodule over the A1 pulley  Positive tenderness to palpation over A1 pulley  Negative catching  Positive clicking  left index finger:  Negative palpable nodule over the A1 pulley  Positive tenderness to palpation over A1 pulley  Negative catching  Positive clicking  Left Carpal Tunnel Exam:    Negative thenar atrophy  Positive phalen's test  Positive carpal tunnel compression  Negative tinels over median nerve at the wrist   Opposition strength 5/5    Abduction strength 5/5       ___________________________________________________  STUDIES REVIEWED:  I have personally reviewed ultrasound of the left wrist which demonstrates evidence of carpal tunnel syndrome           PROCEDURES PERFORMED:  Hand/upper extremity injection: R thumb A1  Universal Protocol:  Consent: Verbal consent obtained  Risks and benefits: risks, benefits and alternatives were discussed  Consent given by: patient  Time out: Immediately prior to procedure a "time out" was called to verify the correct patient, procedure, equipment, support staff and site/side marked as required  Patient understanding: patient states understanding of the procedure being performed  Site marked: the operative site was marked  Radiology Images displayed and confirmed  If images not available, report reviewed: imaging studies available  Required items: required blood products, implants, devices, and special equipment available  Patient identity confirmed: verbally with patient    Supporting Documentation  Indications: pain and therapeutic   Procedure Details  Condition:trigger finger Location: thumb - R thumb A1   Preparation: Patient was prepped and draped in the usual sterile fashion  Needle size: 25 G  Ultrasound guidance: no  Approach: volar  Medications administered: 1 mL lidocaine 1 %; 40 mg dexamethasone 100 mg/10 mL    Patient tolerance: patient tolerated the procedure well with no immediate complications  Dressing:  Sterile dressing applied     Hand/upper extremity injection: L index A1  Universal Protocol:  Consent: Verbal consent obtained  Risks and benefits: risks, benefits and alternatives were discussed  Consent given by: patient  Time out: Immediately prior to procedure a "time out" was called to verify the correct patient, procedure, equipment, support staff and site/side marked as required    Patient understanding: patient states understanding of the procedure being performed  Site marked: the operative site was marked  Required items: required blood products, implants, devices, and special equipment available  Patient identity confirmed: verbally with patient    Supporting Documentation  Indications: pain and therapeutic   Procedure Details  Condition:trigger finger Location: index finger - L index A1   Preparation: Patient was prepped and draped in the usual sterile fashion  Needle size: 25 G  Ultrasound guidance: no  Approach: volar  Medications administered: 1 mL lidocaine 1 %; 40 mg dexamethasone 100 mg/10 mL    Patient tolerance: patient tolerated the procedure well with no immediate complications  Dressing:  Sterile dressing applied              _____________________________________________________    Scribe Attestation    I,:  Alex Mello PA-C am acting as a scribe while in the presence of the attending physician :       I,:  Jillian Schumacher MD personally performed the services described in this documentation    as scribed in my presence :

## 2021-05-22 ENCOUNTER — IMMUNIZATIONS (OUTPATIENT)
Dept: FAMILY MEDICINE CLINIC | Facility: HOSPITAL | Age: 56
End: 2021-05-22

## 2021-05-22 DIAGNOSIS — Z23 ENCOUNTER FOR IMMUNIZATION: Primary | ICD-10-CM

## 2021-05-22 PROCEDURE — 91300 SARS-COV-2 / COVID-19 MRNA VACCINE (PFIZER-BIONTECH) 30 MCG: CPT

## 2021-05-22 PROCEDURE — 0001A SARS-COV-2 / COVID-19 MRNA VACCINE (PFIZER-BIONTECH) 30 MCG: CPT

## 2021-06-16 ENCOUNTER — IMMUNIZATIONS (OUTPATIENT)
Dept: FAMILY MEDICINE CLINIC | Facility: HOSPITAL | Age: 56
End: 2021-06-16

## 2021-06-16 DIAGNOSIS — Z23 ENCOUNTER FOR IMMUNIZATION: Primary | ICD-10-CM

## 2021-06-16 PROCEDURE — 91300 SARS-COV-2 / COVID-19 MRNA VACCINE (PFIZER-BIONTECH) 30 MCG: CPT

## 2021-06-16 PROCEDURE — 0002A SARS-COV-2 / COVID-19 MRNA VACCINE (PFIZER-BIONTECH) 30 MCG: CPT

## 2021-06-24 ENCOUNTER — OFFICE VISIT (OUTPATIENT)
Dept: OBGYN CLINIC | Facility: CLINIC | Age: 56
End: 2021-06-24
Payer: COMMERCIAL

## 2021-06-24 VITALS
SYSTOLIC BLOOD PRESSURE: 147 MMHG | HEART RATE: 66 BPM | BODY MASS INDEX: 50.88 KG/M2 | DIASTOLIC BLOOD PRESSURE: 84 MMHG | HEIGHT: 64 IN | WEIGHT: 298 LBS

## 2021-06-24 DIAGNOSIS — G56.02 LEFT CARPAL TUNNEL SYNDROME: ICD-10-CM

## 2021-06-24 DIAGNOSIS — Z01.812 PRE-PROCEDURE LAB EXAM: ICD-10-CM

## 2021-06-24 DIAGNOSIS — M65.311 TRIGGER THUMB OF RIGHT HAND: Primary | ICD-10-CM

## 2021-06-24 DIAGNOSIS — M65.322 TRIGGER FINGER, LEFT INDEX FINGER: ICD-10-CM

## 2021-06-24 PROCEDURE — 99214 OFFICE O/P EST MOD 30 MIN: CPT | Performed by: SURGERY

## 2021-06-24 NOTE — H&P
ASSESSMENT/PLAN:      64 y o  male with a right thumb trigger finger, left index trigger finger and left carpal tunnel syndrome  Left index trigger finger release and left open carpal tunnel release was discussed at length including risks and benefits  Risks of surgery consist of but not limited to bleeding, infection, stiffness, injury to nerves and surrounding structures, incomplete resolution of paraesthesia's, pillar pain, etc  Towana Balls elected to proceed with surgical intervention, left index trigger finger release and left open carpal tunnel release informed surgical consent was signed in the office today  Surgery will planned for November  Due to sleep apnea, surgery will be performed for Garrett  Lab work and EKG will be obtained prior to surgery  He was provided with co-ban to be wrapped around the PIP joint, to avoid locking while cooking  Follow up in the office 10-14 days after surgery for suture removal       The patient verbalized understanding of exam findings and treatment plan  We engaged in the shared decision-making process and treatment options were discussed at length with the patient  Surgical and conservative management discussed today along with risks and benefits  Diagnoses and all orders for this visit:    Trigger thumb of right hand    Left carpal tunnel syndrome    Trigger finger, left index finger      Open Carpal Tunnel Release: The anatomy and physiology of carpal tunnel syndrome was discussed with the patient today  Increase pressure localized under the transverse carpal ligament can cause pain, numbness, tingling, or dysesthesias within the median nerve distribution as well as feelings of fatigue, clumsiness, or awkwardness  These symptoms typically occur at night and worse in the morning upon waking  Eventually, untreated carpal tunnel syndrome can result in weakness and permanent loss of muscle within the thenar compartment of the hand    Treatment options were discussed with the patient  Conservative treatment includes nocturnal resting splints to keep the nerve in a neutral position, ergonomic changes within the work or home environment, activity modification, and tendon gliding exercises  Vitamin B6 one tablet daily over the counter may helpful to reduce symptoms  Steroid injections within the carpal canal can help a majority of patients, however this is often self-limited in a majority of patients  Surgical intervention to divide the transverse carpal ligament typically results in a long-lasting relief of the patient's complaints, with the recurrence rate of less than 1%  The patient has elected to undergo an open carpal tunnel release  The palmar incision technique was discussed in the office with the patient today  In the postoperative period, light activities are allowed immediately, driving is allowed when narcotic medication has stopped, and the bandages may be removed and incision may get wet after 2 days  Heavy activities (lifting more than approximately 10 pounds) will be allowed after follow up appointment in 1-2 weeks  While night symptoms (waking from sleep, pain, and discomfort in the hands) generally improves rapidly, the numbness and tingling as well as the strength will slowly improve over weeks to months depending on the chronicity and severity of the carpal tunnel syndrome  Pillar pain and scar discomfort were discussed with the patient which are self-limiting conditions  The risks of bleeding and infection from the surgery are less than 1%  Risk of recurrence is approximately 0 5%  The risks of nerve injury or nerve damage or damage to the blood vessels is approximately 1 in 1200  The patient has an understanding of the above mentioned discussion  The risks and benefits of the procedure were explained to the patient, which include, but are not limited to: Bleeding, infection, recurrence, pain, scar, damage to tendons, damage to nerves, and damage to blood vessels, failure to give desired results and complications related to anesthesia  These risks, along with alternative conservative treatment options, and postoperative protocols were voiced back and understood by the patient  All questions were answered to the patient's satisfaction  The patient agrees to comply with a standard postoperative protocol, and is willing to proceed  Education was provided via written and auditory forms  There were no barriers to learning  Written handouts regarding wound care, incision and scar care, and general preoperative information was provided to the patient  Prior to surgery, the patient may be requested to stop all anti-inflammatory medications  Prophylactic aspirin, Plavix, and Coumadin may be allowed to be continued  Medications including vitamin E , ginkgo, and fish oil are requested to be stopped approximately one week prior to surgery  Trigger Finger Release: The anatomy and physiology of trigger finger was discussed with the patient today in the office  Edema and increased contact pressure within the flexor tendons at the A1 pulley can cause pain, crepitation, and limitation of function  Treatment options include resting MP blocking splints to decrease edema, oral anti-inflammatory medications, home or formal therapy exercises, up to 2 steroid injections or surgical release  While majority of patients do respond to conservative treatment, up to 20% may require surgical release  The patient has elected release of the trigger finger  The patient has elected to undergo a release of the A1 pulley (trigger finger)  A small incision will be made over the palmar aspect of the hand, the tendon sheath holding the flexor tendons will be released  In the postoperative period, light activities are allowed immediately, driving is allowed when narcotic medication has stopped, and the incision may get wet after 2 days    Heavy activities (lifting more than approximately 10 pounds) will be allowed after the follow up appointment in 1-2 weeks  While the pain and discomfort within the wrist typically improves rapidly, some residual discomfort may be present for up to 6 weeks  The nodule that is typically palpable in the palmar aspect of the hand will not be removed, as this would necessitate removal of a portion of the flexor tendon, however the catching, clicking, and locking should resolve  Approximate success rate is 98%  The risks and benefits of the procedure were explained to the patient, which include, but are not limited to: Bleeding, infection, recurrence, pain, scar, damage to tendons, damage to nerves, and damage to blood vessels, need for future surgery and complications related to anesthesia  If bony work is done, risks also include malunion and nonunion  These risks, along with alternative conservative treatment options, and postoperative protocols were voiced back and understood by the patient  All questions were answered to the patient's satisfaction  The patient agrees to comply with a standard postoperative protocol, and is willing to proceed  Education was provided via written and auditory forms  There were no barriers to learning  Written handouts regarding wound care, incision and scar care, and general preoperative information, as well as risks and benefits were provided to the patient  Follow Up:  No follow-ups on file  To Do Next Visit:  Re-evaluation of current issue and Sutures out    ____________________________________________________________________________________________________________________________________________      CHIEF COMPLAINT:  Chief Complaint   Patient presents with    Right Hand - Follow-up    Left Hand - Pain, Follow-up, Numbness       SUBJECTIVE:  Amy Nolan is a 64y o  year old RHD male who presents to the office today for a follow up regarding trigger fingers and carpal tunnel syndrome   Sophie Mccollum was last seen in the office aprox  6 weeks ago, at which time he underwent a right thumb and left index trigger finger CSI  Ced Malik states that his right thumb has mostly resolved after the trigger finger CSI  He states that his left index finger is still clicking, locking and catching  He also notes continued numbness/tingling to his left hand despite being more consistent with nighttime bracing  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        I have personally reviewed all the relevant PMH, PSH, SH, FH, Medications and allergies       PAST MEDICAL HISTORY:  Past Medical History:   Diagnosis Date    Anxiety     GERD (gastroesophageal reflux disease)     Joint pain in both hands 2/19/2021    Kidney stone     Kidney stones 2/19/2021    Mass of anterior abdominal wall 2/19/2021    LLQ 9x6 cm    [facility] infarction) [facility] [address] [facility]     Morbid obesity with BMI of 50 0-59 9, adult ([facility] [address] )     Nonimmune to hepatitis B virus 3/4/2021    Parotid gland enlargement 2/19/2021    left    Prediabetes 3/4/2021    Sarcoidosis 2/19/2021    Sleep apnea     cpap    Soft tissue mass 2/19/2021    R upper back 9x 11x3cm L upper back 14x20x 4 cm       PAST SURGICAL HISTORY:  Past Surgical History:   Procedure Laterality Date    BRONCHOSCOPY      COLONOSCOPY      EGD      HAND SURGERY Bilateral 2019    trigger finger and carpal tunnel     KNEE ARTHROSCOPY      meniscus surgery    VT EXCISION TUMOR SOFT TISSUE BACK/FLANK SUBQ 3+CM N/A 4/8/2021    Procedure: excision large mass back subfascial  25x 13x 3;  Surgeon: Trisha Tavares MD;  Location:  MAIN OR;  Service: General    VASECTOMY         FAMILY HISTORY:  Family History   Problem Relation Age of Onset    Heart disease Mother     Diabetes Mother     Heart disease Father     Cancer Father         blood cancer    Heart disease Paternal Aunt     Heart disease Paternal Uncle     Diabetes Maternal Grandfather     Stroke Neg Hx        SOCIAL HISTORY:  Social History     Tobacco Use    Smoking status: Never Smoker    Smokeless tobacco: Never Used   Vaping Use    Vaping Use: Never used   Substance Use Topics    Alcohol use: No    Drug use: No       MEDICATIONS:    Current Outpatient Medications:     Ascorbic Acid (vitamin C) 100 MG tablet, Take 100 mg by mouth daily, Disp: , Rfl:     aspirin (Aspirin 81) 81 mg EC tablet, Take 1 tablet (81 mg total) by mouth daily You may restart your daily aspirin on Sunday April 11th , Disp: , Rfl: 0    atorvastatin (LIPITOR) 20 mg tablet, Take 20 mg by mouth daily, Disp: , Rfl:     Cholecalciferol (VITAMIN D PO), Take 10,000 Units by mouth every other day , Disp: , Rfl:     escitalopram (LEXAPRO) 10 mg tablet, daily , Disp: , Rfl:     Multiple Vitamins-Minerals (multivitamin with minerals) tablet, Take 1 tablet by mouth daily, Disp: , Rfl:     pantoprazole (PROTONIX) 40 mg, Take 40 mg by mouth daily Protonix 40 MG Oral Packet  Refills: 0  Active , Disp: , Rfl:     ALLERGIES:  Allergies   Allergen Reactions    Azithromycin Abdominal Pain    Erythromycin Abdominal Pain       REVIEW OF SYSTEMS:  Review of Systems   Constitutional: Negative for chills, fever and unexpected weight change  HENT: Negative for hearing loss, nosebleeds and sore throat  Eyes: Negative for pain, redness and visual disturbance  Respiratory: Negative for cough, shortness of breath and wheezing  Cardiovascular: Negative for chest pain, palpitations and leg swelling  Gastrointestinal: Negative for abdominal pain, nausea and vomiting  Endocrine: Negative for polydipsia and polyuria  Genitourinary: Negative for difficulty urinating and hematuria  Musculoskeletal: Negative for arthralgias, joint swelling and myalgias  Skin: Negative for rash and wound  Neurological: Positive for numbness  Negative for dizziness and headaches     Psychiatric/Behavioral: Negative for decreased concentration, dysphoric mood and suicidal ideas  The patient is not nervous/anxious  VITALS:  Vitals:    06/24/21 0850   BP: 147/84   Pulse: 66       LABS:  HgA1c:   Lab Results   Component Value Date    HGBA1C 6 2 (H) 02/20/2021     BMP:   Lab Results   Component Value Date    CALCIUM 9 4 02/20/2021    K 4 2 02/20/2021    CO2 26 02/20/2021     02/20/2021    BUN 16 02/20/2021    CREATININE 1 19 02/20/2021       _____________________________________________________  PHYSICAL EXAMINATION:  General: well developed and well nourished, alert, oriented times 3 and appears comfortable  Psychiatric: Normal  HEENT: Normocephalic, Atraumatic Trachea Midline, No torticollis  Pulmonary: No audible wheezing or respiratory distress   Cardiovascular: No pitting edema, 2+ radial pulse   Abdominal/GI: abdomen non tender, non distended   Skin: No masses, erythema, lacerations, fluctation, ulcerations  Neurovascular: Sensation Intact to the Median, Ulnar, Radial Nerve, Motor Intact to the Median, Ulnar, Radial Nerve and Pulses Intact  Musculoskeletal: Normal, except as noted in detailed exam and in HPI  MUSCULOSKELETAL EXAMINATION:    left index finger:  Positive palpable nodule over the A1 pulley  Positive tenderness to palpation over A1 pulley  Positive catching  Positive clicking  Full composite fist      Left Carpal Tunnel Exam:  Negative thenar atrophy  Positive phalen's test  Positive carpal tunnel compression   Negative tinels over median nerve at the wrist       ___________________________________________________  STUDIES REVIEWED:  No new imaging to review           PROCEDURES PERFORMED:  Procedures  No Procedures performed today    _____________________________________________________      Tara Brennan    I,:  Leisa Escobar am acting as a scribe while in the presence of the attending physician :       I:  Booker Reina MD personally performed the services described in this documentation    as scribed in my presence :

## 2021-06-24 NOTE — PROGRESS NOTES
ASSESSMENT/PLAN:      64 y o  male with a right thumb trigger finger, left index trigger finger and left carpal tunnel syndrome  Left index trigger finger release and left open carpal tunnel release was discussed at length including risks and benefits  Risks of surgery consist of but not limited to bleeding, infection, stiffness, injury to nerves and surrounding structures, incomplete resolution of paraesthesia's, pillar pain, etc  Nickyrobbin Fernandes elected to proceed with surgical intervention, left index trigger finger release and left open carpal tunnel release informed surgical consent was signed in the office today  Surgery will planned for November  Due to sleep apnea, surgery will be performed for Garrett  Lab work and EKG will be obtained prior to surgery  He was provided with co-ban to be wrapped around the PIP joint, to avoid locking while cooking  Follow up in the office 10-14 days after surgery for suture removal       The patient verbalized understanding of exam findings and treatment plan  We engaged in the shared decision-making process and treatment options were discussed at length with the patient  Surgical and conservative management discussed today along with risks and benefits  Diagnoses and all orders for this visit:    Trigger thumb of right hand    Left carpal tunnel syndrome    Trigger finger, left index finger      Open Carpal Tunnel Release: The anatomy and physiology of carpal tunnel syndrome was discussed with the patient today  Increase pressure localized under the transverse carpal ligament can cause pain, numbness, tingling, or dysesthesias within the median nerve distribution as well as feelings of fatigue, clumsiness, or awkwardness  These symptoms typically occur at night and worse in the morning upon waking  Eventually, untreated carpal tunnel syndrome can result in weakness and permanent loss of muscle within the thenar compartment of the hand    Treatment options were discussed with the patient  Conservative treatment includes nocturnal resting splints to keep the nerve in a neutral position, ergonomic changes within the work or home environment, activity modification, and tendon gliding exercises  Vitamin B6 one tablet daily over the counter may helpful to reduce symptoms  Steroid injections within the carpal canal can help a majority of patients, however this is often self-limited in a majority of patients  Surgical intervention to divide the transverse carpal ligament typically results in a long-lasting relief of the patient's complaints, with the recurrence rate of less than 1%  The patient has elected to undergo an open carpal tunnel release  The palmar incision technique was discussed in the office with the patient today  In the postoperative period, light activities are allowed immediately, driving is allowed when narcotic medication has stopped, and the bandages may be removed and incision may get wet after 2 days  Heavy activities (lifting more than approximately 10 pounds) will be allowed after follow up appointment in 1-2 weeks  While night symptoms (waking from sleep, pain, and discomfort in the hands) generally improves rapidly, the numbness and tingling as well as the strength will slowly improve over weeks to months depending on the chronicity and severity of the carpal tunnel syndrome  Pillar pain and scar discomfort were discussed with the patient which are self-limiting conditions  The risks of bleeding and infection from the surgery are less than 1%  Risk of recurrence is approximately 0 5%  The risks of nerve injury or nerve damage or damage to the blood vessels is approximately 1 in 1200  The patient has an understanding of the above mentioned discussion  The risks and benefits of the procedure were explained to the patient, which include, but are not limited to: Bleeding, infection, recurrence, pain, scar, damage to tendons, damage to nerves, and damage to blood vessels, failure to give desired results and complications related to anesthesia  These risks, along with alternative conservative treatment options, and postoperative protocols were voiced back and understood by the patient  All questions were answered to the patient's satisfaction  The patient agrees to comply with a standard postoperative protocol, and is willing to proceed  Education was provided via written and auditory forms  There were no barriers to learning  Written handouts regarding wound care, incision and scar care, and general preoperative information was provided to the patient  Prior to surgery, the patient may be requested to stop all anti-inflammatory medications  Prophylactic aspirin, Plavix, and Coumadin may be allowed to be continued  Medications including vitamin E , ginkgo, and fish oil are requested to be stopped approximately one week prior to surgery  Trigger Finger Release: The anatomy and physiology of trigger finger was discussed with the patient today in the office  Edema and increased contact pressure within the flexor tendons at the A1 pulley can cause pain, crepitation, and limitation of function  Treatment options include resting MP blocking splints to decrease edema, oral anti-inflammatory medications, home or formal therapy exercises, up to 2 steroid injections or surgical release  While majority of patients do respond to conservative treatment, up to 20% may require surgical release  The patient has elected release of the trigger finger  The patient has elected to undergo a release of the A1 pulley (trigger finger)  A small incision will be made over the palmar aspect of the hand, the tendon sheath holding the flexor tendons will be released  In the postoperative period, light activities are allowed immediately, driving is allowed when narcotic medication has stopped, and the incision may get wet after 2 days    Heavy activities (lifting more than approximately 10 pounds) will be allowed after the follow up appointment in 1-2 weeks  While the pain and discomfort within the wrist typically improves rapidly, some residual discomfort may be present for up to 6 weeks  The nodule that is typically palpable in the palmar aspect of the hand will not be removed, as this would necessitate removal of a portion of the flexor tendon, however the catching, clicking, and locking should resolve  Approximate success rate is 98%  The risks and benefits of the procedure were explained to the patient, which include, but are not limited to: Bleeding, infection, recurrence, pain, scar, damage to tendons, damage to nerves, and damage to blood vessels, need for future surgery and complications related to anesthesia  If bony work is done, risks also include malunion and nonunion  These risks, along with alternative conservative treatment options, and postoperative protocols were voiced back and understood by the patient  All questions were answered to the patient's satisfaction  The patient agrees to comply with a standard postoperative protocol, and is willing to proceed  Education was provided via written and auditory forms  There were no barriers to learning  Written handouts regarding wound care, incision and scar care, and general preoperative information, as well as risks and benefits were provided to the patient  Follow Up:  No follow-ups on file  To Do Next Visit:  Re-evaluation of current issue and Sutures out    ____________________________________________________________________________________________________________________________________________      CHIEF COMPLAINT:  Chief Complaint   Patient presents with    Right Hand - Follow-up    Left Hand - Pain, Follow-up, Numbness       SUBJECTIVE:  Yue Beltran is a 64y o  year old RHD male who presents to the office today for a follow up regarding trigger fingers and carpal tunnel syndrome   Ernst Tiwari was last seen in the office aprox  6 weeks ago, at which time he underwent a right thumb and left index trigger finger CSI  Bernabe Renae states that his right thumb has mostly resolved after the trigger finger CSI  He states that his left index finger is still clicking, locking and catching  He also notes continued numbness/tingling to his left hand despite being more consistent with nighttime bracing  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        I have personally reviewed all the relevant PMH, PSH, SH, FH, Medications and allergies       PAST MEDICAL HISTORY:  Past Medical History:   Diagnosis Date    Anxiety     GERD (gastroesophageal reflux disease)     Joint pain in both hands 2/19/2021    Kidney stone     Kidney stones 2/19/2021    Mass of anterior abdominal wall 2/19/2021    LLQ 9x6 cm    [facility] infarction) [facility] [address] [facility]     Morbid obesity with BMI of 50 0-59 9, adult ([facility] [address] )     Nonimmune to hepatitis B virus 3/4/2021    Parotid gland enlargement 2/19/2021    left    Prediabetes 3/4/2021    Sarcoidosis 2/19/2021    Sleep apnea     cpap    Soft tissue mass 2/19/2021    R upper back 9x 11x3cm L upper back 14x20x 4 cm       PAST SURGICAL HISTORY:  Past Surgical History:   Procedure Laterality Date    BRONCHOSCOPY      COLONOSCOPY      EGD      HAND SURGERY Bilateral 2019    trigger finger and carpal tunnel     KNEE ARTHROSCOPY      meniscus surgery    [place] EXCISION TUMOR SOFT TISSUE BACK/FLANK SUBQ 3+CM N/A 4/8/2021    Procedure: excision large mass back subfascial  25x 13x 3;  Surgeon: Bertha Dickson MD;  Location:  MAIN OR;  Service: General    VASECTOMY         FAMILY HISTORY:  Family History   Problem Relation Age of Onset    Heart disease Mother     Diabetes Mother     Heart disease Father     Cancer Father         blood cancer    Heart disease Paternal Aunt     Heart disease Paternal Uncle     Diabetes Maternal Grandfather     Stroke Neg Hx        SOCIAL HISTORY:  Social History     Tobacco Use    Smoking status: Never Smoker    Smokeless tobacco: Never Used   Vaping Use    Vaping Use: Never used   Substance Use Topics    Alcohol use: No    Drug use: No       MEDICATIONS:    Current Outpatient Medications:     Ascorbic Acid (vitamin C) 100 MG tablet, Take 100 mg by mouth daily, Disp: , Rfl:     aspirin (Aspirin 81) 81 mg EC tablet, Take 1 tablet (81 mg total) by mouth daily You may restart your daily aspirin on Sunday April 11th , Disp: , Rfl: 0    atorvastatin (LIPITOR) 20 mg tablet, Take 20 mg by mouth daily, Disp: , Rfl:     Cholecalciferol (VITAMIN D PO), Take 10,000 Units by mouth every other day , Disp: , Rfl:     escitalopram (LEXAPRO) 10 mg tablet, daily , Disp: , Rfl:     Multiple Vitamins-Minerals (multivitamin with minerals) tablet, Take 1 tablet by mouth daily, Disp: , Rfl:     pantoprazole (PROTONIX) 40 mg, Take 40 mg by mouth daily Protonix 40 MG Oral Packet  Refills: 0  Active , Disp: , Rfl:     ALLERGIES:  Allergies   Allergen Reactions    Azithromycin Abdominal Pain    Erythromycin Abdominal Pain       REVIEW OF SYSTEMS:  Review of Systems   Constitutional: Negative for chills, fever and unexpected weight change  HENT: Negative for hearing loss, nosebleeds and sore throat  Eyes: Negative for pain, redness and visual disturbance  Respiratory: Negative for cough, shortness of breath and wheezing  Cardiovascular: Negative for chest pain, palpitations and leg swelling  Gastrointestinal: Negative for abdominal pain, nausea and vomiting  Endocrine: Negative for polydipsia and polyuria  Genitourinary: Negative for difficulty urinating and hematuria  Musculoskeletal: Negative for arthralgias, joint swelling and myalgias  Skin: Negative for rash and wound  Neurological: Positive for numbness  Negative for dizziness and headaches     Psychiatric/Behavioral: Negative for decreased concentration, dysphoric mood and suicidal ideas  The patient is not nervous/anxious  VITALS:  Vitals:    06/24/21 0850   BP: 147/84   Pulse: 66       LABS:  HgA1c:   Lab Results   Component Value Date    HGBA1C 6 2 (H) 02/20/2021     BMP:   Lab Results   Component Value Date    CALCIUM 9 4 02/20/2021    K 4 2 02/20/2021    CO2 26 02/20/2021     02/20/2021    BUN 16 02/20/2021    CREATININE 1 19 02/20/2021       _____________________________________________________  PHYSICAL EXAMINATION:  General: well developed and well nourished, alert, oriented times 3 and appears comfortable  Psychiatric: Normal  HEENT: Normocephalic, Atraumatic Trachea Midline, No torticollis  Pulmonary: No audible wheezing or respiratory distress   Cardiovascular: No pitting edema, 2+ radial pulse   Abdominal/GI: abdomen non tender, non distended   Skin: No masses, erythema, lacerations, fluctation, ulcerations  Neurovascular: Sensation Intact to the Median, Ulnar, Radial Nerve, Motor Intact to the Median, Ulnar, Radial Nerve and Pulses Intact  Musculoskeletal: Normal, except as noted in detailed exam and in HPI  MUSCULOSKELETAL EXAMINATION:    left index finger:  Positive palpable nodule over the A1 pulley  Positive tenderness to palpation over A1 pulley  Positive catching  Positive clicking  Full composite fist      Left Carpal Tunnel Exam:  Negative thenar atrophy  Positive phalen's test  Positive carpal tunnel compression   Negative tinels over median nerve at the wrist       ___________________________________________________  STUDIES REVIEWED:  No new imaging to review           PROCEDURES PERFORMED:  Procedures  No Procedures performed today    _____________________________________________________      Jose Ramon Gutierrez    I,:  Coreen Escobar am acting as a scribe while in the presence of the attending physician :       I,:  Liza Leydi, MD personally performed the services described in this documentation    as scribed in my presence :

## 2021-06-30 ENCOUNTER — TELEPHONE (OUTPATIENT)
Dept: SURGERY | Facility: CLINIC | Age: 56
End: 2021-06-30

## 2021-06-30 NOTE — TELEPHONE ENCOUNTER
Spoke to patient and reviewed CPAP recall with him and answered all questions  Scheduled with Dr Emelyn Song for tomorrow

## 2021-06-30 NOTE — TELEPHONE ENCOUNTER
Pravin called and said that his CPAP is being recalled  He called [facility] who he got the machine from and they told him they can't help him, to call the   They told him to continue to use the machine and to reach out to the doctor's office  He would prefer to see a different pulmonologist here in the office  Can you call and speak to him about the machine and seeing maybe, Dr Emelyn Song?     Thanks

## 2021-07-01 ENCOUNTER — OFFICE VISIT (OUTPATIENT)
Dept: PULMONOLOGY | Facility: CLINIC | Age: 56
End: 2021-07-01
Payer: COMMERCIAL

## 2021-07-01 VITALS
HEIGHT: 64 IN | SYSTOLIC BLOOD PRESSURE: 144 MMHG | WEIGHT: 285.8 LBS | BODY MASS INDEX: 48.79 KG/M2 | TEMPERATURE: 97.7 F | OXYGEN SATURATION: 98 % | HEART RATE: 81 BPM | DIASTOLIC BLOOD PRESSURE: 82 MMHG

## 2021-07-01 DIAGNOSIS — R06.02 SHORTNESS OF BREATH: Primary | ICD-10-CM

## 2021-07-01 DIAGNOSIS — D86.9 SARCOIDOSIS: ICD-10-CM

## 2021-07-01 DIAGNOSIS — E66.01 MORBID OBESITY WITH BMI OF 50.0-59.9, ADULT (HCC): ICD-10-CM

## 2021-07-01 DIAGNOSIS — G47.33 OBSTRUCTIVE SLEEP APNEA SYNDROME: ICD-10-CM

## 2021-07-01 PROCEDURE — 99214 OFFICE O/P EST MOD 30 MIN: CPT | Performed by: INTERNAL MEDICINE

## 2021-07-01 PROCEDURE — 1036F TOBACCO NON-USER: CPT | Performed by: INTERNAL MEDICINE

## 2021-07-01 NOTE — PROGRESS NOTES
Pulmonary Follow Up Note   Silas Delarosa 64 y o  male MRN: [id number]  7/1/2021      Assessment/Plan:    Shortness of breath  · Overall I suspect a large part of Rachelle Muñiz is intermittent shortness of breath is related to his weight, his BMI is 49 and we explained that this, can most certainly cause a component of deconditioning  ·  will obtain function testing to assess for obstructive airway disease and also will obtain pulmonary function testing with bronchodilator response to assess for any evidence of reactive airway disease  · Did undergo excision of a soft tissue mass on his back in early April with pathology consistent with Lipoma  · Will obtain follow up CT imaging now    Obstructive sleep apnea syndrome  · Has had diagnosis of EULALIO for several years and has been compliant with CPAP therapy  · Will obtain compliance data for review    Morbid obesity with BMI of 50 0-59 9, adult ([facility] )  · This is a significant part of Rachelle Muñiz' symptoms of shortness of breath and deconditioning  He will ikely have restrictive picture on PFTs secondary to his weight  · Discussed the importance of weight and the risks of obesity, he understands and will be making efforts towards weight loss    Sarcoidosis  · Patient has a distant history of diagnosis of pulmonary sarcoidosis  · Patient says that he  had bronchoscopy approximately 20 years ago with lymph node biopsies and transbronchial biopsies presumably demonstrating granulomas  · We do not have these biopsies for review  · Most recent chest imaging does not demonstrate any mediastinal adenopathy nor parenchymal infiltrates    · Suspect that his current pulmonary symptoms are related to his weight  · No specific sarcoidosis treatment indicated at this time    Health Maintenance  Immunization History   Administered Date(s) Administered    Hep A, adult 03/08/2021    Hep B, adult 03/08/2021    INFLUENZA 01/17/2013, 09/09/2013, 09/14/2020    Influenza Quadrivalent 3 years and older 09/14/2020    MMR 03/08/2021    Pneumococcal Polysaccharide PPV23 07/29/2014, 02/19/2021    SARS-CoV-2 / COVID-19 mRNA IM ([employer]) 05/22/2021, 06/16/2021    Td (adult), Unspecified 12/01/2006    Tuberculin Skin Test-PPD Intradermal 08/07/2018       No follow-ups on file  History of Present Illness   HPI:  Surinder Jacobs is a 64 y o  male with a PMHx of GERD, EULALIO on CPAP, sarcoidosis, soft tissue mass on the back who presents today for follow up  He was last seen by Dr Suzan Mcdaniel in early March  He did undergo excision of a soft tissue back mass which pathology showed lipoma  He also  does have an underlying history of obstructive sleep apnea and asthma on CPAP therapy for several years  He does use a dough machine and states that his machine was part of the recall  He says the has not used the [employer] cleaning product  In terms of his breathing, he says that he overall is able to do what he needs to do without limitation but does feel short of breath at times  He has intermittently heard some "noisy" breathing but no significant wheezing  Review of Systems   Constitutional: Negative for activity change, chills and fever  HENT: Negative for congestion, rhinorrhea, sneezing and voice change  Respiratory: Negative for cough, shortness of breath and wheezing  Cardiovascular: Negative for chest pain and palpitations  Gastrointestinal: Negative for abdominal distention, abdominal pain, diarrhea, nausea and vomiting  Endocrine: Negative for cold intolerance and heat intolerance  Musculoskeletal: Negative for arthralgias, back pain, myalgias and neck stiffness  Skin: Negative for color change, pallor and rash  Neurological: Negative for dizziness, weakness and numbness  Psychiatric/Behavioral: Negative for agitation  The patient is not nervous/anxious          Historical Information   Past Medical History:   Diagnosis Date    Anxiety     GERD (gastroesophageal reflux disease)     Joint pain in both hands 2/19/2021    Kidney stone     Kidney stones 2/19/2021    Mass of anterior abdominal wall 2/19/2021    LLQ 9x6 cm    MI [facility] infarction) [facility] [address] [facility]     Morbid obesity with BMI of 50 0-59 9, adult ([facility] [address] )     Nonimmune to hepatitis B virus 3/4/2021    Parotid gland enlargement 2/19/2021    left    Prediabetes 3/4/2021    Sarcoidosis 2/19/2021    Sleep apnea     cpap    Soft tissue mass 2/19/2021    R upper back 9x 11x3cm L upper back 14x20x 4 cm     Past Surgical History:   Procedure Laterality Date    BRONCHOSCOPY      COLONOSCOPY      EGD      HAND SURGERY Bilateral 2019    trigger finger and carpal tunnel     KNEE ARTHROSCOPY      meniscus surgery    [place] EXCISION TUMOR SOFT TISSUE BACK/FLANK SUBQ 3+CM N/A 4/8/2021    Procedure: excision large mass back subfascial  25x 13x 3;  Surgeon: Ghislaine Abreu MD;  Location:  MAIN OR;  Service: General    VASECTOMY       Family History   Problem Relation Age of Onset    Heart disease Mother     Diabetes Mother     Heart disease Father     Cancer Father         blood cancer    Heart disease Paternal Aunt     Heart disease Paternal Uncle     Diabetes Maternal Grandfather     Stroke Neg Hx          Meds/Allergies     Current Outpatient Medications:     Ascorbic Acid (vitamin C) 100 MG tablet, Take 100 mg by mouth daily, Disp: , Rfl:     atorvastatin (LIPITOR) 20 mg tablet, Take 20 mg by mouth daily, Disp: , Rfl:     Cholecalciferol (VITAMIN D PO), Take 10,000 Units by mouth every other day , Disp: , Rfl:     escitalopram (LEXAPRO) 10 mg tablet, daily , Disp: , Rfl:     Multiple Vitamins-Minerals (multivitamin with minerals) tablet, Take 1 tablet by mouth daily, Disp: , Rfl:     pantoprazole (PROTONIX) 40 mg, Take 40 mg by mouth daily Protonix 40 MG Oral Packet  Refills: 0  Active , Disp: , Rfl:     aspirin (Aspirin 81) 81 mg EC tablet, Take 1 tablet (81 mg total) by mouth daily You may restart your daily aspirin on Sunday April 11th  (Patient not taking: Reported on 7/1/2021), Disp: , Rfl: 0  Allergies   Allergen Reactions    Azithromycin Abdominal Pain    Erythromycin Abdominal Pain       Vitals: Blood pressure 144/82, pulse 81, temperature 97 7 °F (36 5 °C), height 5' 4" (1 626 m), weight 130 kg (285 lb 12 8 oz), SpO2 98 %  Body mass index is 49 06 kg/m²  Oxygen Therapy  SpO2: 98 %    Physical Exam  Constitutional:       Appearance: Normal appearance  HENT:      Head: Normocephalic and atraumatic  Nose: Nose normal       Mouth/Throat:      Mouth: Mucous membranes are moist       Pharynx: Oropharynx is clear  Cardiovascular:      Rate and Rhythm: Normal rate and regular rhythm  Pulses: Normal pulses  Heart sounds: Normal heart sounds  No murmur heard  Pulmonary:      Effort: Pulmonary effort is normal  No respiratory distress  Breath sounds: Normal breath sounds  No wheezing  Abdominal:      General: Abdomen is flat  There is no distension  Palpations: Abdomen is soft  Tenderness: There is no abdominal tenderness  Musculoskeletal:         General: No swelling or tenderness  Cervical back: Normal range of motion and neck supple  Right lower leg: No edema  Left lower leg: No edema  Skin:     General: Skin is warm and dry  Findings: No rash  Neurological:      General: No focal deficit present  Mental Status: He is alert and oriented to person, place, and time  Psychiatric:         Mood and Affect: Mood normal          Behavior: Behavior normal          Labs: I have personally reviewed pertinent lab results    Lab Results   Component Value Date    WBC 5 48 02/20/2021    HGB 15 3 02/20/2021    HCT 46 9 02/20/2021    MCV 88 02/20/2021     02/20/2021     Lab Results   Component Value Date    CALCIUM 9 4 02/20/2021    K 4 2 02/20/2021    CO2 26 02/20/2021     02/20/2021    BUN 16 02/20/2021    CREATININE 1 19 02/20/2021     No results found for: IGE  Lab Results   Component Value Date    ALT 36 02/20/2021    AST 20 02/20/2021    ALKPHOS 92 02/20/2021         Imaging and other studies: I have personally reviewed pertinent reports  and I have personally reviewed pertinent films in PACS    CT chest/abdomen/pelvis:  Large fatty lesion in the posterior chest wall is superficial location but quite large measuring 18 cm in size  A large lipoma is most likely, although given the size, well-differentiated liposarcoma is not excluded  This should be correlated with any   new interval growth clinically  Endocrine evaluation for any evidence of glucocorticoid excess may be useful      Mild asymmetric fat in the left lower quadrant of the abdomen is seen but without circumscribed margins  There are some areas of fat necrosis identified one area is slightly larger than 2017 with some calcifications measuring 3 cm  This could be   followed up with imaging or clinical exam to assess for stability      Increased opacification of the right middle lobe medial segment may suggest some subsegmental atelectasis versus scarring  There is slight narrowing of the visualized medial bronchus and may suggest a possible middle lobe syndrome which has slightly   progressed since an abdomen CT  This could be sequela of prior pneumonia  No obvious central adenopathy or mass can be identified  Pulmonary evaluation is suggested  3 month follow-up may be useful to see if this resolves      This was discussed with Dr Sandee Pham      Pulmonary function testing:   Pending      EKG, Pathology, and Other Studies: I have personally reviewed pertinent reports  and I have personally reviewed pertinent films in PACS      [facility]OB[facility]

## 2021-07-06 ENCOUNTER — TELEPHONE (OUTPATIENT)
Dept: FAMILY MEDICINE CLINIC | Facility: CLINIC | Age: 56
End: 2021-07-06

## 2021-07-06 PROBLEM — K11.8 NODULE OF PAROTID GLAND: Status: ACTIVE | Noted: 2021-07-06

## 2021-07-06 NOTE — TELEPHONE ENCOUNTER
Pt called and has an appt to see you on 7/15, however is asking to see you sooner  He states that he is still having a lot pain in his lower right rib cage and really only wants to talk with you about it  You are quite booked going out till then, is there a spot that you would be able to fit him into?

## 2021-07-15 ENCOUNTER — OFFICE VISIT (OUTPATIENT)
Dept: FAMILY MEDICINE CLINIC | Facility: CLINIC | Age: 56
End: 2021-07-15
Payer: COMMERCIAL

## 2021-07-15 VITALS
TEMPERATURE: 98.6 F | OXYGEN SATURATION: 98 % | HEART RATE: 84 BPM | DIASTOLIC BLOOD PRESSURE: 74 MMHG | WEIGHT: 283 LBS | HEIGHT: 64 IN | BODY MASS INDEX: 48.32 KG/M2 | SYSTOLIC BLOOD PRESSURE: 122 MMHG

## 2021-07-15 DIAGNOSIS — E66.01 MORBID OBESITY WITH BMI OF 45.0-49.9, ADULT (HCC): ICD-10-CM

## 2021-07-15 DIAGNOSIS — M17.11 OSTEOARTHRITIS OF RIGHT KNEE, UNSPECIFIED OSTEOARTHRITIS TYPE: ICD-10-CM

## 2021-07-15 DIAGNOSIS — Z23 NEED FOR MMR VACCINE: ICD-10-CM

## 2021-07-15 DIAGNOSIS — M17.12 PRIMARY OSTEOARTHRITIS OF LEFT KNEE: ICD-10-CM

## 2021-07-15 DIAGNOSIS — R10.11 RUQ PAIN: Primary | ICD-10-CM

## 2021-07-15 DIAGNOSIS — Z78.9 NONIMMUNE TO HEPATITIS B VIRUS: ICD-10-CM

## 2021-07-15 DIAGNOSIS — Z28.39 IMMUNIZATION DEFICIENCY: ICD-10-CM

## 2021-07-15 DIAGNOSIS — Z23 NEED FOR HEPATITIS B VACCINATION: ICD-10-CM

## 2021-07-15 PROCEDURE — 90471 IMMUNIZATION ADMIN: CPT

## 2021-07-15 PROCEDURE — 99215 OFFICE O/P EST HI 40 MIN: CPT | Performed by: FAMILY MEDICINE

## 2021-07-15 PROCEDURE — 90472 IMMUNIZATION ADMIN EACH ADD: CPT

## 2021-07-15 PROCEDURE — 3008F BODY MASS INDEX DOCD: CPT | Performed by: FAMILY MEDICINE

## 2021-07-15 PROCEDURE — 90746 HEPB VACCINE 3 DOSE ADULT IM: CPT

## 2021-07-15 PROCEDURE — 90707 MMR VACCINE SC: CPT

## 2021-07-15 PROCEDURE — 1036F TOBACCO NON-USER: CPT | Performed by: FAMILY MEDICINE

## 2021-07-15 NOTE — PROGRESS NOTES
Assessment/Plan:    Concern for right upper quadrant pain will need stat ultrasound to rule out gallbladder issues  Will need liver function study amylase lipase  Also will check D-dimer since he had recent major surgery 3 months ago if elevated will need to proceed to look for blood clot in the lungs  Second hep B and  2nd MMR vaccine given to him today  Next visit he will need Tdap and Shingrix vaccine  Third hepatitis-B vaccine will be done in 6 months from now  I have spent 50 minutes with Patient  today in which greater than 50% of this time was spent in counseling/coordination of care regarding Prognosis, Risks and benefits of tx options and Intructions for management  Problem List Items Addressed This Visit        Musculoskeletal and Integument    Osteoarthritis of right knee    Relevant Orders    Knee Immobilizer    Primary osteoarthritis of left knee    Relevant Orders    Knee Immobilizer       Other    Morbid obesity with BMI of 45 0-49 9, adult (HCC)    Nonimmune to hepatitis B virus    Immunization deficiency    RUQ pain - Primary    Relevant Orders    CBC and differential    Comprehensive metabolic panel    D-dimer, quantitative    Amylase    Lipase    US abdomen complete    Need for MMR vaccine    Relevant Orders    MMR VACCINE SQ (Completed)    Need for hepatitis B vaccination    Relevant Orders    HEPATITIS B VACCINE ADULT IM (Completed)            Subjective:      Patient ID: Surinder Jacobs is a 64 y o  male  44-year-old male presenting complain of several week history of right quadrant pain  Sharp stabbing pain constant  Hurt worse when he takes a deep breath radiate to the left upper quadrant  No nausea no vomiting  Nothing seemed to help  He denies any trauma  Three months ago he underwent large lipoma resection on his mid upper back  Biopsies mature lipoma tissues  He recently saw ENT for nodule in his throat advised for monitor    He follow-up with pulmonologist for sleep apnea  His blood pressure is much improved today  He is on Lipitor Lexapro Protonix  He takes Protonix daily but he still gets significant breakthrough gastritis  He is not immune to hep B MMR infection  He needs 2nd MMR and 2nd hep B vaccine today  His uric acid level was elevated last the 6 9  He complained of bilateral knee osteoarthritis  He follow-up with Orthopedic and had multiple cortisone injection with no improvement  He would like to be brace for both knees he try them on and that helped him tremendously  Of note CT scan chest abdomen pelvis was done 4 months ago that was on normal       The following portions of the patient's history were reviewed and updated as appropriate:   Past Medical History:  He has a past medical history of Anxiety, GERD (gastroesophageal reflux disease), Joint pain in both hands (2/19/2021), Kidney stone, Kidney stones (2/19/2021), Mass of anterior abdominal wall (2/19/2021), MI (myocardial infarction) ([facility] [address] ), Morbid obesity with BMI of 50 0-59 9, adult ([facility] [address] ), Need for hepatitis B vaccination (7/15/2021), Need for MMR vaccine (7/15/2021), Nonimmune to hepatitis B virus (3/4/2021), Parotid gland enlargement (2/19/2021), Prediabetes (3/4/2021), Primary osteoarthritis of left knee (7/15/2021), RUQ pain (7/15/2021), Sarcoidosis (2/19/2021), Sleep apnea, and Soft tissue mass (2/19/2021)  ,  _______________________________________________________________________  Medical Problems:  does not have any pertinent problems on file ,  _______________________________________________________________________  Past Surgical History:   has a past surgical history that includes Knee arthroscopy; Hand surgery (Bilateral, 2019); Vasectomy; Colonoscopy; Bronchoscopy; EGD; and pr excision tumor soft tissue back/flank subq 3+cm (N/A, 4/8/2021)  ,  _______________________________________________________________________  Family History:  family history includes Cancer in his father; Diabetes in his maternal grandfather and mother; Heart disease in his father, mother, paternal aunt, and paternal uncle ,  _______________________________________________________________________  Social History:   reports that he has never smoked  He has never used smokeless tobacco  He reports that he does not drink alcohol and does not use drugs  ,  _______________________________________________________________________  Allergies:  is allergic to azithromycin and erythromycin     _______________________________________________________________________  Current Outpatient Medications   Medication Sig Dispense Refill    Ascorbic Acid (vitamin C) 100 MG tablet Take 100 mg by mouth daily      atorvastatin (LIPITOR) 20 mg tablet Take 20 mg by mouth daily      Cholecalciferol (VITAMIN D PO) Take 10,000 Units by mouth every other day       escitalopram (LEXAPRO) 10 mg tablet daily       Multiple Vitamins-Minerals (multivitamin with minerals) tablet Take 1 tablet by mouth daily      pantoprazole (PROTONIX) 40 mg Take 40 mg by mouth daily Protonix 40 MG Oral Packet  Refills: 0  Active       aspirin (Aspirin 81) 81 mg EC tablet Take 1 tablet (81 mg total) by mouth daily You may restart your daily aspirin on Sunday April 11th  (Patient not taking: Reported on 7/1/2021)  0     No current facility-administered medications for this visit      _______________________________________________________________________  Review of Systems   Constitutional: Negative for activity change, appetite change, fatigue, fever and unexpected weight change  HENT: Negative for dental problem and trouble swallowing  Eyes: Negative for photophobia and visual disturbance  Respiratory: Negative for cough and chest tightness  Cardiovascular: Negative for chest pain, palpitations and leg swelling  Gastrointestinal: Positive for abdominal pain  Negative for constipation and vomiting     Endocrine: Negative for cold intolerance, polydipsia and polyuria  Genitourinary: Negative for difficulty urinating, frequency and urgency  Musculoskeletal: Negative for arthralgias, joint swelling, myalgias and neck pain  Skin: Negative for color change, rash and wound  Allergic/Immunologic: Negative for environmental allergies  Neurological: Negative for dizziness, weakness and numbness  Hematological: Does not bruise/bleed easily  Psychiatric/Behavioral: Negative for decreased concentration, dysphoric mood, self-injury, sleep disturbance and suicidal ideas  Objective:  Vitals:    07/15/21 1138   BP: 122/74   BP Location: Left arm   Patient Position: Sitting   Cuff Size: Standard   Pulse: 84   Temp: 98 6 °F (37 °C)   TempSrc: Tympanic   SpO2: 98%   Weight: 128 kg (283 lb)   Height: 5' 4" (1 626 m)     Body mass index is 48 58 kg/m²  Physical Exam  Vitals and nursing note reviewed  Constitutional:       Appearance: He is well-developed  He is obese  HENT:      Head: Normocephalic and atraumatic  Right Ear: Tympanic membrane, ear canal and external ear normal       Left Ear: Tympanic membrane, ear canal and external ear normal    Eyes:      Pupils: Pupils are equal, round, and reactive to light  Cardiovascular:      Rate and Rhythm: Normal rate and regular rhythm  Pulses: Normal pulses  Heart sounds: Normal heart sounds  Pulmonary:      Effort: Pulmonary effort is normal       Breath sounds: Normal breath sounds  Abdominal:      General: Bowel sounds are normal       Palpations: Abdomen is soft  Tenderness: There is abdominal tenderness  Comments: Tender on palpation right upper quadrant pain tender reproducible  No guarding mild distension  Musculoskeletal:         General: Normal range of motion  Cervical back: Normal range of motion and neck supple  Skin:     General: Skin is warm and dry  Neurological:      Mental Status: He is alert and oriented to person, place, and time  Psychiatric:         Behavior: Behavior normal          Thought Content:  Thought content normal          Judgment: Judgment normal

## 2021-07-16 ENCOUNTER — TELEPHONE (OUTPATIENT)
Dept: PULMONOLOGY | Facility: CLINIC | Age: 56
End: 2021-07-16

## 2021-07-19 ENCOUNTER — OFFICE VISIT (OUTPATIENT)
Dept: SURGERY | Facility: CLINIC | Age: 56
End: 2021-07-19

## 2021-07-19 VITALS
HEIGHT: 64 IN | TEMPERATURE: 98.1 F | SYSTOLIC BLOOD PRESSURE: 120 MMHG | DIASTOLIC BLOOD PRESSURE: 70 MMHG | BODY MASS INDEX: 48.83 KG/M2 | HEART RATE: 78 BPM | RESPIRATION RATE: 18 BRPM | WEIGHT: 286 LBS

## 2021-07-19 DIAGNOSIS — M25.512 LEFT SHOULDER PAIN: Primary | ICD-10-CM

## 2021-07-19 PROCEDURE — 3008F BODY MASS INDEX DOCD: CPT | Performed by: FAMILY MEDICINE

## 2021-07-19 PROCEDURE — 99024 POSTOP FOLLOW-UP VISIT: CPT | Performed by: SURGERY

## 2021-07-19 NOTE — TELEPHONE ENCOUNTER
I have tried to call Darvin Esquivel multiple times with no answer, can we please set up a Peer to Peer to get the CT approved  Thank you!

## 2021-07-19 NOTE — PROGRESS NOTES
Assessment/Plan:  I told him that he the incision site is completely healed and a seroma which she had in the past has completely resolved  I do feel however that this pain may be due to dissection around left shoulder muscles  I am sending him for physical therapy for the same  Follow-up with me p r n  No problem-specific Assessment & Plan notes found for this encounter  Diagnoses and all orders for this visit:    Left shoulder pain  -     Ambulatory referral to Physical Therapy; Future          Subjective:      Patient ID: Nadya Loera is a 64 y o  male  63-year-old who is approximately 3 months status was excision of a large mass from his back  He says he occasionally feels some pain in the back around left shoulder blade  No known fever  No complaints of any swelling   In that area  The following portions of the patient's history were reviewed and updated as appropriate: allergies, current medications, past family history, past medical history, past social history, past surgical history and problem list     Review of Systems   All other systems reviewed and are negative  Objective:      /70 (BP Location: Left arm, Patient Position: Sitting, Cuff Size: Adult)   Pulse 78   Temp 98 1 °F (36 7 °C)   Resp 18   Ht 5' 4" (1 626 m)   Wt 130 kg (286 lb)   BMI 49 09 kg/m²          Physical Exam  Vitals reviewed  Constitutional:       Appearance: He is obese  Musculoskeletal:      Comments: His incision is healthy  There is no palpable swelling a seromas  He has no tenderness  Neurological:      Mental Status: He is alert

## 2021-07-21 NOTE — TELEPHONE ENCOUNTER
P2P completed  We have changed the study to a IV contrast study which Leonardo Alex has confirmed  Spoke with insurance and study has been approved      Authorization Number: [id number]

## 2021-07-21 NOTE — TELEPHONE ENCOUNTER
Order switched to CT with contrast, appointment for tomorrow was switched already with central scheduling  Dr Jerardo Fernandez scheduled for peer 2 peer at noon today for auth

## 2021-07-22 ENCOUNTER — HOSPITAL ENCOUNTER (OUTPATIENT)
Dept: CT IMAGING | Facility: HOSPITAL | Age: 56
Discharge: HOME | End: 2021-07-22
Attending: INTERNAL MEDICINE
Payer: COMMERCIAL

## 2021-07-22 ENCOUNTER — HOSPITAL ENCOUNTER (OUTPATIENT)
Dept: PULMONOLOGY | Facility: HOSPITAL | Age: 56
Discharge: HOME | End: 2021-07-22
Attending: INTERNAL MEDICINE
Payer: COMMERCIAL

## 2021-07-22 ENCOUNTER — APPOINTMENT (OUTPATIENT)
Dept: CT IMAGING | Facility: HOSPITAL | Age: 56
End: 2021-07-22
Attending: INTERNAL MEDICINE
Payer: COMMERCIAL

## 2021-07-22 DIAGNOSIS — R06.02 SHORTNESS OF BREATH: ICD-10-CM

## 2021-07-22 DIAGNOSIS — R93.89 ABNORMAL CHEST CT: ICD-10-CM

## 2021-07-22 PROCEDURE — 94726 PLETHYSMOGRAPHY LUNG VOLUMES: CPT

## 2021-07-22 PROCEDURE — 94729 DIFFUSING CAPACITY: CPT | Performed by: INTERNAL MEDICINE

## 2021-07-22 PROCEDURE — G1004 CDSM NDSC: HCPCS

## 2021-07-22 PROCEDURE — 94060 EVALUATION OF WHEEZING: CPT | Performed by: INTERNAL MEDICINE

## 2021-07-22 PROCEDURE — 94726 PLETHYSMOGRAPHY LUNG VOLUMES: CPT | Performed by: INTERNAL MEDICINE

## 2021-07-22 PROCEDURE — 94760 N-INVAS EAR/PLS OXIMETRY 1: CPT

## 2021-07-22 PROCEDURE — 94060 EVALUATION OF WHEEZING: CPT

## 2021-07-22 PROCEDURE — 94729 DIFFUSING CAPACITY: CPT

## 2021-07-22 PROCEDURE — 71260 CT THORAX DX C+: CPT

## 2021-07-22 RX ORDER — ALBUTEROL SULFATE 2.5 MG/3ML
2.5 SOLUTION RESPIRATORY (INHALATION) ONCE
Status: COMPLETED | OUTPATIENT
Start: 2021-07-22 | End: 2021-07-22

## 2021-07-22 RX ADMIN — IOHEXOL 100 ML: 350 INJECTION, SOLUTION INTRAVENOUS at 13:59

## 2021-07-22 RX ADMIN — ALBUTEROL SULFATE 2.5 MG: 2.5 SOLUTION RESPIRATORY (INHALATION) at 14:33

## 2021-07-23 ENCOUNTER — TELEPHONE (OUTPATIENT)
Dept: FAMILY MEDICINE CLINIC | Facility: CLINIC | Age: 56
End: 2021-07-23

## 2021-07-23 NOTE — TELEPHONE ENCOUNTER
Sometimes the vaccines go into the muscles and can cause pain   Please advised pt to put ice/heat alternate, keep moving the arm, it should go away after 2 weeks

## 2021-07-23 NOTE — TELEPHONE ENCOUNTER
Pt received 2 shots at last visit MMR and Hep B - pt states, ever since the vaccines were administered pt is having pain in the left arm up the shoulder - could this be a side effect from the shot?

## 2021-07-29 ENCOUNTER — TELEPHONE (OUTPATIENT)
Dept: PULMONOLOGY | Facility: CLINIC | Age: 56
End: 2021-07-29

## 2021-07-30 ENCOUNTER — TELEPHONE (OUTPATIENT)
Dept: PULMONOLOGY | Facility: CLINIC | Age: 56
End: 2021-07-30

## 2021-07-30 NOTE — TELEPHONE ENCOUNTER
Aquiels Toribio has called the office wishing to speak to Nicholas Barnes  Informed Danyel, she is currently not in the office  Danyel then wished to speak to Eugenio  Informed Perry Reis she was currently rooming a patient  Offered to relay whatever message they have to both, but Perry Reis stated he would rather speak to them himself  Patient : Michelle Wiley Age: 16 year old Sex: female   MRN: [id number] Encounter Date: 6/27/2020      History     Chief Complaint   Patient presents with   • Vomiting     HPI  The patient is a 60-year-old female who presents the ED with nausea and vomiting.  She states this began when she got an IM injection of Rocephin on 06/25/2020.  She denies blood in the vomit.  She denies abdominal pain.  She denies fevers or chills.  She states she does have some rib pains that started after vomiting.  She denies chest pain, shortness of breath or cough.  The patient was recently diagnosed with chlamydia and is being treated with doxycycline at home.  She states she is taking that as prescribed.  Last episode of emesis was shortly prior to arrival.    No Known Allergies    Discharge Medication List as of 6/27/2020  9:19 PM      Prior to Admission Medications    Details   doxycycline monohydrate (ADOXA) 100 MG tablet Take 1 tablet by mouth 2 times daily.Eprescribe, Disp-20 tablet, R-0      metroNIDAZOLE (FLAGYL) 500 MG tablet Take 1 tablet by mouth 2 times daily.Eprescribe, Disp-14 tablet, R-0      ferrous sulfate 325 (65 FE) MG tablet Take 1 tablet by mouth daily (with breakfast).Eprescribe, Disp-30 tablet, R-3             Past Medical History:   Diagnosis Date   • Anxiety and depression    • Herpes genitalia    • Hx of chlamydia infection 2018   • Hx of gonorrhea 2018       No past surgical history on file.    Family History   Problem Relation Age of Onset   • Thyroid Sister    • Other Maternal Grandmother         r/t surgery (unsure what surgery)   • Other Maternal Grandfather         Homicide       Social History     Tobacco Use   • Smoking status: Never Smoker   • Smokeless tobacco: Never Used   Substance Use Topics   • Alcohol use: Never     Frequency: Never   • Drug use: Never       Review of Systems   Constitutional: Negative for chills and fever.   HENT: Negative for congestion, ear pain, sinus pressure, sinus pain  and sore throat.    Eyes: Negative for pain and visual disturbance.   Respiratory: Negative for cough and shortness of breath.    Cardiovascular: Negative for chest pain and palpitations.   Gastrointestinal: Positive for nausea and vomiting. Negative for abdominal pain and diarrhea.   Genitourinary: Negative for dysuria.   Musculoskeletal: Negative for arthralgias and myalgias.   Skin: Negative for rash.   Neurological: Negative for dizziness, syncope, weakness, numbness and headaches.       Physical Exam     ED Triage Vitals   ED Triage Vitals Group      Temp 06/27/20 1915 96.5 °F (35.8 °C)      Heart Rate 06/27/20 1915 62      Resp 06/27/20 1915 16      BP 06/27/20 1915 111/70      SpO2 06/27/20 1915 100 %      EtCO2 mmHg --       Height --       Weight 06/27/20 1918 195 lb 12.3 oz (88.8 kg)      Weight Scale Used 06/27/20 1918 ED Actual      BMI (Calculated) --       IBW/kg (Calculated) --      Visit Vitals  /63 (BP Location: LUE - Left upper extremity)   Pulse (!) 56   Temp 96.5 °F (35.8 °C)   Resp 16   Wt 88.8 kg   LMP 06/08/2020 (Exact Date)   SpO2 100%   BMI 33.60 kg/m²       Physical Exam   Constitutional: She is oriented to person, place, and time. She appears well-developed and well-nourished. No distress.   HENT:   Head: Normocephalic and atraumatic.   Right Ear: External ear normal.   Left Ear: External ear normal.   Nose: Nose normal.   Mouth/Throat: Uvula is midline, oropharynx is clear and moist and mucous membranes are normal.   Eyes: Pupils are equal, round, and reactive to light. Conjunctivae, EOM and lids are normal.   Neck: Full passive range of motion without pain.   Cardiovascular: Normal rate and regular rhythm.   Pulses:       Radial pulses are 2+ on the right side.   Pulmonary/Chest: Effort normal and breath sounds normal. No accessory muscle usage. No respiratory distress.   Abdominal: Normal appearance and bowel sounds are normal. There is no abdominal tenderness.   Neurological: She  is alert and oriented to person, place, and time. GCS eye subscore is 4. GCS verbal subscore is 5. GCS motor subscore is 6.   Skin: Skin is warm and dry.   Psychiatric: She has a normal mood and affect. Her speech is normal and behavior is normal. Thought content normal.   Nursing note and vitals reviewed.      ED Course     Procedures    Lab Results     Results for orders placed or performed during the hospital encounter of 06/27/20   Comprehensive Metabolic Panel   Result Value Ref Range    Fasting Status      Sodium 139 135 - 145 mmol/L    Potassium 3.4 3.4 - 5.1 mmol/L    Chloride 104 98 - 107 mmol/L    Carbon Dioxide 28 21 - 32 mmol/L    Anion Gap 10 10 - 20 mmol/L    Glucose 85 65 - 99 mg/dL    BUN 10 6 - 20 mg/dL    Creatinine 0.68 0.39 - 0.90 mg/dL    Glomerular Filtration Rate      BUN/ Creatinine Ratio 15 7 - 25    Bilirubin, Total 0.2 0.2 - 1.0 mg/dL    GOT/AST 56 (H) 10 - 45 Units/L    Alkaline Phosphatase 82 42 - 110 Units/L    Albumin 3.8 3.6 - 5.1 g/dL    Protein, Total 8.1 6.0 - 8.3 g/dL    Globulin 4.3 (H) 2.0 - 4.0 g/dL    A/G Ratio 0.9 (L) 1.0 - 2.4    GPT/ALT 59 (H) 6 - 35 Units/L    Calcium 9.3 8.0 - 11.0 mg/dL   Magnesium   Result Value Ref Range    Magnesium 1.7 1.7 - 2.4 mg/dL   Lipase   Result Value Ref Range    Lipase 50 (L) 73 - 393 Units/L   CBC with Automated Differential (performable only)   Result Value Ref Range    WBC 11.3 (H) 4.2 - 11.0 K/mcL    RBC 5.59 (H) 3.90 - 5.30 mil/mcL    HGB 12.3 12.0 - 15.5 g/dL    HCT 39.2 36.0 - 46.5 %    MCV 70.1 (L) 78.0 - 100.0 fl    MCH 22.0 (L) 26.0 - 34.0 pg    MCHC 31.4 (L) 32.0 - 36.5 g/dL    RDW-CV 15.7 (H) 11.0 - 15.0 %     140 - 450 K/mcL    NRBC 0 <=0 /100 WBC    Neutrophil, Percent 76 %    Lymphocytes, Percent 14 %    Mono, Percent 9 %    Eosinophils, Percent 1 %    Basophils, Percent 0 %    Immature Granulocytes 0 %    Absolute Neutrophils 8.6 (H) 1.8 - 8.0 K/mcL    Absolute Lymphocytes 1.6 1.2 - 5.2 K/mcL    Absolute Monocytes 1.0  (H) 0.3 - 0.9 K/mcL    Absolute Eosinophils  0.2 0.1 - 0.5 K/mcL    Absolute Basophils 0.0 0.0 - 0.3 K/mcL    Absolute Immmature Granulocytes 0.0 0.0 - 0.2 K/mcL    RDW-SD 38.8 (L) 39.0 - 50.0 fL   URINALYSIS & REFLEX MICRO WITH CULTURE IF INDICATED   Result Value Ref Range    COLOR, URINALYSIS Yellow     APPEARANCE, URINALYSIS Clear     GLUCOSE, URINALYSIS Negative Negative mg/dL    BILIRUBIN, URINALYSIS Negative Negative    KETONES, URINALYSIS Trace (A) Negative mg/dL    SPECIFIC GRAVITY, URINALYSIS 1.018 1.005 - 1.030    OCCULT BLOOD, URINALYSIS Negative Negative    PH, URINALYSIS 5.0 5.0 - 7.0    PROTEIN, URINALYSIS Negative Negative mg/dL    UROBILINOGEN, URINALYSIS 0.2 0.2, 1.0 mg/dL    NITRITE, URINALYSIS Negative Negative    LEUKOCYTE ESTERASE, URINALYSIS Trace (A) Negative   URINALYSIS MICROSCOPIC   Result Value Ref Range    SQUAMOUS EPITHELIAL, URINALYSIS 1 to 5 None Seen, 1 to 5 /hpf    ERYTHROCYTES, URINALYSIS 3 to 5 (A) None Seen, 1 to 2 /hpf    LEUKOCYTES, URINALYSIS 11 to 25 (A) None Seen, 1 to 5 /hpf    BACTERIA, URINALYSIS None Seen None Seen /hpf    HYALINE CASTS, URINALYSIS None Seen None Seen, 1 to 5 /lpf    MUCUS Present    ISTAT BETA HCG - POINT OF CARE   Result Value Ref Range    ISTAT BETA HCG - POINT OF CARE <5.0 <5.0 IU/L       Radiology Results     Imaging Results    None         ED Medication Orders (From admission, onward)    Ordered Start     Status Ordering Provider    06/27/20 2034 06/27/20 2034  azithromycin (ZITHROMAX) tablet 1,000 mg  ONCE      Last MAR action:  Given LEILA RIOS    06/27/20 1932 06/27/20 2000  sodium chloride (NORMAL SALINE) 0.9 % bolus 1,000 mL  ONCE      Last MAR action:  Completed LEILA RIOS    06/27/20 1932 06/27/20 2000  ondansetron (ZOFRAN) injection 4 mg  ONCE      Last MAR action:  Given LEILA RIOS  The patient presents with nausea and nonbloody nonbilious vomiting.  Last episode was shortly prior to arrival.  This began 2 days  ago after an IM dose of Rocephin.  The patient was recently diagnosed chlamydia is being treated with doxycycline at home.  She is taking this as prescribed.  She denies fever, chills, abdominal pain, diarrhea, chest pain, shortness of breath or cough.  She does admit to bilateral rib pain that started after vomiting.  We did obtain consent to treat from the patient's mother.  Physical exam, the patient is well-appearing.  Heart is regular rate rhythm.  Lungs clear to auscultation bilaterally.  Abdomen is soft nontender with normoactive bowel sounds.  She is mildly tender to bilateral lower ribs.  There is no obvious deformity.  Due to the patient's presenting symptoms, IV access obtained and laboratories drawn.  The point of care beta hCG was negative.  The CMP revealed an AST 56 and ALT of 59. The magnesium is normal.  The lipase was normal.  The CBC revealed a WBC of 11.3, this is likely secondary to vomiting.  The UA revealed trace ketones, trace leukocyte esterase, 1 to  Squamous epithelial cells and no bacteria. The patient was given 1g of Azithromycin while in the ED and was instructed to not continue taking her doxcycline at home. She was instructed to use her prescribed zofran as needed for nausea and vomiting. She was instructed to follow up with her PCP within the next week. The patient was stable throughout entire ED visit and upon discharge. She was instructed to return to the ED for new or worsening symptoms. The patient verbalizes understanding and is in agreement with the plan.     Clinical Impression     ED Diagnosis   1. Non-intractable vomiting with nausea, unspecified vomiting type  SERVICE TO PEDIATRICS       Disposition        Discharge 6/27/2020  9:16 PM  Michelle Wiley discharge to home/self care.           Linnea Bocanegra PA-C  06/28/20 0136

## 2021-08-03 ENCOUNTER — PATIENT MESSAGE (OUTPATIENT)
Dept: OBGYN CLINIC | Facility: CLINIC | Age: 56
End: 2021-08-03

## 2021-08-12 ENCOUNTER — OFFICE VISIT (OUTPATIENT)
Dept: PULMONOLOGY | Facility: CLINIC | Age: 56
End: 2021-08-12
Payer: COMMERCIAL

## 2021-08-12 VITALS
SYSTOLIC BLOOD PRESSURE: 132 MMHG | HEIGHT: 64 IN | BODY MASS INDEX: 49.09 KG/M2 | TEMPERATURE: 98.7 F | HEART RATE: 94 BPM | OXYGEN SATURATION: 96 % | DIASTOLIC BLOOD PRESSURE: 78 MMHG

## 2021-08-12 DIAGNOSIS — G47.33 OBSTRUCTIVE SLEEP APNEA SYNDROME: ICD-10-CM

## 2021-08-12 DIAGNOSIS — E66.01 MORBID OBESITY WITH BMI OF 45.0-49.9, ADULT (HCC): ICD-10-CM

## 2021-08-12 DIAGNOSIS — D86.9 SARCOIDOSIS: ICD-10-CM

## 2021-08-12 DIAGNOSIS — R93.89 ABNORMAL CHEST CT: Primary | ICD-10-CM

## 2021-08-12 PROCEDURE — 99215 OFFICE O/P EST HI 40 MIN: CPT | Performed by: INTERNAL MEDICINE

## 2021-08-12 RX ORDER — PANTOPRAZOLE SODIUM 40 MG/1
40 TABLET, DELAYED RELEASE ORAL DAILY
COMMUNITY
Start: 2021-07-15 | End: 2021-09-29

## 2021-08-12 NOTE — PROGRESS NOTES
Pulmonary Follow Up Note   Katty Warren 64 y o  male MRN: [id number]  8/12/2021      Assessment/Plan:    Abnormal chest CT  · Has had persistent RML atelectasis with RML narrowing noted on chest CT  No evidence of endobronchial lesion on chest CT but given persistence, will proceed with flexible bronchoscopy for airway evaluation  · Nette Churchill has other appointments and procedures that he has scheduled  We will schedule this based on Nette Churchill' convenience     Sarcoidosis  · Patient has a distant history of diagnosis of pulmonary sarcoidosis     · Patient says that he  had bronchoscopy approximately 20 years ago with lymph node biopsies and transbronchial biopsies presumably demonstrating granulomas  · We do not have these biopsies for review  · Most recent chest imaging does not demonstrate any mediastinal adenopathy nor parenchymal infiltrates  · Suspect that his current pulmonary symptoms are related to his weight  · No specific sarcoidosis treatment indicated at this time    Obstructive sleep apnea syndrome  · Has had diagnosis of EULALIO for several years and has been compliant with CPAP therapy  · Will obtain compliance data for review prior to next visit    Morbid obesity with BMI of 45 0-49 9, adult [facility])  · This is a significant part of Nette Churchill' symptoms of shortness of breath and deconditioning   He will ikely have restrictive picture on PFTs secondary to his weight  · Discussed the importance of weight and the risks of obesity, he understands and will be making efforts towards weight loss    Health Maintenance  Immunization History   Administered Date(s) Administered    Hep A, adult 03/08/2021    Hep B, adult 03/08/2021, 07/15/2021    INFLUENZA 01/17/2013, 09/09/2013, 09/14/2020    Influenza Quadrivalent 3 years and older 09/14/2020    MMR 03/08/2021, 07/15/2021    Pneumococcal Polysaccharide PPV23 07/29/2014, 02/19/2021    SARS-CoV-2 / COVID-19 mRNA IM (Pf[employer]) 05/22/2021, 06/16/2021    Td (adult), Unspecified 12/01/2006    Tuberculin Skin Test-PPD Intradermal 08/07/2018       Return in about 3 months (around 11/12/2021)  History of Present Illness   HPI:  Prabhu Higgins is a 64 y o  male  with a PMHx of GERD, EULALIO on CPAP, sarcoidosis, soft tissue mass on the back who presents today for follow up       I last saw Ken Peters on July 1st, in summary his history includes  excision of a soft tissue back mass which pathology showed lipoma  He also  does have an underlying history of obstructive sleep apnea and asthma on CPAP therapy for several years  He does use a [employer] Dreamstation machine and states that his machine was part of the recall  He says the has not used the [employer] cleaning product  He has continued to his CPAP machine because he feels he cannot sleep without it  Since our last visit, we obtained PFTs as well as a chest CT for evaluation  He says overall his breathing has been stable  He is trying to make efforts towards weight loss  Review of Systems   Constitutional: Negative for activity change, chills and fever  HENT: Negative for congestion, rhinorrhea, sneezing and voice change  Respiratory: Negative for cough, shortness of breath and wheezing  Cardiovascular: Negative for chest pain and palpitations  Gastrointestinal: Negative for abdominal distention, abdominal pain, diarrhea, nausea and vomiting  Endocrine: Negative for cold intolerance and heat intolerance  Musculoskeletal: Negative for arthralgias, back pain, myalgias and neck stiffness  Skin: Negative for color change, pallor and rash  Neurological: Negative for dizziness, weakness and numbness  Psychiatric/Behavioral: Negative for agitation  The patient is not nervous/anxious          Historical Information   Past Medical History:   Diagnosis Date    Anxiety     GERD (gastroesophageal reflux disease)     Joint pain in both hands 2/19/2021    Kidney stone     Kidney stones 2/19/2021    Mass of anterior abdominal wall 2/19/2021    LLQ 9x6 cm    [facility] infarction) [facility] [address]     Morbid obesity with BMI of 50 0-59 9, adult ([facility] [address] )     Need for hepatitis B vaccination 7/15/2021    Need for MMR vaccine 7/15/2021    Nonimmune to hepatitis B virus 3/4/2021    Parotid gland enlargement 2/19/2021    left    Prediabetes 3/4/2021    Primary osteoarthritis of left knee 7/15/2021    RUQ pain 7/15/2021    Sarcoidosis 2/19/2021    Sleep apnea     cpap    Soft tissue mass 2/19/2021    R upper back 9x 11x3cm L upper back 14x20x 4 cm     Past Surgical History:   Procedure Laterality Date    BRONCHOSCOPY      COLONOSCOPY      EGD      HAND SURGERY Bilateral 2019    trigger finger and carpal tunnel     KNEE ARTHROSCOPY      meniscus surgery    CA EXCISION TUMOR SOFT TISSUE BACK/FLANK SUBQ 3+CM N/A 4/8/2021    Procedure: excision large mass back subfascial  25x 13x 3;  Surgeon: Ghislaine Abreu MD;  Location:  MAIN OR;  Service: General    VASECTOMY       Family History   Problem Relation Age of Onset    Heart disease Mother     Diabetes Mother     Heart disease Father     Cancer Father         blood cancer    Heart disease Paternal Aunt     Heart disease Paternal Uncle     Diabetes Maternal Grandfather     Stroke Neg Hx          Meds/Allergies     Current Outpatient Medications:     Ascorbic Acid (vitamin C) 100 MG tablet, Take 100 mg by mouth daily, Disp: , Rfl:     atorvastatin (LIPITOR) 20 mg tablet, Take 20 mg by mouth daily, Disp: , Rfl:     Cholecalciferol (VITAMIN D PO), Take 10,000 Units by mouth every other day , Disp: , Rfl:     escitalopram (LEXAPRO) 10 mg tablet, daily , Disp: , Rfl:     Multiple Vitamins-Minerals (multivitamin with minerals) tablet, Take 1 tablet by mouth daily, Disp: , Rfl:     pantoprazole (PROTONIX) 40 mg, Take 40 mg by mouth daily Protonix 40 MG Oral Packet  Refills: 0  Active , Disp: , Rfl:     aspirin (Aspirin 81) 81 mg EC tablet, Take 1 tablet (81 mg total) by mouth daily You may restart your daily aspirin on Sunday April 11th  (Patient not taking: Reported on 7/1/2021), Disp: , Rfl: 0    pantoprazole (PROTONIX) 40 mg tablet, Take 40 mg by mouth daily (Patient not taking: Reported on 8/12/2021), Disp: , Rfl:   Allergies   Allergen Reactions    Azithromycin Abdominal Pain    Erythromycin Abdominal Pain       Vitals: Blood pressure 132/78, pulse 94, temperature 98 7 °F (37 1 °C), temperature source Tympanic, height 5' 4" (1 626 m), SpO2 96 %  Body mass index is 49 09 kg/m²  Oxygen Therapy  SpO2: 96 %    Physical Exam  Constitutional:       Appearance: Normal appearance  He is obese  HENT:      Head: Normocephalic and atraumatic  Nose: Nose normal       Mouth/Throat:      Mouth: Mucous membranes are moist       Pharynx: Oropharynx is clear  Cardiovascular:      Rate and Rhythm: Normal rate and regular rhythm  Pulses: Normal pulses  Heart sounds: Normal heart sounds  No murmur heard  Pulmonary:      Effort: Pulmonary effort is normal  No respiratory distress  Breath sounds: Normal breath sounds  No wheezing  Abdominal:      General: Abdomen is flat  There is no distension  Palpations: Abdomen is soft  Tenderness: There is no abdominal tenderness  Musculoskeletal:         General: No swelling or tenderness  Cervical back: Normal range of motion and neck supple  Right lower leg: No edema  Left lower leg: No edema  Skin:     General: Skin is warm and dry  Findings: No rash  Neurological:      General: No focal deficit present  Mental Status: He is alert and oriented to person, place, and time  Psychiatric:         Mood and Affect: Mood normal          Behavior: Behavior normal          Labs: I have personally reviewed pertinent lab results    Lab Results   Component Value Date    WBC 5 48 02/20/2021    HGB 15 3 02/20/2021    HCT 46 9 02/20/2021    MCV 88 02/20/2021     02/20/2021     Lab Results   Component Value Date    CALCIUM 9 4 02/20/2021    K 4 2 02/20/2021    CO2 26 02/20/2021     02/20/2021    BUN 16 02/20/2021    CREATININE 1 19 02/20/2021     No results found for: IGE  Lab Results   Component Value Date    ALT 36 02/20/2021    AST 20 02/20/2021    ALKPHOS 92 02/20/2021         Imaging and other studies: I have personally reviewed pertinent reports  and I have personally reviewed pertinent films in PACS    CT Chest with contrast: chronic right middle lobe atelectasis    Pulmonary function testing:   FEV1/FVC ratio 86%    FEV1 76% predicted  FVC 63% predicted  No response to bronchodilators  TLC 76 % predicted   % predicted  DLCO corrected for hemoglobin 95 % predicted      EKG, Pathology, and Other Studies: I have personally reviewed pertinent reports  and I have personally reviewed pertinent films in PACS      [facility]

## 2021-08-25 ENCOUNTER — OFFICE VISIT (OUTPATIENT)
Dept: OBGYN CLINIC | Facility: CLINIC | Age: 56
End: 2021-08-25
Payer: COMMERCIAL

## 2021-08-25 VITALS
SYSTOLIC BLOOD PRESSURE: 131 MMHG | HEART RATE: 73 BPM | WEIGHT: 286 LBS | HEIGHT: 64 IN | BODY MASS INDEX: 48.83 KG/M2 | DIASTOLIC BLOOD PRESSURE: 79 MMHG

## 2021-08-25 DIAGNOSIS — Z01.812 PRE-PROCEDURE LAB EXAM: ICD-10-CM

## 2021-08-25 DIAGNOSIS — G56.02 LEFT CARPAL TUNNEL SYNDROME: Primary | ICD-10-CM

## 2021-08-25 DIAGNOSIS — M65.322 TRIGGER FINGER, LEFT INDEX FINGER: ICD-10-CM

## 2021-08-25 PROCEDURE — 3008F BODY MASS INDEX DOCD: CPT | Performed by: SURGERY

## 2021-08-25 PROCEDURE — 1036F TOBACCO NON-USER: CPT | Performed by: SURGERY

## 2021-08-25 PROCEDURE — 99213 OFFICE O/P EST LOW 20 MIN: CPT | Performed by: SURGERY

## 2021-08-25 NOTE — PATIENT INSTRUCTIONS
What to Expect After Hand Surgery  Below are typical postoperative expectations and recommendations for our patients having hand/elbow surgery  Please understand, however, that every case and every patient are different so these recommendations are subject to change on an individual basis  Please be flexible if you are told something different on the day of surgery and understand that we do so with your best interest at heart  Postoperative care:    Elevate your hand above your elbow for 24-48 hours after surgery to help with swelling   Place your hand and arm over your head with motion at your shoulder three times a day   Do NOT apply any cream/ointment/oil to your incisions including antibiotic ointment, peroxide, etc     Do NOT soak your hands in standing water (dishwater, tubs, Jacuzzi's, pools, etc ) until given permission (typically 2-3 weeks after injury)   What to watch out for:    Increased numbness or tingling of your hand or fingers that is not relieved with elevation   Increasing pain that is not controlled with medication   Difficulty chewing, breathing, swallowing   Chest pains or shortness of breath   Fever over 101 4 degrees  Bandages:    Please keep bandages clean and dry, you will need to cover bandages with plastic bag or cast bag when showering  Any sutures will be removed in the office, please do not try and remove these on your own  Any steri-strips will fall off on their own or we will remove them in the office as well   For smaller procedures (carpal tunnel, trigger fingers, deQuervain's release, etc ) you will be able to remove the bandages 5-7 days from surgery  Once the bandages are removed you will be able to wash the hand and take short showers  Avoid soaking the wounds in any standing water (no dirty dishes, no pools/baths/hot tubs/ocean water, etc) until you are seen at your follow up visit      For fractures, tendon repairs, and other larger procedures we will typically have you keep the bandages on until we see you back in the office at your follow up visit  In some cases we may have you meet with occupational therapy before we see you back  If this is the case the therapist will remove your bandages for you and the above wound care instructions will apply   We emphasize that you do not put anything on the surgical wounds including neosporin, lotions, oils, peroxide, etc  These can delay wound healing and open you up to infections  Bandaids are okay in some cases, but should only be in place for a short time as they can hold moisture in and break down the wound  Motion and activity:    We encourage you to move any non-splinted fingers into a full tight fist as soon as possible after surgery unless otherwise specified by the surgical team  Hands get very stiff very quickly, so keep them moving!  Weight bearing status will depend on your surgery and will be specified in your postoperative instructions  Frequently we advise no lifting over 1-2 pounds with the operative hand, this allows you to perform activities of daily living (eating, brushing teeth/hair, etc), but also protects you from damaging the surgical site  In some cases we advise that you do not lift anything with the operative hand, but this will be specified in your instructions day of surgery   Depending on your job and what surgery you had done some patients can return to work shortly after surgery  Typically if your job involves heavy lifting, griping, or manual labor we advise that you do not work until we see you back for your first follow up visit  These jobs carry a high risk of surgical site infections and wound healing complications  Pain medication:    We will prescribe you a one-time script of narcotic pain medications for postoperative pain, the amount will depend on what surgery was done   In addition to this we typically recommend Tylenol and Ibuprofen/naproxen for pain control, these medications work best when alternated every 3-4 hours   Medications will vary between patients, so if you have any allergies or concerns please let us know and we can adjust our recommendations as needed   Please let us know if you already take narcotic pain medicine regularly or if you are already established with pain management  Often times in these cases you may have signed a pain agreement with the prescribing provider and we will need to discuss with them regarding your postoperative care  Follow-up:    Most follow up appointments are made when you schedule surgery, if you do not have a follow up by the time you schedule surgery please call the office to make an appointment  Typical follow up is 10-14 days from surgery unless otherwise specified

## 2021-08-25 NOTE — PROGRESS NOTES
ASSESSMENT/PLAN:      64year old Male here for his left carpal tunnel syndrome and left index trigger digits  Patient is currently scheduled for 11/5/2021 and wishes to try to move his surgery up  Reminded the patient that he will be in his postop dressing for at least 5 days  With a total of 2 1/2 to 3 weeks until he feels back to normal to his  duties  The patient verbalized understanding of exam findings and treatment plan  We engaged in the shared decision-making process and treatment options were discussed at length with the patient  Surgical and conservative management discussed today along with risks and benefits  Diagnoses and all orders for this visit:    Left carpal tunnel syndrome      Follow Up:  Return for Follow up post- op  To Do Next Visit:  Re-evaluation of current issue    ____________________________________________________________________________________________________________________________________________      CHIEF COMPLAINT:  Chief Complaint   Patient presents with    Left Hand - Follow-up, Pain, Numbness       SUBJECTIVE:  Prabhu Higgins is a 64y o  year old RHD male who presents today to further discuss moving up his left carpal tunnel surgery that is scheduled for 11/5/2021  He has had numbness and tingling into the left hand consistent with nighttime bracing  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  I have personally reviewed all the relevant PMH, PSH, SH, FH, Medications and allergies       PAST MEDICAL HISTORY:  Past Medical History:   Diagnosis Date    Anxiety     GERD (gastroesophageal reflux disease)     Joint pain in both hands 2/19/2021    Kidney stone     Kidney stones 2/19/2021    Mass of anterior abdominal wall 2/19/2021    LLQ 9x6 cm    MI [facility] infarction) [facility] [address]     Morbid obesity with BMI of 50 0-59 9, adult ([facility] [address] )     Need for hepatitis B vaccination 7/15/2021    Need for MMR vaccine 7/15/2021    Nonimmune to hepatitis B virus 3/4/2021    Parotid gland enlargement 2/19/2021    left    Prediabetes 3/4/2021    Primary osteoarthritis of left knee 7/15/2021    RUQ pain 7/15/2021    Sarcoidosis 2/19/2021    Sleep apnea     cpap    Soft tissue mass 2/19/2021    R upper back 9x 11x3cm L upper back 14x20x 4 cm       PAST SURGICAL HISTORY:  Past Surgical History:   Procedure Laterality Date    BRONCHOSCOPY      COLONOSCOPY      EGD      HAND SURGERY Bilateral 2019    trigger finger and carpal tunnel     KNEE ARTHROSCOPY      meniscus surgery    TX EXCISION TUMOR SOFT TISSUE BACK/FLANK SUBQ 3+CM N/A 4/8/2021    Procedure: excision large mass back subfascial  25x 13x 3;  Surgeon: Aaron Ramirez MD;  Location:  MAIN OR;  Service: General    VASECTOMY         FAMILY HISTORY:  Family History   Problem Relation Age of Onset    Heart disease Mother     Diabetes Mother     Heart disease Father     Cancer Father         blood cancer    Heart disease Paternal Aunt     Heart disease Paternal Uncle     Diabetes Maternal Grandfather     Stroke Neg Hx        SOCIAL HISTORY:  Social History     Tobacco Use    Smoking status: Never Smoker    Smokeless tobacco: Never Used   Vaping Use    Vaping Use: Never used   Substance Use Topics    Alcohol use: No    Drug use: No       MEDICATIONS:    Current Outpatient Medications:     Ascorbic Acid (vitamin C) 100 MG tablet, Take 100 mg by mouth daily, Disp: , Rfl:     atorvastatin (LIPITOR) 20 mg tablet, Take 20 mg by mouth daily, Disp: , Rfl:     Cholecalciferol (VITAMIN D PO), Take 10,000 Units by mouth every other day , Disp: , Rfl:     escitalopram (LEXAPRO) 10 mg tablet, daily , Disp: , Rfl:     Multiple Vitamins-Minerals (multivitamin with minerals) tablet, Take 1 tablet by mouth daily, Disp: , Rfl:     pantoprazole (PROTONIX) 40 mg, Take 40 mg by mouth daily Protonix 40 MG Oral Packet  Refills: 0  Active , Disp: , Rfl:     aspirin (Aspirin 81) 81 mg EC tablet, Take 1 tablet (81 mg total) by mouth daily You may restart your daily aspirin on Sunday April 11th  (Patient not taking: Reported on 7/1/2021), Disp: , Rfl: 0    pantoprazole (PROTONIX) 40 mg tablet, Take 40 mg by mouth daily (Patient not taking: Reported on 8/12/2021), Disp: , Rfl:     ALLERGIES:  Allergies   Allergen Reactions    Azithromycin Abdominal Pain    Erythromycin Abdominal Pain       REVIEW OF SYSTEMS:  Review of Systems   Constitutional: Negative for chills, fever and unexpected weight change  HENT: Negative for hearing loss, nosebleeds and sore throat  Eyes: Negative for pain, redness and visual disturbance  Respiratory: Negative for cough, shortness of breath and wheezing  Cardiovascular: Negative for chest pain, palpitations and leg swelling  Gastrointestinal: Negative for abdominal pain, nausea and vomiting  Endocrine: Negative for polydipsia and polyuria  Genitourinary: Negative for dysuria and hematuria  Skin: Negative for rash and wound  Neurological: Negative for dizziness, light-headedness and headaches  Psychiatric/Behavioral: Negative for decreased concentration, dysphoric mood and suicidal ideas  The patient is not nervous/anxious          VITALS:  Vitals:    08/25/21 0756   BP: 131/79   Pulse: 73       LABS:  HgA1c:   Lab Results   Component Value Date    HGBA1C 6 2 (H) 02/20/2021     BMP:   Lab Results   Component Value Date    CALCIUM 9 4 02/20/2021    K 4 2 02/20/2021    CO2 26 02/20/2021     02/20/2021    BUN 16 02/20/2021    CREATININE 1 19 02/20/2021       _____________________________________________________  PHYSICAL EXAMINATION:  General: well developed and well nourished, alert, oriented times 3 and appears comfortable  Psychiatric: Normal  HEENT: Normocephalic, Atraumatic Trachea Midline, No torticollis  Pulmonary: No audible wheezing or respiratory distress   Cardiovascular: No pitting edema, 2+ radial pulse   Abdominal/GI: abdomen non tender, non distended Skin: No masses, erythema, lacerations, fluctation, ulcerations  Neurovascular: Sensation intact to the Ulnar Nerve, Sensation Intact to the Radial Nerve, Decreased Sensation to  the Median Nerve, Motor Intact to the Median, Ulnar, Radial Nerve and Pulses Intact  Musculoskeletal: Normal, except as noted in detailed exam and in HPI  MUSCULOSKELETAL EXAMINATION:    Left Carpal Tunnel Exam:  Negative thenar atrophy  Positive phalen's test  Positive carpal tunnel compression   Negative tinels over median nerve at the wrist    ___________________________________________________  STUDIES REVIEWED:   no images reviewed at todays visit    PROCEDURES PERFORMED:  Procedures  No Procedures performed today    _____________________________________________________      Tracie Flores    I,:  Hugo Castellanos am acting as a scribe while in the presence of the attending physician :       I,:  Mariam Goldberg, MD personally performed the services described in this documentation    as scribed in my presence :

## 2021-09-22 ENCOUNTER — TELEPHONE (OUTPATIENT)
Dept: OBGYN CLINIC | Facility: HOSPITAL | Age: 56
End: 2021-09-22

## 2021-09-22 NOTE — TELEPHONE ENCOUNTER
Patient is calling to find out the date of his sx  Patient would like a phone number for the anesthesiologist  I let him know that they typically visit the patient prior to the sx on that day  I also let him know that typically someone call a week prior to discuss & answer any questions  Patient does not need a call back unless we can offer a phone number for him to call   [phone]

## 2021-09-29 ENCOUNTER — OFFICE VISIT (OUTPATIENT)
Dept: LAB | Facility: CLINIC | Age: 56
End: 2021-09-29
Payer: COMMERCIAL

## 2021-09-29 DIAGNOSIS — G56.02 LEFT CARPAL TUNNEL SYNDROME: ICD-10-CM

## 2021-09-29 DIAGNOSIS — M65.311 TRIGGER THUMB OF RIGHT HAND: ICD-10-CM

## 2021-09-29 DIAGNOSIS — M65.322 TRIGGER FINGER, LEFT INDEX FINGER: ICD-10-CM

## 2021-09-29 DIAGNOSIS — Z01.812 PRE-PROCEDURE LAB EXAM: ICD-10-CM

## 2021-09-29 LAB
ATRIAL RATE: 81 BPM
P AXIS: 33 DEGREES
PR INTERVAL: 162 MS
QRS AXIS: 8 DEGREES
QRSD INTERVAL: 92 MS
QT INTERVAL: 370 MS
QTC INTERVAL: 429 MS
T WAVE AXIS: 23 DEGREES
VENTRICULAR RATE: 81 BPM

## 2021-09-29 PROCEDURE — 93010 ELECTROCARDIOGRAM REPORT: CPT | Performed by: INTERNAL MEDICINE

## 2021-09-29 PROCEDURE — 93005 ELECTROCARDIOGRAM TRACING: CPT

## 2021-09-30 ENCOUNTER — APPOINTMENT (OUTPATIENT)
Dept: LAB | Facility: AMBULARY SURGERY CENTER | Age: 56
End: 2021-09-30
Attending: SURGERY
Payer: COMMERCIAL

## 2021-09-30 DIAGNOSIS — M65.311 TRIGGER THUMB OF RIGHT HAND: ICD-10-CM

## 2021-09-30 DIAGNOSIS — G56.02 LEFT CARPAL TUNNEL SYNDROME: ICD-10-CM

## 2021-09-30 DIAGNOSIS — M65.322 TRIGGER FINGER, LEFT INDEX FINGER: ICD-10-CM

## 2021-09-30 DIAGNOSIS — Z01.812 PRE-PROCEDURE LAB EXAM: ICD-10-CM

## 2021-09-30 LAB
ANION GAP SERPL CALCULATED.3IONS-SCNC: 2 MMOL/L (ref 4–13)
BUN SERPL-MCNC: 12 MG/DL (ref 5–25)
CALCIUM SERPL-MCNC: 9.9 MG/DL (ref 8.3–10.1)
CHLORIDE SERPL-SCNC: 107 MMOL/L (ref 100–108)
CO2 SERPL-SCNC: 28 MMOL/L (ref 21–32)
CREAT SERPL-MCNC: 1.03 MG/DL (ref 0.6–1.3)
GFR SERPL CREATININE-BSD FRML MDRD: 81 ML/MIN/1.73SQ M
GLUCOSE P FAST SERPL-MCNC: 124 MG/DL (ref 65–99)
POTASSIUM SERPL-SCNC: 4.1 MMOL/L (ref 3.5–5.3)
SODIUM SERPL-SCNC: 137 MMOL/L (ref 136–145)

## 2021-09-30 PROCEDURE — 80048 BASIC METABOLIC PNL TOTAL CA: CPT

## 2021-09-30 PROCEDURE — 36415 COLL VENOUS BLD VENIPUNCTURE: CPT

## 2021-10-01 ENCOUNTER — TELEPHONE (OUTPATIENT)
Dept: OBGYN CLINIC | Facility: HOSPITAL | Age: 56
End: 2021-10-01

## 2021-10-06 ENCOUNTER — TELEPHONE (OUTPATIENT)
Dept: OBGYN CLINIC | Facility: CLINIC | Age: 56
End: 2021-10-06

## 2021-10-25 ENCOUNTER — TELEPHONE (OUTPATIENT)
Dept: FAMILY MEDICINE CLINIC | Facility: CLINIC | Age: 56
End: 2021-10-25

## 2021-10-25 DIAGNOSIS — F41.9 ANXIETY: Primary | ICD-10-CM

## 2021-10-25 RX ORDER — ESCITALOPRAM OXALATE 10 MG/1
10 TABLET ORAL DAILY
Qty: 90 TABLET | Refills: 1 | Status: SHIPPED | OUTPATIENT
Start: 2021-10-25 | End: 2022-06-15

## 2021-11-02 ENCOUNTER — TELEMEDICINE (OUTPATIENT)
Dept: FAMILY MEDICINE CLINIC | Facility: CLINIC | Age: 56
End: 2021-11-02
Payer: COMMERCIAL

## 2021-11-02 DIAGNOSIS — R09.81 NASAL CONGESTION: Primary | ICD-10-CM

## 2021-11-02 DIAGNOSIS — R09.82 POST-NASAL DRIP: ICD-10-CM

## 2021-11-02 PROCEDURE — U0003 INFECTIOUS AGENT DETECTION BY NUCLEIC ACID (DNA OR RNA); SEVERE ACUTE RESPIRATORY SYNDROME CORONAVIRUS 2 (SARS-COV-2) (CORONAVIRUS DISEASE [COVID-19]), AMPLIFIED PROBE TECHNIQUE, MAKING USE OF HIGH THROUGHPUT TECHNOLOGIES AS DESCRIBED BY CMS-2020-01-R: HCPCS | Performed by: NURSE PRACTITIONER

## 2021-11-02 PROCEDURE — 99213 OFFICE O/P EST LOW 20 MIN: CPT | Performed by: NURSE PRACTITIONER

## 2021-11-02 PROCEDURE — U0005 INFEC AGEN DETEC AMPLI PROBE: HCPCS | Performed by: NURSE PRACTITIONER

## 2021-11-03 ENCOUNTER — TELEPHONE (OUTPATIENT)
Dept: FAMILY MEDICINE CLINIC | Facility: CLINIC | Age: 56
End: 2021-11-03

## 2021-11-03 DIAGNOSIS — R09.89 CHEST CONGESTION: ICD-10-CM

## 2021-11-03 DIAGNOSIS — R05.9 COUGH: ICD-10-CM

## 2021-11-03 DIAGNOSIS — Z20.822 EXPOSURE TO COVID-19 VIRUS: Primary | ICD-10-CM

## 2021-11-04 ENCOUNTER — CLINICAL SUPPORT (OUTPATIENT)
Dept: FAMILY MEDICINE CLINIC | Facility: CLINIC | Age: 56
End: 2021-11-04

## 2021-11-04 DIAGNOSIS — Z20.822 EXPOSURE TO COVID-19 VIRUS: Primary | ICD-10-CM

## 2021-11-04 PROCEDURE — U0005 INFEC AGEN DETEC AMPLI PROBE: HCPCS | Performed by: FAMILY MEDICINE

## 2021-11-04 PROCEDURE — U0003 INFECTIOUS AGENT DETECTION BY NUCLEIC ACID (DNA OR RNA); SEVERE ACUTE RESPIRATORY SYNDROME CORONAVIRUS 2 (SARS-COV-2) (CORONAVIRUS DISEASE [COVID-19]), AMPLIFIED PROBE TECHNIQUE, MAKING USE OF HIGH THROUGHPUT TECHNOLOGIES AS DESCRIBED BY CMS-2020-01-R: HCPCS | Performed by: FAMILY MEDICINE

## 2021-11-05 ENCOUNTER — TELEPHONE (OUTPATIENT)
Dept: FAMILY MEDICINE CLINIC | Facility: CLINIC | Age: 56
End: 2021-11-05

## 2021-11-05 LAB — SARS-COV-2 RNA RESP QL NAA+PROBE: NEGATIVE

## 2021-11-08 ENCOUNTER — RA CDI HCC (OUTPATIENT)
Dept: OTHER | Facility: HOSPITAL | Age: 56
End: 2021-11-08

## 2021-11-15 ENCOUNTER — TELEMEDICINE (OUTPATIENT)
Dept: FAMILY MEDICINE CLINIC | Facility: CLINIC | Age: 56
End: 2021-11-15
Payer: COMMERCIAL

## 2021-11-15 VITALS — HEIGHT: 64 IN | BODY MASS INDEX: 48.83 KG/M2 | WEIGHT: 286 LBS

## 2021-11-15 DIAGNOSIS — R23.3 BLEEDING SKIN MOLE: ICD-10-CM

## 2021-11-15 DIAGNOSIS — D22.9 BLEEDING SKIN MOLE: ICD-10-CM

## 2021-11-15 DIAGNOSIS — Z20.822 EXPOSURE TO COVID-19 VIRUS: Primary | ICD-10-CM

## 2021-11-15 PROCEDURE — 3008F BODY MASS INDEX DOCD: CPT | Performed by: INTERNAL MEDICINE

## 2021-11-15 PROCEDURE — 99213 OFFICE O/P EST LOW 20 MIN: CPT | Performed by: FAMILY MEDICINE

## 2021-11-16 ENCOUNTER — TELEMEDICINE (OUTPATIENT)
Dept: PULMONOLOGY | Facility: CLINIC | Age: 56
End: 2021-11-16
Payer: COMMERCIAL

## 2021-11-16 DIAGNOSIS — R93.89 ABNORMAL CHEST CT: ICD-10-CM

## 2021-11-16 DIAGNOSIS — G47.33 OBSTRUCTIVE SLEEP APNEA SYNDROME: ICD-10-CM

## 2021-11-16 DIAGNOSIS — Z20.822 EXPOSURE TO COVID-19 VIRUS: ICD-10-CM

## 2021-11-16 DIAGNOSIS — D86.9 SARCOIDOSIS: ICD-10-CM

## 2021-11-16 DIAGNOSIS — E66.01 MORBID OBESITY WITH BMI OF 45.0-49.9, ADULT (HCC): Primary | ICD-10-CM

## 2021-11-16 PROCEDURE — 99214 OFFICE O/P EST MOD 30 MIN: CPT | Performed by: INTERNAL MEDICINE

## 2021-12-15 ENCOUNTER — TELEPHONE (OUTPATIENT)
Dept: FAMILY MEDICINE CLINIC | Facility: CLINIC | Age: 56
End: 2021-12-15

## 2021-12-27 ENCOUNTER — TELEMEDICINE (OUTPATIENT)
Dept: FAMILY MEDICINE CLINIC | Facility: CLINIC | Age: 56
End: 2021-12-27
Payer: COMMERCIAL

## 2021-12-27 VITALS — HEIGHT: 64 IN | BODY MASS INDEX: 48.83 KG/M2 | WEIGHT: 286 LBS

## 2021-12-27 DIAGNOSIS — R11.0 NAUSEA: ICD-10-CM

## 2021-12-27 DIAGNOSIS — G44.52 NEW DAILY PERSISTENT HEADACHE: Primary | ICD-10-CM

## 2021-12-27 DIAGNOSIS — R52 BODY ACHES: ICD-10-CM

## 2021-12-27 DIAGNOSIS — R50.9 FEVER, UNSPECIFIED FEVER CAUSE: ICD-10-CM

## 2021-12-27 PROCEDURE — 3008F BODY MASS INDEX DOCD: CPT | Performed by: INTERNAL MEDICINE

## 2021-12-27 PROCEDURE — 99213 OFFICE O/P EST LOW 20 MIN: CPT | Performed by: NURSE PRACTITIONER

## 2021-12-27 RX ORDER — ALBUTEROL SULFATE 90 UG/1
2 AEROSOL, METERED RESPIRATORY (INHALATION) EVERY 6 HOURS PRN
Qty: 18 G | Refills: 1 | Status: SHIPPED | OUTPATIENT
Start: 2021-12-27 | End: 2022-01-06

## 2021-12-27 RX ORDER — AMOXICILLIN 875 MG/1
875 TABLET, COATED ORAL 2 TIMES DAILY
Qty: 14 TABLET | Refills: 0 | Status: SHIPPED | OUTPATIENT
Start: 2021-12-27 | End: 2022-01-03

## 2021-12-28 ENCOUNTER — TELEPHONE (OUTPATIENT)
Dept: FAMILY MEDICINE CLINIC | Facility: CLINIC | Age: 56
End: 2021-12-28

## 2021-12-29 PROCEDURE — U0003 INFECTIOUS AGENT DETECTION BY NUCLEIC ACID (DNA OR RNA); SEVERE ACUTE RESPIRATORY SYNDROME CORONAVIRUS 2 (SARS-COV-2) (CORONAVIRUS DISEASE [COVID-19]), AMPLIFIED PROBE TECHNIQUE, MAKING USE OF HIGH THROUGHPUT TECHNOLOGIES AS DESCRIBED BY CMS-2020-01-R: HCPCS | Performed by: NURSE PRACTITIONER

## 2021-12-29 PROCEDURE — U0005 INFEC AGEN DETEC AMPLI PROBE: HCPCS | Performed by: NURSE PRACTITIONER

## 2022-01-10 ENCOUNTER — TELEMEDICINE (OUTPATIENT)
Dept: FAMILY MEDICINE CLINIC | Facility: CLINIC | Age: 57
End: 2022-01-10
Payer: COMMERCIAL

## 2022-01-10 VITALS — WEIGHT: 286 LBS | BODY MASS INDEX: 48.83 KG/M2 | HEIGHT: 64 IN

## 2022-01-10 DIAGNOSIS — R53.83 OTHER FATIGUE: ICD-10-CM

## 2022-01-10 DIAGNOSIS — R73.03 PREDIABETES: ICD-10-CM

## 2022-01-10 DIAGNOSIS — U07.1 COVID-19: ICD-10-CM

## 2022-01-10 DIAGNOSIS — E66.01 MORBID OBESITY WITH BMI OF 45.0-49.9, ADULT (HCC): Primary | ICD-10-CM

## 2022-01-10 PROCEDURE — 99214 OFFICE O/P EST MOD 30 MIN: CPT | Performed by: FAMILY MEDICINE

## 2022-01-10 RX ORDER — FLUTICASONE PROPIONATE 110 UG/1
4 AEROSOL, METERED RESPIRATORY (INHALATION) 2 TIMES DAILY
Qty: 12 G | Refills: 0 | Status: SHIPPED | OUTPATIENT
Start: 2022-01-10 | End: 2022-02-18

## 2022-01-10 RX ORDER — FLUVOXAMINE MALEATE 100 MG
100 TABLET ORAL 2 TIMES DAILY
Qty: 14 TABLET | Refills: 0 | Status: SHIPPED | OUTPATIENT
Start: 2022-01-10 | End: 2022-01-10

## 2022-01-10 RX ORDER — FLUVOXAMINE MALEATE 100 MG
100 TABLET ORAL 2 TIMES DAILY
Qty: 14 TABLET | Refills: 0 | Status: SHIPPED | OUTPATIENT
Start: 2022-01-10 | End: 2022-01-25

## 2022-01-10 RX ORDER — PREDNISONE 20 MG/1
20 TABLET ORAL DAILY
Qty: 7 TABLET | Refills: 0 | Status: SHIPPED | OUTPATIENT
Start: 2022-01-10 | End: 2022-01-17

## 2022-01-10 NOTE — PROGRESS NOTES
COVID-19 Outpatient Progress Note    Assessment/Plan:    covid 19 + 12/29/2021=took home rapid covid test yesterday=still positive  Both son and daughter + covid  Had covid vaccine 5/2021, not booster  Feeling better but still very tired  Was given amox  Start pred/fluvoxamine=hold lexapro/flovent inhaler/hydration/asa 325 mg daily x 30 days  Fu 2 weeks    Problem List Items Addressed This Visit        Other    Morbid obesity with BMI of 45 0-49 9, adult ([facility] [address] ) - Primary    Prediabetes    COVID-19    Relevant Medications    predniSONE 20 mg tablet    fluticasone (FLOVENT HFA) 110 MCG/ACT inhaler    fluvoxaMINE (LUVOX) 100 mg tablet      Other Visit Diagnoses     Other fatigue        Relevant Medications    predniSONE 20 mg tablet         Disposition:     Patient is fully vaccinated and I recommended self quarantine for 5 days followed by strict mask use for an additional 5 days  If patient were to develop symptoms, they should immediately self isolate and call our office for further guidance  I have spent 30 minutes directly with the patient  Greater than 50% of this time was spent in counseling/coordination of care regarding: diagnostic results, prognosis, risks and benefits of treatment options, instructions for management, patient and family education, importance of treatment compliance, risk factor reductions and impressions  Encounter provider Trinh Epperson MD    Provider located at [address] [place] [id number] [phone]    Recent Visits  No visits were found meeting these conditions  Showing recent visits within past 7 days and meeting all other requirements  Today's Visits  Date Type Provider Dept   01/10/22 Telemedicine Trinh Epperson MD  [id number] [address] today's visits and meeting all other requirements  Future Appointments  No visits were found meeting these conditions    Showing future appointments within next 150 days and meeting all other requirements     This virtual check-in was done via [employer] and patient was informed that this is a secure, HIPAA-compliant platform  He agrees to proceed  Patient agrees to participate in a virtual check in via telephone or video visit instead of presenting to the office to address urgent/immediate medical needs  Patient is aware this is a billable service  After connecting through [facility], the patient was identified by name and date of birth  Billy Monson was informed that this was a telemedicine visit and that the exam was being conducted confidentially over secure lines  My office door was closed  No one else was in the room  Billy Monson acknowledged consent and understanding of privacy and security of the telemedicine visit  I informed the patient that I have reviewed his record in Epic and presented the opportunity for him to ask any questions regarding the visit today  The patient agreed to participate  Verification of patient location:  Patient is located in the following state in which I hold an active license: [place]    Subjective:   Billy Monson is a 64 y o  male who is concerned about COVID-19  Patient's symptoms include fatigue and malaise  Patient denies cough and shortness of breath       - Date of symptom onset: 12/29/2021  - Date of exposure: 12/24/2021      COVID-19 vaccination status: Fully vaccinated (primary series)    Exposure:   Contact with a person who is under investigation (PUI) for or who is positive for COVID-19 within the last 14 days?: Yes    Hospitalized recently for fever and/or lower respiratory symptoms?: No      Currently a healthcare worker that is involved in direct patient care?: No      Works in a special setting where the risk of COVID-19 transmission may be high? (this may include long-term care, correctional and penitentiary facilities; homeless shelters; assisted-living facilities and group homes ): No Resident in a special setting where the risk of COVID-19 transmission may be high? (this may include long-term care, correctional and [facility] facilities; homeless shelters; assisted-living facilities and group homes ): No      Lab Results   Component Value Date    SARSCOV2 Positive (A) 12/29/2021     Past Medical History:   Diagnosis Date    Anxiety     GERD (gastroesophageal reflux disease)     Joint pain in both hands 2/19/2021    Kidney stone     Kidney stones 2/19/2021    Mass of anterior abdominal wall 2/19/2021    LLQ 9x6 cm    MI (myocardial infarction) ([facility] [address] )     Morbid obesity with BMI of 50 0-59 9, adult ([facility] [address] )     Need for hepatitis B vaccination 7/15/2021    Need for MMR vaccine 7/15/2021    Nonimmune to hepatitis B virus 3/4/2021    Parotid gland enlargement 2/19/2021    left    Prediabetes 3/4/2021    Primary osteoarthritis of left knee 7/15/2021    RUQ pain 7/15/2021    Sarcoidosis 2/19/2021    Sleep apnea     cpap    Soft tissue mass 2/19/2021    R upper back 9x 11x3cm L upper back 14x20x 4 cm     Past Surgical History:   Procedure Laterality Date    BRONCHOSCOPY      COLONOSCOPY      EGD      HAND SURGERY Bilateral 2019    trigger finger and carpal tunnel     KNEE ARTHROSCOPY      meniscus surgery    MI EXCISION TUMOR SOFT TISSUE BACK/FLANK SUBQ 3+CM N/A 4/8/2021    Procedure: excision large mass back subfascial  25x 13x 3;  Surgeon: Keli Beckwith MD;  Location:  MAIN OR;  Service: General    VASECTOMY       Current Outpatient Medications   Medication Sig Dispense Refill    Ascorbic Acid (vitamin C) 100 MG tablet Take 100 mg by mouth daily      Cholecalciferol (VITAMIN D PO) Take 10,000 Units by mouth every other day       escitalopram (Lexapro) 10 mg tablet Take 1 tablet (10 mg total) by mouth daily 90 tablet 1    fluticasone (FLOVENT HFA) 110 MCG/ACT inhaler Inhale 4 puffs 2 (two) times a day for 14 days Rinse mouth after use   12 g 0    fluvoxaMINE (LUVOX) 100 mg tablet Take 1 tablet (100 mg total) by mouth 2 (two) times a day for 7 days Hold lexapro while on this medication 14 tablet 0    Multiple Vitamins-Minerals (multivitamin with minerals) tablet Take 1 tablet by mouth daily      pantoprazole (PROTONIX) 40 mg Take 40 mg by mouth daily Protonix 40 MG Oral Packet  Refills: 0  Active       predniSONE 20 mg tablet Take 1 tablet (20 mg total) by mouth daily for 7 days 7 tablet 0     No current facility-administered medications for this visit  Allergies   Allergen Reactions    Azithromycin Abdominal Pain    Erythromycin Abdominal Pain       Review of Systems   Constitutional: Positive for fatigue  Respiratory: Negative for cough and shortness of breath  Objective:    Vitals:    01/10/22 1436   Weight: 130 kg (286 lb)   Height: 5' 4" (1 626 m)       Physical Exam  Vitals and nursing note reviewed  Constitutional:       Appearance: Normal appearance  He is obese  HENT:      Head: Normocephalic  Pulmonary:      Effort: Pulmonary effort is normal    Neurological:      Mental Status: He is alert and oriented to person, place, and time  Psychiatric:         Mood and Affect: Mood normal          Behavior: Behavior normal          Thought Content: Thought content normal          Judgment: Judgment normal          VIRTUAL VISIT [address] verbally agrees to participate in [employer]  Pt is aware that [employer] could be limited without vital signs or the ability to perform a full hands-on physical [address] understands he or the provider may request at any time to terminate the video visit and request the patient to seek care or treatment in person

## 2022-01-24 ENCOUNTER — PROCEDURE VISIT (OUTPATIENT)
Dept: SURGERY | Facility: CLINIC | Age: 57
End: 2022-01-24
Payer: COMMERCIAL

## 2022-01-24 VITALS
WEIGHT: 284 LBS | HEIGHT: 63 IN | BODY MASS INDEX: 50.32 KG/M2 | DIASTOLIC BLOOD PRESSURE: 90 MMHG | RESPIRATION RATE: 18 BRPM | TEMPERATURE: 98 F | HEART RATE: 83 BPM | SYSTOLIC BLOOD PRESSURE: 140 MMHG

## 2022-01-24 DIAGNOSIS — L98.9 SKIN LESION OF NECK: ICD-10-CM

## 2022-01-24 DIAGNOSIS — L98.9 SKIN LESION OF FACE: Primary | ICD-10-CM

## 2022-01-24 PROCEDURE — 99214 OFFICE O/P EST MOD 30 MIN: CPT | Performed by: SURGERY

## 2022-01-24 NOTE — PROGRESS NOTES
Assessment/Plan:  Patient recently had COVID  I explained to her that I can excise the skin lesion with margins as well as symptomatic skin tags over his neck under IV sedation 8 weeks after his COVID diagnosis  He verbalized understanding  He signed the consent for the procedure  This will be done at [facility] in February  No problem-specific Assessment & Plan notes found for this encounter  Diagnoses and all orders for this visit:    Skin lesion of face    Skin lesion of neck          Subjective:      Patient ID: Willian Anderson is a 64 y o  male  59-year-old male patient came to my office with complaints of a pigmented skin lesion on his face which has been growing in size  He does not complain of any ulceration or bleeding from that lesion  He also has multiple skin tags over his left neck which she wants to get removed as well  No other complaints  The following portions of the patient's history were reviewed and updated as appropriate:   He  has a past medical history of Anxiety, GERD (gastroesophageal reflux disease), Joint pain in both hands (2/19/2021), Kidney stone, Kidney stones (2/19/2021), Mass of anterior abdominal wall (2/19/2021), MI (myocardial infarction) ([facility] [address] ), Morbid obesity with BMI of 50 0-59 9, adult ([facility] [address] ), Need for hepatitis B vaccination (7/15/2021), Need for MMR vaccine (7/15/2021), Nonimmune to hepatitis B virus (3/4/2021), Parotid gland enlargement (2/19/2021), Prediabetes (3/4/2021), Primary osteoarthritis of left knee (7/15/2021), RUQ pain (7/15/2021), Sarcoidosis (2/19/2021), Sleep apnea, and Soft tissue mass (2/19/2021)    He   Patient Active Problem List    Diagnosis Date Noted    COVID-19 01/10/2022    Bleeding skin mole 11/15/2021    Exposure to COVID-19 virus 11/15/2021    Abnormal chest CT 08/12/2021    RUQ pain 07/15/2021    Primary osteoarthritis of left knee 07/15/2021    Need for MMR vaccine 07/15/2021    Need for hepatitis B vaccination 07/15/2021    Nodule of parotid gland 07/06/2021    Trigger thumb of right hand 05/13/2021    Left carpal tunnel syndrome 05/13/2021    Trigger finger, left index finger 05/13/2021    Preoperative evaluation of a medical condition to rule out surgical contraindications (TAR required) 03/04/2021    Prediabetes 03/04/2021    Nonimmune to hepatitis B virus 03/04/2021    Immunization deficiency 03/04/2021    Elevated blood uric acid level 03/04/2021    Cyanocobalamin deficiency 03/04/2021    Elevated C-reactive protein (CRP) 03/04/2021    ESR raised 03/04/2021    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 [address] ) 05/31/2019    GERD (gastroesophageal reflux disease) 05/31/2019    Hyperlipidemia 05/31/2019    Osteoarthritis of right knee 05/31/2019     He  has a past surgical history that includes Knee arthroscopy; Hand surgery (Bilateral, 2019); Vasectomy; Colonoscopy; Bronchoscopy; EGD; and pr excision tumor soft tissue back/flank subq 3+cm (N/A, 4/8/2021)  His family history includes Cancer in his father; Diabetes in his maternal grandfather and mother; Heart disease in his father, mother, paternal aunt, and paternal uncle  He  reports that he has never smoked  He has never used smokeless tobacco  He reports that he does not drink alcohol and does not use drugs    Current Outpatient Medications   Medication Sig Dispense Refill    Ascorbic Acid (vitamin C) 100 MG tablet Take 100 mg by mouth daily      Cholecalciferol (VITAMIN D PO) Take 10,000 Units by mouth every other day       escitalopram (Lexapro) 10 mg tablet Take 1 tablet (10 mg total) by mouth daily 90 tablet 1    fluticasone (FLOVENT HFA) 110 MCG/ACT inhaler Inhale 4 puffs 2 (two) times a day for 14 days Rinse mouth after use  12 g 0    Multiple Vitamins-Minerals (multivitamin with minerals) tablet Take 1 tablet by mouth daily      pantoprazole (PROTONIX) 40 mg Take 40 mg by mouth daily Protonix 40 MG Oral Packet  Refills: 0  Active       fluvoxaMINE (LUVOX) 100 mg tablet Take 1 tablet (100 mg total) by mouth 2 (two) times a day for 7 days Hold lexapro while on this medication 14 tablet 0     No current facility-administered medications for this visit  Current Outpatient Medications on File Prior to Visit   Medication Sig    Ascorbic Acid (vitamin C) 100 MG tablet Take 100 mg by mouth daily    Cholecalciferol (VITAMIN D PO) Take 10,000 Units by mouth every other day     escitalopram (Lexapro) 10 mg tablet Take 1 tablet (10 mg total) by mouth daily    fluticasone (FLOVENT HFA) 110 MCG/ACT inhaler Inhale 4 puffs 2 (two) times a day for 14 days Rinse mouth after use   Multiple Vitamins-Minerals (multivitamin with minerals) tablet Take 1 tablet by mouth daily    pantoprazole (PROTONIX) 40 mg Take 40 mg by mouth daily Protonix 40 MG Oral Packet  Refills: 0  Active     fluvoxaMINE (LUVOX) 100 mg tablet Take 1 tablet (100 mg total) by mouth 2 (two) times a day for 7 days Hold lexapro while on this medication     No current facility-administered medications on file prior to visit  He is allergic to azithromycin and erythromycin       Review of Systems   Constitutional: Negative  HENT: Negative  Eyes: Negative  Respiratory: Negative  Cardiovascular: Negative  Gastrointestinal: Negative  Endocrine: Negative  Genitourinary: Negative  Musculoskeletal: Negative  Skin: Negative  Allergic/Immunologic: Negative  Neurological: Negative  Hematological: Negative  Psychiatric/Behavioral: Negative            Objective:      BP 140/90 (BP Location: Right arm, Patient Position: Sitting, Cuff Size: Adult)   Pulse 83   Temp 98 °F (36 7 °C)   Resp 18   Ht 5' 3" (1 6 m)   Wt 129 kg (284 lb)   BMI 50 31 kg/m²          Physical Exam  Vitals reviewed  Constitutional:       Appearance: He is obese  HENT:      Head: Normocephalic and atraumatic  Nose: Nose normal       Mouth/Throat:      Mouth: Mucous membranes are moist    Eyes:      Pupils: Pupils are equal, round, and reactive to light  Neck:     Musculoskeletal:      Cervical back: Normal range of motion  Neurological:      Mental Status: He is alert

## 2022-01-25 ENCOUNTER — OFFICE VISIT (OUTPATIENT)
Dept: FAMILY MEDICINE CLINIC | Facility: CLINIC | Age: 57
End: 2022-01-25
Payer: COMMERCIAL

## 2022-01-25 ENCOUNTER — HOSPITAL ENCOUNTER (OUTPATIENT)
Dept: RADIOLOGY | Facility: HOSPITAL | Age: 57
Discharge: HOME | End: 2022-01-25
Payer: COMMERCIAL

## 2022-01-25 ENCOUNTER — APPOINTMENT (OUTPATIENT)
Dept: LAB | Facility: HOSPITAL | Age: 57
End: 2022-01-25
Payer: COMMERCIAL

## 2022-01-25 ENCOUNTER — HOSPITAL ENCOUNTER (OUTPATIENT)
Dept: ULTRASOUND IMAGING | Facility: HOSPITAL | Age: 57
Discharge: HOME | End: 2022-01-25
Payer: COMMERCIAL

## 2022-01-25 ENCOUNTER — TELEPHONE (OUTPATIENT)
Dept: FAMILY MEDICINE CLINIC | Facility: CLINIC | Age: 57
End: 2022-01-25

## 2022-01-25 VITALS
OXYGEN SATURATION: 98 % | TEMPERATURE: 99.3 F | WEIGHT: 282 LBS | BODY MASS INDEX: 49.96 KG/M2 | DIASTOLIC BLOOD PRESSURE: 90 MMHG | SYSTOLIC BLOOD PRESSURE: 160 MMHG | HEIGHT: 63 IN | HEART RATE: 83 BPM

## 2022-01-25 DIAGNOSIS — D86.9 SARCOID: ICD-10-CM

## 2022-01-25 DIAGNOSIS — R10.11 RIGHT UPPER QUADRANT ABDOMINAL PAIN: ICD-10-CM

## 2022-01-25 DIAGNOSIS — R07.89 STERNAL PAIN: ICD-10-CM

## 2022-01-25 DIAGNOSIS — R16.0 ENLARGED LIVER: ICD-10-CM

## 2022-01-25 DIAGNOSIS — U07.1 COVID-19: ICD-10-CM

## 2022-01-25 DIAGNOSIS — R07.89 STERNAL PAIN: Primary | ICD-10-CM

## 2022-01-25 LAB
ALBUMIN SERPL BCP-MCNC: 4.7 G/DL (ref 3.4–4.8)
ALP SERPL-CCNC: 69.8 U/L (ref 10–129)
ALT SERPL W P-5'-P-CCNC: 34 U/L (ref 5–63)
AMYLASE SERPL-CCNC: 54.1 IU/L (ref 28–100)
ANION GAP SERPL CALCULATED.3IONS-SCNC: 9 MMOL/L (ref 4–13)
AST SERPL W P-5'-P-CCNC: 19 U/L (ref 15–41)
BASOPHILS # BLD AUTO: 0.04 THOUSANDS/ΜL (ref 0–0.1)
BASOPHILS NFR BLD AUTO: 1 % (ref 0–1)
BILIRUB SERPL-MCNC: 0.58 MG/DL (ref 0.3–1.2)
BUN SERPL-MCNC: 17 MG/DL (ref 6–20)
CALCIUM SERPL-MCNC: 10.2 MG/DL (ref 8.4–10.2)
CHLORIDE SERPL-SCNC: 103 MMOL/L (ref 96–108)
CO2 SERPL-SCNC: 27 MMOL/L (ref 22–33)
CREAT SERPL-MCNC: 1.15 MG/DL (ref 0.5–1.2)
CRP SERPL QL: 0.9 MG/DL (ref 0–1)
D DIMER PPP FEU-MCNC: 0.31 UG/ML FEU
EOSINOPHIL # BLD AUTO: 0.24 THOUSAND/ΜL (ref 0–0.61)
EOSINOPHIL NFR BLD AUTO: 4 % (ref 0–6)
ERYTHROCYTE [DISTWIDTH] IN BLOOD BY AUTOMATED COUNT: 13.1 % (ref 11.6–15.1)
ERYTHROCYTE [SEDIMENTATION RATE] IN BLOOD: 17 MM/HOUR (ref 0–20)
GFR SERPL CREATININE-BSD FRML MDRD: 70 ML/MIN/1.73SQ M
GLUCOSE P FAST SERPL-MCNC: 103 MG/DL (ref 70–105)
HCT VFR BLD AUTO: 47.8 % (ref 36.5–49.3)
HGB BLD-MCNC: 16.2 G/DL (ref 12–17)
IMM GRANULOCYTES # BLD AUTO: 0.1 THOUSAND/UL (ref 0–0.2)
IMM GRANULOCYTES NFR BLD AUTO: 2 % (ref 0–2)
LIPASE SERPL-CCNC: 27 U/L (ref 13–60)
LYMPHOCYTES # BLD AUTO: 1.26 THOUSANDS/ΜL (ref 0.6–4.47)
LYMPHOCYTES NFR BLD AUTO: 18 % (ref 14–44)
MCH RBC QN AUTO: 28.8 PG (ref 26.8–34.3)
MCHC RBC AUTO-ENTMCNC: 33.9 G/DL (ref 31.4–37.4)
MCV RBC AUTO: 85 FL (ref 82–98)
MONOCYTES # BLD AUTO: 0.65 THOUSAND/ΜL (ref 0.17–1.22)
MONOCYTES NFR BLD AUTO: 10 % (ref 4–12)
NEUTROPHILS # BLD AUTO: 4.54 THOUSANDS/ΜL (ref 1.85–7.62)
NEUTS SEG NFR BLD AUTO: 65 % (ref 43–75)
NRBC BLD AUTO-RTO: 0 /100 WBCS
PLATELET # BLD AUTO: 232 THOUSANDS/UL (ref 149–390)
PMV BLD AUTO: 9.6 FL (ref 8.9–12.7)
POTASSIUM SERPL-SCNC: 4.4 MMOL/L (ref 3.5–5)
PROT SERPL-MCNC: 8 G/DL (ref 6.4–8.3)
RBC # BLD AUTO: 5.62 MILLION/UL (ref 3.88–5.62)
SODIUM SERPL-SCNC: 139 MMOL/L (ref 133–145)
WBC # BLD AUTO: 6.83 THOUSAND/UL (ref 4.31–10.16)

## 2022-01-25 PROCEDURE — 85025 COMPLETE CBC W/AUTO DIFF WBC: CPT

## 2022-01-25 PROCEDURE — 86140 C-REACTIVE PROTEIN: CPT

## 2022-01-25 PROCEDURE — 99214 OFFICE O/P EST MOD 30 MIN: CPT | Performed by: NURSE PRACTITIONER

## 2022-01-25 PROCEDURE — 1036F TOBACCO NON-USER: CPT | Performed by: NURSE PRACTITIONER

## 2022-01-25 PROCEDURE — 71046 X-RAY EXAM CHEST 2 VIEWS: CPT

## 2022-01-25 PROCEDURE — 85652 RBC SED RATE AUTOMATED: CPT

## 2022-01-25 PROCEDURE — 3725F SCREEN DEPRESSION PERFORMED: CPT | Performed by: NURSE PRACTITIONER

## 2022-01-25 PROCEDURE — 3008F BODY MASS INDEX DOCD: CPT | Performed by: NURSE PRACTITIONER

## 2022-01-25 PROCEDURE — 80053 COMPREHEN METABOLIC PANEL: CPT

## 2022-01-25 PROCEDURE — 76700 US EXAM ABDOM COMPLETE: CPT

## 2022-01-25 PROCEDURE — 82150 ASSAY OF AMYLASE: CPT

## 2022-01-25 PROCEDURE — 83690 ASSAY OF LIPASE: CPT

## 2022-01-25 PROCEDURE — 85379 FIBRIN DEGRADATION QUANT: CPT

## 2022-01-25 PROCEDURE — 36415 COLL VENOUS BLD VENIPUNCTURE: CPT

## 2022-01-25 RX ORDER — CYCLOBENZAPRINE HCL 5 MG
5 TABLET ORAL
Qty: 10 TABLET | Refills: 0 | Status: SHIPPED | OUTPATIENT
Start: 2022-01-25 | End: 2022-02-18 | Stop reason: ALTCHOICE

## 2022-01-25 RX ORDER — NAPROXEN 500 MG/1
500 TABLET ORAL 2 TIMES DAILY WITH MEALS
Qty: 60 TABLET | Refills: 5 | Status: SHIPPED | OUTPATIENT
Start: 2022-01-25 | End: 2022-02-18 | Stop reason: ALTCHOICE

## 2022-01-25 RX ORDER — LIDOCAINE 50 MG/G
1 PATCH TOPICAL DAILY
Qty: 30 PATCH | Refills: 1 | Status: SHIPPED | OUTPATIENT
Start: 2022-01-25 | End: 2022-02-18 | Stop reason: ALTCHOICE

## 2022-01-25 NOTE — PATIENT INSTRUCTIONS
Non-Alcoholic Fatty Liver Disease   WHAT YOU NEED TO KNOW:   Non-alcoholic fatty liver disease (NAFLD) is a buildup of fat in your liver from a condition other than alcohol abuse  DISCHARGE INSTRUCTIONS:   Call your local emergency number (911 in the [address]) or have someone call if:   · You have shortness of breath  · You have trouble thinking clearly or are confused  Return to the emergency department if:   · You feel lightheaded or faint  · You have shaking, chills, and a fever  Call your doctor or liver specialist if:   · You have more pain or swelling in your abdomen  · You feel more tired than usual     · You bruise or bleed easily  · Your skin or the whites of your eyes look yellow  · You have questions or concerns about your condition or care  Medicines:   · Medicines  may be given to manage blood sugar or cholesterol levels  · Take your medicine as directed  Contact your healthcare provider if you think your medicine is not helping or if you have side effects  Tell him or her if you are allergic to any medicine  Keep a list of the medicines, vitamins, and herbs you take  Include the amounts, and when and why you take them  Bring the list or the pill bottles to follow-up visits  Carry your medicine list with you in case of an emergency  Manage NAFLD:   · Maintain a healthy weight  Ask your healthcare provider how what a healthy weight is for you  Ask him or her to help you create a weight loss plan if you are overweight  · Exercise as directed  Aerobic exercise 3 times a week for 20 to 45 minutes can help decrease fat buildup in your liver  Examples are cycling, brisk walking, and jogging  Ask your healthcare provider about the best exercise plan for you  · Eat a variety of healthy foods  Healthy foods include vegetables, fruit, whole-grain breads, low-fat dairy products, beans, lean meats, and fish   Foods low in simple carbohydrates, high-fructose corn syrup, and trans fat may help decrease fat buildup in your liver  · Do not drink alcohol  Alcohol may make NAFLD worse and harm your liver  Ask your healthcare provider for information if you need help to quit drinking  Follow up with your doctor as directed: You may need to return for more tests  You may also be referred to a specialist  Write down your questions so you remember to ask them during your visits  © Copyright [employer] 2021 Information is for End User's use only and may not be sold, redistributed or otherwise used for commercial purposes  All illustrations and images included in CareNotes® are the copyrighted property of [employer]  or [address] [place], [place] [address]   The above information is an  only  It is not intended as medical advice for individual conditions or treatments  Talk to your doctor, nurse or pharmacist before following any medical regimen to see if it is safe and effective for you

## 2022-01-25 NOTE — TELEPHONE ENCOUNTER
Pt called - the US results are in and he was hoping they could be reviewed and something for his discomforted could be called into the pharmacy

## 2022-01-25 NOTE — PROGRESS NOTES
BMI Counseling: Body mass index is 49 95 kg/m²  The BMI is above normal  Nutrition recommendations include decreasing portion sizes, encouraging healthy choices of fruits and vegetables, decreasing fast food intake, consuming healthier snacks, limiting drinks that contain sugar, moderation in carbohydrate intake, increasing intake of lean protein, reducing intake of saturated and trans fat and reducing intake of cholesterol  Exercise recommendations include vigorous physical activity 75 minutes/week, exercising 3-5 times per week and strength training exercises  No pharmacotherapy was ordered  Patient referred to PCP  Rationale for BMI follow-up plan is due to patient being overweight or obese  Depression Screening and Follow-up Plan: Patient was screened for depression during today's encounter  They screened negative with a PHQ-2 score of 0  Assessment/Plan:    Patient is a 59-year-old male usually sees Dr Christina Irizarry in office for follow-up complain of right upper abdominal pain right ribcage pain and midsternal  upper abdominal pain- epigastric pain  Does feel a when he takes deep breaths on exam noted that the liver is enlarged and the edges are under the ribcage  Discuss workup at this point I will order an ultrasound of the liver and abdomen follow-up blood work and a chest x-ray just because he recently had COVID  No sternal and rib cage pains are not atypical for COVID and post COVID discussed taking some anti-inflammatories for now and muscle relaxers  Ultrasound done same day and reviewed showing hepatic steatosis and enlarged liver edges   Blood work done blood counts are normal liver functions are normal kidney functions and electrolytes are within range  Still pending chest x-ray  For now discussed low-fat low-cholesterol diet weight lost adequate hydration, small frequent meals and avoiding things that could cause a lot of bloating and distension  Increase activity as tolerated  States he is up to date with colonoscopy had 1 done a year ago in [place] and was negative and normal denies any bowel issues nausea or vomiting,     I will call him as soon as I get the chest x-ray results continue Protonix on empty stomach take naproxen twice a day with food and muscle relaxer at night    If not better within 1-2 weeks patient is to come back for follow-up     Problem List Items Addressed This Visit        Other    COVID-19    Relevant Orders    Comprehensive metabolic panel (Completed)    CBC and differential (Completed)    D-dimer, quantitative (Completed)    Sedimentation rate, automated (Completed)    C-reactive protein (Completed)    XR chest pa & lateral      Other Visit Diagnoses     Sternal pain    -  Primary    right sided sternal pain    Relevant Medications    naproxen (Naprosyn) 500 mg tablet    cyclobenzaprine (FLEXERIL) 5 mg tablet    lidocaine (Lidoderm) 5 %    Other Relevant Orders    Comprehensive metabolic panel (Completed)    CBC and differential (Completed)    D-dimer, quantitative (Completed)    Sedimentation rate, automated (Completed)    C-reactive protein (Completed)    XR chest pa & lateral    US abdomen complete (Completed)    Sarcoid        Relevant Orders    Comprehensive metabolic panel (Completed)    CBC and differential (Completed)    D-dimer, quantitative (Completed)    Sedimentation rate, automated (Completed)    C-reactive protein (Completed)    XR chest pa & lateral    Right upper quadrant abdominal pain        Relevant Medications    naproxen (Naprosyn) 500 mg tablet    cyclobenzaprine (FLEXERIL) 5 mg tablet    lidocaine (Lidoderm) 5 %    Other Relevant Orders    Lipase (Completed)    Amylase (Completed)    US abdomen complete (Completed)    BMI 45 0-49 9, adult (HCC)        Enlarged liver        hepatic steatosis   discussed low fat diet and weight loss   denies any alcohol intake     Relevant Medications    naproxen (Naprosyn) 500 mg tablet cyclobenzaprine (FLEXERIL) 5 mg tablet    lidocaine (Lidoderm) 5 %            Subjective:      Patient ID: Billy Monson is a 64 y o  male  HPI    The following portions of the patient's history were reviewed and updated as appropriate:   Past Medical History:  He has a past medical history of Anxiety, GERD (gastroesophageal reflux disease), Joint pain in both hands (2/19/2021), Kidney stone, Kidney stones (2/19/2021), Mass of anterior abdominal wall (2/19/2021), MI (myocardial infarction) ([facility] [address] ), Morbid obesity with BMI of 50 0-59 9, adult ([facility] [address] ), Need for hepatitis B vaccination (7/15/2021), Need for MMR vaccine (7/15/2021), Nonimmune to hepatitis B virus (3/4/2021), Parotid gland enlargement (2/19/2021), Prediabetes (3/4/2021), Primary osteoarthritis of left knee (7/15/2021), RUQ pain (7/15/2021), Sarcoidosis (2/19/2021), Sleep apnea, and Soft tissue mass (2/19/2021)  ,  _______________________________________________________________________  Medical Problems:  does not have any pertinent problems on file ,  _______________________________________________________________________  Past Surgical History:   has a past surgical history that includes Knee arthroscopy; Hand surgery (Bilateral, 2019); Vasectomy; Colonoscopy; Bronchoscopy; EGD; and pr excision tumor soft tissue back/flank subq 3+cm (N/A, 4/8/2021)  ,  _______________________________________________________________________  Family History:  family history includes Cancer in his father; Diabetes in his maternal grandfather and mother; Heart disease in his father, mother, paternal aunt, and paternal uncle ,  _______________________________________________________________________  Social History:   reports that he has never smoked  He has never used smokeless tobacco  He reports that he does not drink alcohol and does not use drugs  ,  _______________________________________________________________________  Allergies:  is allergic to azithromycin and erythromycin     _______________________________________________________________________  Current Outpatient Medications   Medication Sig Dispense Refill    Ascorbic Acid (vitamin C) 100 MG tablet Take 100 mg by mouth daily      Cholecalciferol (VITAMIN D PO) Take 10,000 Units by mouth every other day       escitalopram (Lexapro) 10 mg tablet Take 1 tablet (10 mg total) by mouth daily 90 tablet 1    Multiple Vitamins-Minerals (multivitamin with minerals) tablet Take 1 tablet by mouth daily      pantoprazole (PROTONIX) 40 mg Take 40 mg by mouth daily Protonix 40 MG Oral Packet  Refills: 0  Active       cyclobenzaprine (FLEXERIL) 5 mg tablet Take 1 tablet (5 mg total) by mouth daily at bedtime as needed for muscle spasms (abdominal pain) for up to 10 days 10 tablet 0    fluticasone (FLOVENT HFA) 110 MCG/ACT inhaler Inhale 4 puffs 2 (two) times a day for 14 days Rinse mouth after use  12 g 0    lidocaine (Lidoderm) 5 % Apply 1 patch topically daily Remove & Discard patch within 12 hours or as directed by MD 30 patch 1    naproxen (Naprosyn) 500 mg tablet Take 1 tablet (500 mg total) by mouth 2 (two) times a day with meals for 10 days 60 tablet 5     No current facility-administered medications for this visit      _______________________________________________________________________  Review of Systems   Constitutional: Positive for fatigue  Negative for chills and fever  HENT: Negative for congestion, postnasal drip and sinus pressure  Eyes: Negative  Respiratory: Negative for cough and shortness of breath  Right rib pain and RUQ abdominal pain    Genitourinary: Negative for difficulty urinating and flank pain  Musculoskeletal: Positive for arthralgias and myalgias  Right upper quadrant pain and rib cage pain   Neurological: Negative for headaches  Psychiatric/Behavioral: Negative for sleep disturbance and suicidal ideas  The patient is not nervous/anxious  Objective:  Vitals:    01/25/22 1122   BP: 160/90   BP Location: Left arm   Patient Position: Sitting   Cuff Size: Standard   Pulse: 83   Temp: 99 3 °F (37 4 °C)   TempSrc: Tympanic   SpO2: 98%   Weight: 128 kg (282 lb)   Height: 5' 3" (1 6 m)     Body mass index is 49 95 kg/m²  Physical Exam  Vitals and nursing note reviewed  Constitutional:       Appearance: He is obese  Cardiovascular:      Rate and Rhythm: Normal rate and regular rhythm  Heart sounds: Normal heart sounds  Pulmonary:      Effort: Pulmonary effort is normal       Breath sounds: Normal breath sounds  Abdominal:      General: Bowel sounds are normal  There is distension  Tenderness: There is abdominal tenderness in the right upper quadrant  Hernia: There is no hernia in the ventral area  Comments: RUQ abdominal pain   Liver appears enlarged    Neurological:      Mental Status: He is alert and oriented to person, place, and time  Psychiatric:         Mood and Affect: Mood normal          Behavior: Behavior normal        US abdomen complete    Status: Final result       PACS Images     Show images for US abdomen complete    Study Result    Narrative & Impression   ABDOMEN ULTRASOUND, COMPLETE      INDICATION:   R07 89: Other chest pain  R10 11: Right upper quadrant pain      COMPARISON: Chest CT 7/22/2021  A CT chest/abdomen/pelvis 3/2/2021      TECHNIQUE:   Real-time ultrasound of the abdomen was performed with a curvilinear transducer with both volumetric sweeps and still imaging techniques      FINDINGS:     PANCREAS:  Visualized portions of the pancreas are within normal limits      AORTA AND IVC:  Visualized portions are normal for patient age      LIVER:  Size:  Mildly enlarged  The liver measures 19 5 cm in the midclavicular line  Contour:  Surface contour is smooth  Parenchyma:   There is marked diffuse increased echogenicity with smooth echotexture and significant beam attenuation with loss of periportal echogenicity  Most consistent with severe hepatic steatosis  No evidence of suspicious mass  Limited imaging of the main portal vein shows it to be patent and hepatopetal      BILIARY:  No gallbladder findings  No intrahepatic biliary dilatation  CBD measures 4 mm  No choledocholithiasis      KIDNEY:   Right kidney measures 10 4 x 5 0 x 4 8 cm  Within normal limits      Left kidney measures 11 1 x 4 3 x 4 6 cm  Within normal limits      SPLEEN:   Measures 15 0 x 17 3 x 6 5 cm  Within normal limits      ASCITES:  None      IMPRESSION:     Hepatic steatosis and hepatosplenomegaly      Otherwise, within normal limits            Workstation performed: [id number]       Amylase  Order: [id number]   Status: Final result     Visible to patient: Yes (not seen)     Next appt: None     Dx: Right upper quadrant abdominal pain     1 Result Note     1 Patient Communication    Component Ref Range & Units 1/25/22  1:23 PM 8/29/19  1:27 PM   Amylase 28 - 100 IU/L 54 1  74 R               Specimen Collected: 01/25/22  1:23 PM Last Resulted: 01/25/22  2:34            Amylase  Order: [id number]   Status: Final result     Visible to patient: Yes (not seen)     Next appt: None     Dx: Right upper quadrant abdominal pain     1 Result Note     1 Patient Communication    Component Ref Range & Units 1/25/22  1:23 PM 8/29/19  1:27 PM   Amylase 28 - 100 IU/L 54 1  74 R               Specimen Collected: 01/25/22  1:23 PM Last Resulted: 01/25/22  2:34          C-reactive protein  Order: [id number]   Status: Final result     Visible to patient: Yes (seen)     Next appt: None     Dx: Sarcoid; Sternal pain; COVID-19     1 Result Note     1 Patient Communication    Component Ref Range & Units 1/25/22  1:23 PM 2/20/21  8:49 AM   CRP 0 0 - 1 0 mg/dL 0 9            Sedimentation rate, automated  Order: [id number]   Status: Final result     Visible to patient: Yes (seen)     Next appt: None     Dx:  Sarcoid; Sternal pain; COVID-19     0 Result Notes    Component Ref Range & Units 1/25/22  1:23 PM 2/20/21  8:49 AM   Sed Rate 0 - 20 mm/hour 17  32 High         D-dimer, quantitative  Order: [id number]   Status: Final result     Visible to patient: Yes (seen)     Next appt: None     Dx: Sarcoid; Sternal pain; COVID-19     1 Result Note     1 Patient Communication     Ref Range & Units 1/25/22  1:23 PM   D-Dimer, Quant <0 50 ug/ml FEU 0 31    Comment: Reference and upper limits to exclude DVT and PE are the same   Do not use to exclude if clinical symptoms are present  CBC and differential  Order: [id number]   Status: Final result     Visible to patient: Yes (seen)     Next appt: None     Dx:  Sarcoid; Sternal pain; COVID-19     2 Result Notes     1 Patient Communication    Component Ref Range & Units 1/25/22  1:23 PM 2/20/21  8:49 AM 8/29/19  1:27 PM 10/1/17  8:21 PM 12/28/16  9:51 AM 6/17/16 12:19 PM   WBC 4 31 - 10 16 Thousand/uL 6 83  5 48  8 30  8 48  6 32  6 56    RBC 3 88 - 5 62 Million/uL 5 62  5 36  5 47  5 29  5 27  5 05    Hemoglobin 12 0 - 17 0 g/dL 16 2  15 3  15 8  14 9  14 9  14 9    Hematocrit 36 5 - 49 3 % 47 8  46 9  47 7  44 4  44 9  43 1    MCV 82 - 98 fL 85  88  87  84  85  85    MCH 26 8 - 34 3 pg 28 8  28 5  28 9  28 2  28 3  29 5    MCHC 31 4 - 37 4 g/dL 33 9  32 6  33 1  33 6  33 2  34 6    RDW 11 6 - 15 1 % 13 1  13 2  13 2  13 2  13 6  13 5    MPV 8 9 - 12 7 fL 9 6  9 7  9 4  9 5  9 7  10 1    Platelets 645 - 623 Thousands/uL 232  224  246  255  243  235    nRBC /100 WBCs 0  0  0       Neutrophils Relative 43 - 75 % 65  68  74  74  75  71    Immat GRANS % 0 - 2 % 2  1  1       Lymphocytes Relative 14 - 44 % 18  18  13 Low   13 Low   14  16    Monocytes Relative 4 - 12 % 10  8  8  9  7  8    Eosinophils Relative 0 - 6 % 4  4  3  3  3  4    Basophils Relative 0 - 1 % 1  1  1  1  1  1    Neutrophils Absolute 1 85 - 7 62 Thousands/µL 4 54  3 76  6 23  6 41  4 73  4 78    Immature Grans Absolute 0 00 - 0 20 Thousand/uL 0 10  0 04 0 07       Lymphocytes Absolute 0 60 - 4 47 Thousands/µL 1 26  0 99  1 08  1 09  0 90  1 02    Monocytes Absolute 0 17 - 1 22 Thousand/µL 0 65  0 41  0 62  0 73  0 45  0 49    Eosinophils Absolute 0 00 - 0 61 Thousand/µL 0 24  0 23  0 24  0 21  0 21  0 24    Basophils Absolute 0 00 - 0 10 Thousands/µL 0 04  0 05  0 06  0 04             Comprehensive metabolic panel  Order: [id number]   Status: Final result     Visible to patient: Yes (seen)     Next appt: None     Dx: Sarcoid; Sternal pain; COVID-19     1 Result Note     1 Patient Communication    Component Ref Range & Units 1/25/22  1:23 PM 9/30/21  9:35 AM 2/20/21  8:49 AM 8/29/19  1:27 PM 10/1/17  8:21 PM 12/28/16 10:32 AM 6/17/16 12:19 PM   Sodium 133 - 145 mmol/L 139  137 R  141 R  137 R  135 Low  R  141 R  140 R    Potassium 3 5 - 5 0 mmol/L 4 4  4 1 R  4 2 R  4 1 R  3 8 R  4 1 R  3 8 R    Chloride 96 - 108 mmol/L 103  107 R  105 R  102 R  101 R  104 R  105 R    CO2 22 - 33 mmol/L 27  28 R  26 R  27 R  28 R  27 R  28 R    ANION GAP 4 - 13 mmol/L 9  2 Low   10  8  6  10  7    BUN 6 - 20 mg/dL 17  12 R  16 R  18 R  15 R  12 R  18 R    Creatinine 0 50 - 1 20 mg/dL 1 15  1 03 R, CM  1 19 R, CM  1 05 R, CM  1 27 R, CM  1 09 R, CM  1 20 R, CM    Comment: Standardized to IDMS reference method   Glucose, Fasting 70 - 105 mg/dL 103  124 High  R, CM  133 High  R, CM        Comment: Specimen collection should occur prior to Sulfasalazine administration due to the potential for falsely depressed results  Specimen collection should occur prior to Sulfapyridine administration due to the potential for falsely elevated results  Calcium 8 4 - 10 2 mg/dL 10 2  9 9 R  9 4 R  9 3 R  8 9 R  9 1 R  9 0 R    AST 15 - 41 U/L 19   20 R, CM  19 R, CM  10 R, CM  18 R     Comment: Specimen collection should occur prior to Sulfasalazine administration due to the potential for falsely depressed results      ALT 5 - 63 U/L 34   36 R, CM  33 R, CM  34 R, CM  46 R     Comment: Specimen collection should occur prior to Sulfasalazine administration due to the potential for falsely depressed results      Alkaline Phosphatase 10 - 129 U/L 69 8   92 R  81 R  77 R  85 R     Total Protein 6 4 - 8 3 g/dL 8 0   7 9 R  7 9 R  7 5 R  7 4 R     Albumin 3 4 - 4 8 g/dL 4 7   4 0 R  3 8 R  3 7 R  3 7 R     Total Bilirubin 0 30 - 1 20 mg/dL 0 58   0 47 R, CM  0 30 R  0 20 R  0 30 R     eGFR ml/min/1 73sq m 70  81  68  80

## 2022-02-04 ENCOUNTER — TELEPHONE (OUTPATIENT)
Dept: FAMILY MEDICINE CLINIC | Facility: CLINIC | Age: 57
End: 2022-02-04

## 2022-02-04 DIAGNOSIS — K21.9 GASTROESOPHAGEAL REFLUX DISEASE, UNSPECIFIED WHETHER ESOPHAGITIS PRESENT: Primary | ICD-10-CM

## 2022-02-04 RX ORDER — PANTOPRAZOLE SODIUM 40 MG/1
40 TABLET, DELAYED RELEASE ORAL
Qty: 90 TABLET | Refills: 3 | Status: SHIPPED | OUTPATIENT
Start: 2022-02-04

## 2022-02-16 ENCOUNTER — VBI (OUTPATIENT)
Dept: ADMINISTRATIVE | Facility: OTHER | Age: 57
End: 2022-02-16

## 2022-04-13 ENCOUNTER — TELEPHONE (OUTPATIENT)
Dept: FAMILY MEDICINE CLINIC | Facility: CLINIC | Age: 57
End: 2022-04-13

## 2022-04-13 DIAGNOSIS — T78.40XA ALLERGY, INITIAL ENCOUNTER: Primary | ICD-10-CM

## 2022-04-13 NOTE — TELEPHONE ENCOUNTER
Please let pt know I have referral for allergist/immunologist in chart for pt, they can test him for angioedema   Pt can start claritin 10 mg daily when he feels the tounge gets worse, it can be allergies

## 2022-04-13 NOTE — TELEPHONE ENCOUNTER
Patient called 3 of his siblings have angioadema   ---he wanted to know how they test for this and he is interested in getting tested    Also he has tongue swelling that comes and goes for the past month or so   It is worse in the evening and if he gets nervous it gets worse

## 2022-06-15 DIAGNOSIS — F41.9 ANXIETY: ICD-10-CM

## 2022-06-15 RX ORDER — ESCITALOPRAM OXALATE 10 MG/1
TABLET ORAL
Qty: 90 TABLET | Refills: 1 | Status: SHIPPED | OUTPATIENT
Start: 2022-06-15

## 2022-08-11 ENCOUNTER — TELEPHONE (OUTPATIENT)
Dept: FAMILY MEDICINE CLINIC | Facility: CLINIC | Age: 57
End: 2022-08-11

## 2022-08-11 NOTE — TELEPHONE ENCOUNTER
Pt called and is concerned that he might be dealing with some long term COVID side effects  He states that his breathing is just not the same and he is starting to become concerned  He is asking to see you, however you're quite booked today and tomorrow  Please advise  [Follow-Up: _____] : a [unfilled] follow-up visit

## 2022-08-12 ENCOUNTER — OFFICE VISIT (OUTPATIENT)
Dept: FAMILY MEDICINE CLINIC | Facility: CLINIC | Age: 57
End: 2022-08-12
Payer: COMMERCIAL

## 2022-08-12 VITALS
BODY MASS INDEX: 49.26 KG/M2 | SYSTOLIC BLOOD PRESSURE: 158 MMHG | TEMPERATURE: 98.2 F | DIASTOLIC BLOOD PRESSURE: 82 MMHG | WEIGHT: 278 LBS | HEIGHT: 63 IN | OXYGEN SATURATION: 98 % | HEART RATE: 79 BPM

## 2022-08-12 DIAGNOSIS — D86.9 SARCOIDOSIS: Primary | ICD-10-CM

## 2022-08-12 DIAGNOSIS — E79.0 ELEVATED BLOOD URIC ACID LEVEL: ICD-10-CM

## 2022-08-12 DIAGNOSIS — E53.8 CYANOCOBALAMIN DEFICIENCY: ICD-10-CM

## 2022-08-12 DIAGNOSIS — Z78.9 NONIMMUNE TO HEPATITIS B VIRUS: ICD-10-CM

## 2022-08-12 DIAGNOSIS — Z28.39 IMMUNIZATION DEFICIENCY: ICD-10-CM

## 2022-08-12 DIAGNOSIS — E78.5 HYPERLIPIDEMIA, UNSPECIFIED HYPERLIPIDEMIA TYPE: ICD-10-CM

## 2022-08-12 DIAGNOSIS — R03.0 ELEVATED BLOOD PRESSURE READING: ICD-10-CM

## 2022-08-12 DIAGNOSIS — R73.03 PREDIABETES: ICD-10-CM

## 2022-08-12 DIAGNOSIS — Z12.5 SCREENING PSA (PROSTATE SPECIFIC ANTIGEN): ICD-10-CM

## 2022-08-12 DIAGNOSIS — R06.09 DYSPNEA ON EXERTION: ICD-10-CM

## 2022-08-12 DIAGNOSIS — E66.01 MORBID OBESITY WITH BMI OF 45.0-49.9, ADULT (HCC): ICD-10-CM

## 2022-08-12 DIAGNOSIS — E55.9 VITAMIN D DEFICIENCY: ICD-10-CM

## 2022-08-12 DIAGNOSIS — U07.1 COVID-19: ICD-10-CM

## 2022-08-12 DIAGNOSIS — Z23 ENCOUNTER FOR IMMUNIZATION: ICD-10-CM

## 2022-08-12 PROBLEM — Z20.822 EXPOSURE TO COVID-19 VIRUS: Status: RESOLVED | Noted: 2021-11-15 | Resolved: 2022-08-12

## 2022-08-12 PROBLEM — T78.40XA ALLERGIES: Status: RESOLVED | Noted: 2022-04-13 | Resolved: 2022-08-12

## 2022-08-12 PROCEDURE — 99215 OFFICE O/P EST HI 40 MIN: CPT | Performed by: FAMILY MEDICINE

## 2022-08-12 PROCEDURE — 90715 TDAP VACCINE 7 YRS/> IM: CPT | Performed by: FAMILY MEDICINE

## 2022-08-12 PROCEDURE — 90746 HEPB VACCINE 3 DOSE ADULT IM: CPT | Performed by: FAMILY MEDICINE

## 2022-08-12 PROCEDURE — 90472 IMMUNIZATION ADMIN EACH ADD: CPT | Performed by: FAMILY MEDICINE

## 2022-08-12 PROCEDURE — 90632 HEPA VACCINE ADULT IM: CPT | Performed by: FAMILY MEDICINE

## 2022-08-12 PROCEDURE — 90471 IMMUNIZATION ADMIN: CPT | Performed by: FAMILY MEDICINE

## 2022-08-12 RX ORDER — FLUTICASONE PROPIONATE AND SALMETEROL XINAFOATE 115; 21 UG/1; UG/1
2 AEROSOL, METERED RESPIRATORY (INHALATION) 2 TIMES DAILY
Qty: 12 G | Refills: 1 | Status: SHIPPED | OUTPATIENT
Start: 2022-08-12

## 2022-08-12 RX ORDER — PREDNISONE 20 MG/1
40 TABLET ORAL DAILY
Qty: 14 TABLET | Refills: 0 | Status: SHIPPED | OUTPATIENT
Start: 2022-08-12 | End: 2022-08-19

## 2022-08-12 NOTE — PROGRESS NOTES
Assessment/Plan:  Patient advised due to severe fatty liver it may compress his right lung and causes Atelectasis also with history of sarcoidosis his lungs are right weak capacity and with COVID infection it also put him at risk for complication  This point will get echocardiogram his heart to check for cardiomyopathy  Will get blood work done just for  sed rate D-dimer  Will start patient back on prednisone inhaler  Pending results we may need a CT scan of his lungs again  He had to go back to see pulmonologist   Close monitor needed  Tdap hep a hep B vaccine given today today to finish the series  Next visit will offer shingle vaccine  I have spent 40 minutes with Patient  today in which greater than 50% of this time was spent in counseling/coordination of care regarding Prognosis, Risks and benefits of tx options and Intructions for management             Problem List Items Addressed This Visit        Other    Morbid obesity with BMI of 45 0-49 9, adult ([facility] [address] )    Relevant Orders    TSH, 3rd generation with Free T4 reflex    Cortisol Level, AM Specimen    Hyperlipidemia    Relevant Orders    Comprehensive metabolic panel    Lipid Panel with Direct LDL reflex    Vitamin D deficiency    Relevant Orders    Vitamin D 25 hydroxy    Sarcoidosis - Primary    Relevant Medications    predniSONE 20 mg tablet    fluticasone-salmeterol (Advair HFA) 115-21 MCG/ACT inhaler    Other Relevant Orders    D-dimer, quantitative    Sedimentation rate, automated    C-reactive protein    Prediabetes    Relevant Orders    Hemoglobin A1C    Nonimmune to hepatitis B virus    Immunization deficiency    Elevated blood uric acid level    Cyanocobalamin deficiency    Relevant Orders    Vitamin B12    COVID-19    Relevant Medications    predniSONE 20 mg tablet    fluticasone-salmeterol (Advair HFA) 115-21 MCG/ACT inhaler    Other Relevant Orders    Echo complete w/ contrast if indicated      Other Visit Diagnoses     Dyspnea on exertion        Relevant Medications    predniSONE 20 mg tablet    fluticasone-salmeterol (Advair HFA) 115-21 MCG/ACT inhaler    Other Relevant Orders    Echo complete w/ contrast if indicated    CBC and differential    NT-BNP PRO    D-dimer, quantitative    Encounter for immunization        Relevant Orders    HEPATITIS A VACCINE ADULT IM (Completed)    Hepatitis B Vaccine Adult IM (Completed)    TDAP VACCINE GREATER THAN OR EQUAL TO 8YO IM (Completed)    Elevated blood pressure reading        Relevant Orders    Microalbumin,Urine    UA w Reflex to Microscopic w Reflex to Culture    Uric acid    Screening PSA (prostate specific antigen)        Relevant Orders    PSA, Total Screen            Subjective:      Patient ID: Nito Birmingham is a 62 y o  male  59-year-old male presenting complain of post COVID symptom lingering  He had COVID infection in December 29, 2021 was prescribed prednisone inhaler antibiotic which he never took  Then a month later he was seen in the office with shortness of breath on exertion coughing fatigue chest x-ray show atelectasis of the middle lung field on the right side CT scan shows severe hepatomegaly blood work was all normal   Since then he has progressively shortness of breath on exertion worsening it affect his work he could not cook anymore and now he is concerned because he with wheeze and cough and short of breath  No chest pain no fever no chills no night sweats  He was diagnosed with sarcoidosis in the past   He has hyperlipidemia prediabetes morbid obesity and obstructive sleep apnea  Stop follow-up at all specialist like pulmonologist in the past because he was too busy  His blood pressure is elevated  He is not taking anything for blood pressure    He is up-to-date COVID vaccine did not get booster      The following portions of the patient's history were reviewed and updated as appropriate:   Past Medical History:  He has a past medical history of Anxiety, GERD (gastroesophageal reflux disease), Joint pain in both hands (2/19/2021), Kidney stone, Kidney stones (2/19/2021), Mass of anterior abdominal wall (2/19/2021), MI (myocardial infarction) ([facility] ), Morbid obesity with BMI of 50 0-59 9, adult ([facility] ), Need for hepatitis B vaccination (7/15/2021), Need for MMR vaccine (7/15/2021), Nonimmune to hepatitis B virus (3/4/2021), Parotid gland enlargement (2/19/2021), Prediabetes (3/4/2021), Primary osteoarthritis of left knee (7/15/2021), RUQ pain (7/15/2021), Sarcoidosis (2/19/2021), Sleep apnea, and Soft tissue mass (2/19/2021)  ,  _______________________________________________________________________  Medical Problems:  does not have any pertinent problems on file ,  _______________________________________________________________________  Past Surgical History:   has a past surgical history that includes Knee arthroscopy; Hand surgery (Bilateral, 2019); Vasectomy; Colonoscopy; Bronchoscopy; EGD; and pr excision tumor soft tissue back/flank subq 3+cm (N/A, 4/8/2021)  ,  _______________________________________________________________________  Family History:  family history includes Cancer in his father; Diabetes in his maternal grandfather and mother; Heart disease in his father, mother, paternal aunt, and paternal uncle ,  _______________________________________________________________________  Social History:   reports that he has never smoked  He has never used smokeless tobacco  He reports that he does not drink alcohol and does not use drugs  ,  _______________________________________________________________________  Allergies:  is allergic to azithromycin and erythromycin     _______________________________________________________________________  Current Outpatient Medications   Medication Sig Dispense Refill    Ascorbic Acid (vitamin C) 100 MG tablet Take 100 mg by mouth daily      Cholecalciferol (VITAMIN D PO) Take 10,000 Units by mouth every other day       escitalopram (LEXAPRO) 10 mg tablet TAKE 1 TABLET BY MOUTH EVERY DAY 90 tablet 1    fluticasone-salmeterol (Advair HFA) 115-21 MCG/ACT inhaler Inhale 2 puffs 2 (two) times a day Rinse mouth after use  12 g 1    Multiple Vitamins-Minerals (multivitamin with minerals) tablet Take 1 tablet by mouth daily      pantoprazole (PROTONIX) 40 mg tablet Take 1 tablet (40 mg total) by mouth daily before breakfast 90 tablet 3    predniSONE 20 mg tablet Take 2 tablets (40 mg total) by mouth daily for 7 days 14 tablet 0     No current facility-administered medications for this visit      _______________________________________________________________________  Review of Systems   Constitutional: Negative for activity change, appetite change, fatigue, fever and unexpected weight change  HENT: Negative for dental problem and trouble swallowing  Eyes: Negative for photophobia and visual disturbance  Respiratory: Positive for shortness of breath  Negative for cough and chest tightness  Cardiovascular: Negative for chest pain, palpitations and leg swelling  Gastrointestinal: Negative for abdominal pain, constipation and vomiting  Endocrine: Negative for cold intolerance, polydipsia and polyuria  Genitourinary: Negative for difficulty urinating, frequency and urgency  Musculoskeletal: Negative for arthralgias, joint swelling, myalgias and neck pain  Skin: Negative for color change, rash and wound  Allergic/Immunologic: Negative for environmental allergies  Neurological: Negative for dizziness, weakness and numbness  Hematological: Does not bruise/bleed easily  Psychiatric/Behavioral: Negative for decreased concentration, dysphoric mood, self-injury, sleep disturbance and suicidal ideas           Objective:  Vitals:    08/12/22 0905   BP: 158/82   BP Location: Left arm   Patient Position: Sitting   Cuff Size: Large   Pulse: 79   Temp: 98 2 °F (36 8 °C)   TempSrc: Temporal   SpO2: 98%   Weight: 126 kg (278 lb)   Height: 5' 3" (1 6 m)     Body mass index is 49 25 kg/m²  Physical Exam  Vitals and nursing note reviewed  Constitutional:       Appearance: Normal appearance  He is well-developed  He is obese  HENT:      Head: Normocephalic and atraumatic  Right Ear: Tympanic membrane, ear canal and external ear normal       Left Ear: Tympanic membrane, ear canal and external ear normal       Nose: Nose normal       Mouth/Throat:      Mouth: Mucous membranes are moist       Pharynx: Oropharynx is clear  Eyes:      Extraocular Movements: Extraocular movements intact  Pupils: Pupils are equal, round, and reactive to light  Cardiovascular:      Rate and Rhythm: Normal rate and regular rhythm  Pulses: Normal pulses  Heart sounds: Normal heart sounds  Pulmonary:      Effort: Pulmonary effort is normal       Breath sounds: Normal breath sounds  Abdominal:      General: Bowel sounds are normal       Palpations: Abdomen is soft  Musculoskeletal:         General: Normal range of motion  Cervical back: Normal range of motion and neck supple  Skin:     General: Skin is warm and dry  Neurological:      General: No focal deficit present  Mental Status: He is alert and oriented to person, place, and time  Mental status is at baseline  Psychiatric:         Mood and Affect: Mood normal          Behavior: Behavior normal          Thought Content:  Thought content normal          Judgment: Judgment normal

## 2022-08-19 ENCOUNTER — APPOINTMENT (OUTPATIENT)
Dept: LAB | Facility: CLINIC | Age: 57
End: 2022-08-19
Payer: COMMERCIAL

## 2022-08-19 DIAGNOSIS — R03.0 ELEVATED BLOOD PRESSURE READING: ICD-10-CM

## 2022-08-19 DIAGNOSIS — E53.8 CYANOCOBALAMIN DEFICIENCY: ICD-10-CM

## 2022-08-19 DIAGNOSIS — R06.09 DYSPNEA ON EXERTION: ICD-10-CM

## 2022-08-19 DIAGNOSIS — E78.5 HYPERLIPIDEMIA, UNSPECIFIED HYPERLIPIDEMIA TYPE: ICD-10-CM

## 2022-08-19 DIAGNOSIS — Z12.5 SCREENING PSA (PROSTATE SPECIFIC ANTIGEN): ICD-10-CM

## 2022-08-19 DIAGNOSIS — E66.01 MORBID OBESITY WITH BMI OF 45.0-49.9, ADULT (HCC): ICD-10-CM

## 2022-08-19 DIAGNOSIS — R73.03 PREDIABETES: ICD-10-CM

## 2022-08-19 DIAGNOSIS — E55.9 VITAMIN D DEFICIENCY: ICD-10-CM

## 2022-08-19 DIAGNOSIS — D86.9 SARCOIDOSIS: ICD-10-CM

## 2022-08-19 LAB
25(OH)D3 SERPL-MCNC: 22.4 NG/ML (ref 30–100)
ALBUMIN SERPL BCP-MCNC: 4.3 G/DL (ref 3.5–5)
ALP SERPL-CCNC: 65 U/L (ref 34–104)
ALT SERPL W P-5'-P-CCNC: 23 U/L (ref 7–52)
ANION GAP SERPL CALCULATED.3IONS-SCNC: 6 MMOL/L (ref 4–13)
AST SERPL W P-5'-P-CCNC: 13 U/L (ref 13–39)
BASOPHILS # BLD AUTO: 0.05 THOUSANDS/ΜL (ref 0–0.1)
BASOPHILS NFR BLD AUTO: 1 % (ref 0–1)
BILIRUB SERPL-MCNC: 0.51 MG/DL (ref 0.2–1)
BILIRUB UR QL STRIP: NEGATIVE
BUN SERPL-MCNC: 13 MG/DL (ref 5–25)
CALCIUM SERPL-MCNC: 9.5 MG/DL (ref 8.4–10.2)
CHLORIDE SERPL-SCNC: 104 MMOL/L (ref 96–108)
CHOLEST SERPL-MCNC: 247 MG/DL
CLARITY UR: CLEAR
CO2 SERPL-SCNC: 28 MMOL/L (ref 21–32)
COLOR UR: NORMAL
CORTIS AM PEAK SERPL-MCNC: 15.9 UG/DL (ref 4.2–22.4)
CREAT SERPL-MCNC: 1.13 MG/DL (ref 0.6–1.3)
CREAT UR-MCNC: 110 MG/DL
CRP SERPL QL: 7.6 MG/L
D DIMER PPP FEU-MCNC: 0.34 UG/ML FEU
EOSINOPHIL # BLD AUTO: 0.19 THOUSAND/ΜL (ref 0–0.61)
EOSINOPHIL NFR BLD AUTO: 3 % (ref 0–6)
ERYTHROCYTE [DISTWIDTH] IN BLOOD BY AUTOMATED COUNT: 13 % (ref 11.6–15.1)
ERYTHROCYTE [SEDIMENTATION RATE] IN BLOOD: 37 MM/HOUR (ref 0–19)
EST. AVERAGE GLUCOSE BLD GHB EST-MCNC: 126 MG/DL
GFR SERPL CREATININE-BSD FRML MDRD: 71 ML/MIN/1.73SQ M
GLUCOSE P FAST SERPL-MCNC: 129 MG/DL (ref 65–99)
GLUCOSE UR STRIP-MCNC: NEGATIVE MG/DL
HBA1C MFR BLD: 6 %
HCT VFR BLD AUTO: 47.2 % (ref 36.5–49.3)
HDLC SERPL-MCNC: 39 MG/DL
HGB BLD-MCNC: 15.5 G/DL (ref 12–17)
HGB UR QL STRIP.AUTO: NEGATIVE
IMM GRANULOCYTES # BLD AUTO: 0.08 THOUSAND/UL (ref 0–0.2)
IMM GRANULOCYTES NFR BLD AUTO: 1 % (ref 0–2)
KETONES UR STRIP-MCNC: NEGATIVE MG/DL
LDLC SERPL CALC-MCNC: 152 MG/DL (ref 0–100)
LEUKOCYTE ESTERASE UR QL STRIP: NEGATIVE
LYMPHOCYTES # BLD AUTO: 1.19 THOUSANDS/ΜL (ref 0.6–4.47)
LYMPHOCYTES NFR BLD AUTO: 20 % (ref 14–44)
MCH RBC QN AUTO: 28.5 PG (ref 26.8–34.3)
MCHC RBC AUTO-ENTMCNC: 32.8 G/DL (ref 31.4–37.4)
MCV RBC AUTO: 87 FL (ref 82–98)
MICROALBUMIN UR-MCNC: 10.5 MG/L (ref 0–20)
MICROALBUMIN/CREAT 24H UR: 10 MG/G CREATININE (ref 0–30)
MONOCYTES # BLD AUTO: 0.46 THOUSAND/ΜL (ref 0.17–1.22)
MONOCYTES NFR BLD AUTO: 8 % (ref 4–12)
NEUTROPHILS # BLD AUTO: 4.13 THOUSANDS/ΜL (ref 1.85–7.62)
NEUTS SEG NFR BLD AUTO: 67 % (ref 43–75)
NITRITE UR QL STRIP: NEGATIVE
NRBC BLD AUTO-RTO: 0 /100 WBCS
NT-PROBNP SERPL-MCNC: 36 PG/ML
PH UR STRIP.AUTO: 5.5 [PH]
PLATELET # BLD AUTO: 221 THOUSANDS/UL (ref 149–390)
PMV BLD AUTO: 9.5 FL (ref 8.9–12.7)
POTASSIUM SERPL-SCNC: 4.5 MMOL/L (ref 3.5–5.3)
PROT SERPL-MCNC: 7.5 G/DL (ref 6.4–8.4)
PROT UR STRIP-MCNC: NEGATIVE MG/DL
PSA SERPL-MCNC: 3.1 NG/ML (ref 0–4)
RBC # BLD AUTO: 5.43 MILLION/UL (ref 3.88–5.62)
SODIUM SERPL-SCNC: 138 MMOL/L (ref 135–147)
SP GR UR STRIP.AUTO: 1.01 (ref 1–1.03)
TRIGL SERPL-MCNC: 281 MG/DL
TSH SERPL DL<=0.05 MIU/L-ACNC: 2.18 UIU/ML (ref 0.45–4.5)
URATE SERPL-MCNC: 6.8 MG/DL (ref 3.5–8.5)
UROBILINOGEN UR STRIP-ACNC: <2 MG/DL
VIT B12 SERPL-MCNC: 421 PG/ML (ref 100–900)
WBC # BLD AUTO: 6.1 THOUSAND/UL (ref 4.31–10.16)

## 2022-08-19 PROCEDURE — 82306 VITAMIN D 25 HYDROXY: CPT

## 2022-08-19 PROCEDURE — 84443 ASSAY THYROID STIM HORMONE: CPT

## 2022-08-19 PROCEDURE — 82570 ASSAY OF URINE CREATININE: CPT

## 2022-08-19 PROCEDURE — 86140 C-REACTIVE PROTEIN: CPT

## 2022-08-19 PROCEDURE — 82607 VITAMIN B-12: CPT

## 2022-08-19 PROCEDURE — 82043 UR ALBUMIN QUANTITATIVE: CPT

## 2022-08-19 PROCEDURE — 83880 ASSAY OF NATRIURETIC PEPTIDE: CPT

## 2022-08-19 PROCEDURE — G0103 PSA SCREENING: HCPCS

## 2022-08-19 PROCEDURE — 81003 URINALYSIS AUTO W/O SCOPE: CPT | Performed by: FAMILY MEDICINE

## 2022-08-19 PROCEDURE — 80061 LIPID PANEL: CPT

## 2022-08-19 PROCEDURE — 85025 COMPLETE CBC W/AUTO DIFF WBC: CPT

## 2022-08-19 PROCEDURE — 85652 RBC SED RATE AUTOMATED: CPT

## 2022-08-19 PROCEDURE — 85379 FIBRIN DEGRADATION QUANT: CPT

## 2022-08-19 PROCEDURE — 36415 COLL VENOUS BLD VENIPUNCTURE: CPT

## 2022-08-19 PROCEDURE — 82533 TOTAL CORTISOL: CPT

## 2022-08-19 PROCEDURE — 84550 ASSAY OF BLOOD/URIC ACID: CPT

## 2022-08-19 PROCEDURE — 83036 HEMOGLOBIN GLYCOSYLATED A1C: CPT

## 2022-08-19 PROCEDURE — 80053 COMPREHEN METABOLIC PANEL: CPT

## 2022-09-14 ENCOUNTER — HOSPITAL ENCOUNTER (OUTPATIENT)
Dept: NON INVASIVE DIAGNOSTICS | Facility: HOSPITAL | Age: 57
Discharge: HOME | End: 2022-09-14
Attending: FAMILY MEDICINE
Payer: COMMERCIAL

## 2022-09-14 VITALS
DIASTOLIC BLOOD PRESSURE: 82 MMHG | BODY MASS INDEX: 49.26 KG/M2 | HEIGHT: 63 IN | SYSTOLIC BLOOD PRESSURE: 158 MMHG | WEIGHT: 278 LBS | HEART RATE: 85 BPM

## 2022-09-14 DIAGNOSIS — R06.09 DYSPNEA ON EXERTION: ICD-10-CM

## 2022-09-14 DIAGNOSIS — I51.7 RIGHT VENTRICULAR DILATION: Primary | ICD-10-CM

## 2022-09-14 DIAGNOSIS — U07.1 COVID-19: ICD-10-CM

## 2022-09-14 LAB
AORTIC ROOT: 3 CM
APICAL FOUR CHAMBER EJECTION FRACTION: 68 %
E WAVE DECELERATION TIME: 200 MS
FRACTIONAL SHORTENING: 43 % (ref 28–44)
INTERVENTRICULAR SEPTUM IN DIASTOLE (PARASTERNAL SHORT AXIS VIEW): 1.2 CM
INTERVENTRICULAR SEPTUM: 1.2 CM (ref 0.6–1.1)
LEFT ATRIUM SIZE: 3.1 CM
LEFT INTERNAL DIMENSION IN SYSTOLE: 2.4 CM (ref 2.1–4)
LEFT VENTRICULAR INTERNAL DIMENSION IN DIASTOLE: 4.2 CM (ref 3.5–6)
LEFT VENTRICULAR POSTERIOR WALL IN END DIASTOLE: 1.1 CM
LEFT VENTRICULAR STROKE VOLUME: 59 ML
LVSV (TEICH): 59 ML
MV E'TISSUE VEL-SEP: 12 CM/S
MV PEAK A VEL: 0.66 M/S
MV PEAK E VEL: 61 CM/S
MV STENOSIS PRESSURE HALF TIME: 58 MS
MV VALVE AREA P 1/2 METHOD: 3.79 CM2
RIGHT VENTRICLE ID DIMENSION: 5.1 CM
SL CV LV EF: 65
SL CV PED ECHO LEFT VENTRICLE DIASTOLIC VOLUME (MOD BIPLANE) 2D: 78 ML
SL CV PED ECHO LEFT VENTRICLE SYSTOLIC VOLUME (MOD BIPLANE) 2D: 20 ML

## 2022-09-14 PROCEDURE — 93306 TTE W/DOPPLER COMPLETE: CPT | Performed by: INTERNAL MEDICINE

## 2022-09-14 PROCEDURE — 93306 TTE W/DOPPLER COMPLETE: CPT

## 2022-09-14 RX ADMIN — PERFLUTREN 0.6 ML/MIN: 6.52 INJECTION, SUSPENSION INTRAVENOUS at 12:05

## 2022-11-14 ENCOUNTER — TELEMEDICINE (OUTPATIENT)
Dept: FAMILY MEDICINE CLINIC | Facility: CLINIC | Age: 57
End: 2022-11-14

## 2022-11-14 DIAGNOSIS — Z20.822 EXPOSURE TO COVID-19 VIRUS: Primary | ICD-10-CM

## 2022-11-14 DIAGNOSIS — R19.7 DIARRHEA, UNSPECIFIED TYPE: ICD-10-CM

## 2022-11-14 DIAGNOSIS — R50.9 FEVER, UNSPECIFIED FEVER CAUSE: ICD-10-CM

## 2022-11-14 NOTE — PROGRESS NOTES
Virtual Regular Visit    Verification of patient location:    Patient is located in the following state in which I hold an active license [place]      Assessment/Plan:      Concern for exposure to COVID and flu since he did not get the flu vaccine  Will get checked for flu and COVID PCR testing  He still within the window to get treated for Tamiflu if positive and also same window for Paxlovid if COVID positive  Close monitor needed otherwise hydration supportive care  Problem List Items Addressed This Visit    None     Visit Diagnoses     Exposure to COVID-19 virus    -  Primary    Relevant Orders    Covid/Flu- Office Collect    Fever, unspecified fever cause        Diarrhea, unspecified type                   Reason for visit is   Chief Complaint   Patient presents with   • Nausea     Nausea, vomiting, diarrhea for 3 days   • Virtual Regular Visit        Encounter provider Sid Cruz MD    Provider located at [address] [place] [address] [place] [id number] [phone]      Recent Visits  No visits were found meeting these conditions  Showing recent visits within past 7 days and meeting all other requirements  Today's Visits  Date Type Provider Dept   11/14/22 Telemedicine Sid [place] [id number] [place] today's visits and meeting all other requirements  Future Appointments  No visits were found meeting these conditions  Showing future appointments within next 150 days and meeting all other requirements       The patient was identified by name and date of birth  Malena Reese was informed that this is a telemedicine visit and that the visit is being conducted through the [employer]  He agrees to proceed     My office door was closed  No one else was in the room  He acknowledged consent and understanding of privacy and security of the video platform   The patient has agreed to participate and understands they can discontinue the visit at any time  Patient is aware this is a billable service  Subjective  Tomasz Galindo is a 62 y o  male    63 yo M Presenting complaint of 3 days' history of fever chills nausea vomiting diarrhea  He has been taking TheraFlu hydration Gatorade Powerade with no improvement  Today his  fever subsided but still very tired  He has obstructive sleep apnea wears CPAP  He has mild depression on Lexapro  Has morbid obesity  He is interested in saxenda and since his sister also on it  He has prediabetes  He is up to date w covid vaccine  He had COVID infection in December with moderate symptom  No sick contact however his wife worked at a group home and there was exposure to [employer]         Past Medical History:   Diagnosis Date   • Anxiety    • GERD (gastroesophageal reflux disease)    • Joint pain in both hands 2/19/2021   • Kidney stone    • Kidney stones 2/19/2021   • Mass of anterior abdominal wall 2/19/2021    LLQ 9x6 cm   • MI (myocardial infarction) ([facility] [address] )    • Morbid obesity with BMI of 50 0-59 9, adult ([facility] [address] )    • Need for hepatitis B vaccination 7/15/2021   • Need for MMR vaccine 7/15/2021   • Nonimmune to hepatitis B virus 3/4/2021   • Parotid gland enlargement 2/19/2021    left   • Prediabetes 3/4/2021   • Primary osteoarthritis of left knee 7/15/2021   • RUQ pain 7/15/2021   • Sarcoidosis 2/19/2021   • Sleep apnea     cpap   • Soft tissue mass 2/19/2021    R upper back 9x 11x3cm L upper back 14x20x 4 cm       Past Surgical History:   Procedure Laterality Date   • BRONCHOSCOPY     • COLONOSCOPY     • EGD     • HAND SURGERY Bilateral 2019    trigger finger and carpal tunnel    • KNEE ARTHROSCOPY      meniscus surgery   • NC EXCISION TUMOR SOFT TISSUE BACK/FLANK SUBQ 3+CM N/A 4/8/2021    Procedure: excision large mass back subfascial  25x 13x 3;  Surgeon: Misty Gore MD;  Location:  MAIN OR;  Service: General   • VASECTOMY         Current Outpatient Medications Medication Sig Dispense Refill   • Ascorbic Acid (vitamin C) 100 MG tablet Take 100 mg by mouth daily     • Cholecalciferol (VITAMIN D PO) Take 10,000 Units by mouth every other day      • escitalopram (LEXAPRO) 10 mg tablet TAKE 1 TABLET BY MOUTH EVERY DAY 90 tablet 1   • fluticasone-salmeterol (Advair HFA) 115-21 MCG/ACT inhaler Inhale 2 puffs 2 (two) times a day Rinse mouth after use  12 g 1   • Multiple Vitamins-Minerals (multivitamin with minerals) tablet Take 1 tablet by mouth daily     • pantoprazole (PROTONIX) 40 mg tablet Take 1 tablet (40 mg total) by mouth daily before breakfast 90 tablet 3     No current facility-administered medications for this visit  Allergies   Allergen Reactions   • Azithromycin Abdominal Pain   • Erythromycin Abdominal Pain       Review of Systems   Constitutional: Positive for chills, fatigue and fever  Gastrointestinal: Positive for diarrhea, nausea and vomiting  Video Exam    There were no vitals filed for this visit  Physical Exam  Vitals and nursing note reviewed  Constitutional:       Appearance: Normal appearance  He is obese  HENT:      Head: Normocephalic and atraumatic  Pulmonary:      Effort: Pulmonary effort is normal    Neurological:      Mental Status: He is alert and oriented to person, place, and time  Psychiatric:         Mood and Affect: Mood normal          Behavior: Behavior normal          Thought Content:  Thought content normal          Judgment: Judgment normal           I spent 30 minutes directly with the patient during this visit

## 2022-11-15 LAB
FLUAV RNA RESP QL NAA+PROBE: NEGATIVE
FLUBV RNA RESP QL NAA+PROBE: NEGATIVE
SARS-COV-2 RNA RESP QL NAA+PROBE: NEGATIVE

## 2022-11-17 DIAGNOSIS — Z83.79 FAMILY HISTORY OF CROHN'S DISEASE: ICD-10-CM

## 2022-11-17 DIAGNOSIS — R19.7 DIARRHEA, UNSPECIFIED TYPE: Primary | ICD-10-CM

## 2022-11-18 ENCOUNTER — TELEPHONE (OUTPATIENT)
Dept: FAMILY MEDICINE CLINIC | Facility: CLINIC | Age: 57
End: 2022-11-18

## 2022-11-18 NOTE — TELEPHONE ENCOUNTER
Please let pt know he needs to drink lots of fluid=3 L/day  Having bouts of diarrhea can dehydrate him, risk of kidney stone   By Monday if not better=he will need to be seen to check for urine

## 2022-11-20 ENCOUNTER — APPOINTMENT (OUTPATIENT)
Dept: LAB | Facility: CLINIC | Age: 57
End: 2022-11-20

## 2022-11-20 DIAGNOSIS — R19.7 DIARRHEA, UNSPECIFIED TYPE: ICD-10-CM

## 2022-11-20 DIAGNOSIS — Z83.79 FAMILY HISTORY OF CROHN'S DISEASE: ICD-10-CM

## 2022-11-21 ENCOUNTER — APPOINTMENT (OUTPATIENT)
Dept: LAB | Facility: CLINIC | Age: 57
End: 2022-11-21

## 2022-11-21 DIAGNOSIS — A04.5 CAMPYLOBACTER DIARRHEA: Primary | ICD-10-CM

## 2022-11-21 DIAGNOSIS — Z83.79 FAMILY HISTORY OF CROHN'S DISEASE: ICD-10-CM

## 2022-11-21 LAB
C DIFF TOX GENS STL QL NAA+PROBE: NEGATIVE
CAMPYLOBACTER DNA SPEC NAA+PROBE: DETECTED
SALMONELLA DNA SPEC QL NAA+PROBE: ABNORMAL
SHIGA TOXIN STX GENE SPEC NAA+PROBE: ABNORMAL
SHIGELLA DNA SPEC QL NAA+PROBE: ABNORMAL

## 2022-11-21 RX ORDER — CIPROFLOXACIN 500 MG/1
500 TABLET, FILM COATED ORAL EVERY 12 HOURS SCHEDULED
Qty: 10 TABLET | Refills: 0 | Status: SHIPPED | OUTPATIENT
Start: 2022-11-21 | End: 2022-11-26

## 2022-11-23 LAB — WBC STL QL MICRO: NORMAL

## 2022-11-24 LAB — CALPROTECTIN STL-MCNT: 1292 UG/G (ref 0–120)

## 2023-02-21 ENCOUNTER — OFFICE VISIT (OUTPATIENT)
Dept: FAMILY MEDICINE CLINIC | Facility: CLINIC | Age: 58
End: 2023-02-21

## 2023-02-21 VITALS
WEIGHT: 281 LBS | HEART RATE: 82 BPM | TEMPERATURE: 98.2 F | BODY MASS INDEX: 49.79 KG/M2 | RESPIRATION RATE: 17 BRPM | DIASTOLIC BLOOD PRESSURE: 82 MMHG | SYSTOLIC BLOOD PRESSURE: 142 MMHG | OXYGEN SATURATION: 98 % | HEIGHT: 63 IN

## 2023-02-21 DIAGNOSIS — R50.9 LOW GRADE FEVER: ICD-10-CM

## 2023-02-21 DIAGNOSIS — R09.81 NASAL SINUS CONGESTION: ICD-10-CM

## 2023-02-21 DIAGNOSIS — J02.9 ACUTE SORE THROAT: ICD-10-CM

## 2023-02-21 DIAGNOSIS — U07.1 COVID-19: Primary | ICD-10-CM

## 2023-02-21 LAB
SARS-COV-2 AG UPPER RESP QL IA: POSITIVE
VALID CONTROL: ABNORMAL

## 2023-02-21 NOTE — PROGRESS NOTES
BMI Counseling: Body mass index is 49 78 kg/m²  The BMI is above normal  Nutrition recommendations include decreasing portion sizes, encouraging healthy choices of fruits and vegetables, decreasing fast food intake, consuming healthier snacks, limiting drinks that contain sugar, moderation in carbohydrate intake, increasing intake of lean protein, reducing intake of saturated and trans fat and reducing intake of cholesterol  Exercise recommendations include vigorous physical activity 75 minutes/week, exercising 3-5 times per week, obtaining a gym membership and strength training exercises  No pharmacotherapy was ordered  Rationale for BMI follow-up plan is due to patient being overweight or obese  Depression Screening and Follow-up Plan: Patient was screened for depression during today's encounter  They screened negative with a PHQ-2 score of 0        COVID-19 Outpatient Progress Note    Assessment/Plan:    Problem List Items Addressed This Visit        Respiratory    Nasal sinus congestion       Other    COVID-19 - Primary    Acute sore throat    Relevant Orders    POCT Rapid Covid Ag (Completed)    Low grade fever      Disposition:     I recommended COVID-19 PCR test 5 days after close contact exposure  Patient does not need to quarantine and should wear a high quality mask for 10 days  If testing on day 5 reveals you are positive, you should immediately isolate  Risks and benefits of COVID-19 vaccination was discussed with patient  Discussed COVID-19 antibody testing with the patient  If you choose to have a COVID-19 antibody test, you should understand some important limitations of this test   Antibody tests detect the body's immune response to infection rather than detecting the virus itself  It can take weeks for antibodies to show up in the blood  For this reason, antibody tests are not used to detect or manage infection  They may be better for tracking infections in the community   Current antibody tests have not undergone the usual strict FDA approval process  The FDA decided to make it easier to have more tests available  The result is that these new tests may not be reliable  Since COVID-19 antibody testing is so new, we do not know how accurate it is  You can have a positive test and have not actually been infected  You also can have a negative test even though you might have been infected  There are other coronaviruses that are known to cause the common cold  Many people may have antibodies to these other viruses that could make the COVID-19 antibody test positive  More importantly, even if you test positive, this does not necessarily mean you are immune to the virus  Even so, your [facility]'s provider understands that you may still want a COVID-19 antibody test and can order this test for you  It is important that you review the results with your provider and decide together how to use them  Discussed symptom directed medication options with patient  Discussed vitamin D, vitamin C, and/or zinc supplementation with patient  I have spent 25 minutes directly with the patient  Greater than 50% of this time was spent in counseling/coordination of care regarding: prognosis, risks and benefits of treatment options, instructions for management, patient and family education, importance of treatment compliance, risk factor reductions and impressions  Encounter provider: VIVIENNE Powell     Provider located at: [address] [place] [id number] [phone]     Recent Visits  No visits were found meeting these conditions    Showing recent visits within past 7 days and meeting all other requirements  Today's Visits  Date Type Provider Dept   02/21/23 Office Visit KEVIN Powell 112 today's visits and meeting all other requirements  Future Appointments  No visits were found meeting these conditions  Showing future appointments within next 150 days and meeting all other requirements     Subjective:   Linda Perea is a 62 y o  male who is concerned about COVID-19  Patient's symptoms include nasal congestion, sore throat and cough  Patient denies fever, chills, fatigue, malaise, rhinorrhea, anosmia, loss of taste, shortness of breath, chest tightness, abdominal pain, nausea, vomiting, diarrhea, myalgias and headaches  COVID-19 vaccination status: Fully vaccinated (primary series)    Exposure:   Contact with a person who is under investigation (PUI) for or who is positive for COVID-19 within the last 14 days?: Yes    Hospitalized recently for fever and/or lower respiratory symptoms?: No      Currently a healthcare worker that is involved in direct patient care?: No      Works in a special setting where the risk of COVID-19 transmission may be high? (this may include long-term care, correctional and long term facilities; homeless shelters; assisted-living facilities and group homes ): No      Resident in a special setting where the risk of COVID-19 transmission may be high? (this may include long-term care, correctional and long term facilities; homeless shelters; assisted-living facilities and group homes ): No      Lab Results   Component Value Date    SARSCOV2 Negative 11/14/2022    SARSCOVAG Positive (A) 02/21/2023       Review of Systems   Constitutional: Negative for chills, fatigue and fever  HENT: Positive for congestion and sore throat  Negative for rhinorrhea  Respiratory: Positive for cough  Negative for chest tightness and shortness of breath  Gastrointestinal: Negative for abdominal pain, diarrhea, nausea and vomiting  Musculoskeletal: Negative for myalgias  Neurological: Negative for headaches       Current Outpatient Medications on File Prior to Visit   Medication Sig   • Ascorbic Acid (vitamin C) 100 MG tablet Take 100 mg by mouth daily   • Cholecalciferol (VITAMIN D PO) Take 10,000 Units by mouth every other day    • escitalopram (LEXAPRO) 10 mg tablet TAKE 1 TABLET BY MOUTH EVERY DAY   • fluticasone-salmeterol (Advair HFA) 115-21 MCG/ACT inhaler Inhale 2 puffs 2 (two) times a day Rinse mouth after use  • Multiple Vitamins-Minerals (multivitamin with minerals) tablet Take 1 tablet by mouth daily   • pantoprazole (PROTONIX) 40 mg tablet Take 1 tablet (40 mg total) by mouth daily before breakfast       Objective:    /82 (BP Location: Left arm, Patient Position: Sitting, Cuff Size: Large)   Pulse 82   Temp 98 2 °F (36 8 °C) (Temporal)   Resp 17   Ht 5' 3" (1 6 m)   Wt 127 kg (281 lb)   SpO2 98%   BMI 49 78 kg/m²      Physical Exam  Vitals and nursing note reviewed  Exam conducted with a chaperone present  Constitutional:       General: He is not in acute distress  Appearance: Normal appearance  He is well-developed  He is obese  HENT:      Head: Normocephalic and atraumatic  Right Ear: Tympanic membrane, ear canal and external ear normal       Left Ear: Tympanic membrane, ear canal and external ear normal       Nose: Congestion present  No rhinorrhea  Mouth/Throat:      Mouth: Mucous membranes are moist       Pharynx: Posterior oropharyngeal erythema present  Tonsils: Tonsillar exudate present  No tonsillar abscesses  2+ on the right  2+ on the left  Eyes:      Extraocular Movements: Extraocular movements intact  Right eye: Normal extraocular motion  Left eye: Normal extraocular motion  Conjunctiva/sclera: Conjunctivae normal       Pupils: Pupils are equal, round, and reactive to light  Cardiovascular:      Rate and Rhythm: Normal rate and regular rhythm  Pulses: Normal pulses  Heart sounds: Normal heart sounds  No murmur heard  Pulmonary:      Effort: Pulmonary effort is normal  No respiratory distress  Breath sounds: Normal breath sounds     Abdominal:      General: Abdomen is flat  Bowel sounds are normal       Palpations: Abdomen is soft  Tenderness: There is no abdominal tenderness  Musculoskeletal:         General: No swelling  Normal range of motion  Cervical back: Normal range of motion and neck supple  Skin:     General: Skin is warm and dry  Capillary Refill: Capillary refill takes less than 2 seconds  Neurological:      General: No focal deficit present  Mental Status: He is alert and oriented to person, place, and time     Psychiatric:         Mood and Affect: Mood normal          Behavior: Behavior normal        VIVIENNE Edward

## 2023-02-21 NOTE — PATIENT INSTRUCTIONS
How To Wash Your Hands   WHAT YOU NEED TO KNOW:   Your hands carry germs even when they look clean  Germs can spread when you touch someone, or when you touch a surface or object with germs on it  Wash your hands often to help prevent you from getting sick or spreading germs to others  DISCHARGE INSTRUCTIONS:   How to wash your hands correctly:   Wet your hands with clean, running water  Apply soap  Rub the soap over the front and back of your hands and between your fingers  Use the fingers of one hand to scrub under the fingernails of the other hand  Rub your hands together for 20 seconds  This is about the amount of time it takes to sing the happy birthday song 2 times  Rinse your hands well under running water  Dry your hands with a clean towel or paper towel, or let them air dry  Use a paper towel to turn the faucet off  If you share a bathroom with other people, use the paper towel to open the door when you leave  Hand  gels and wipes can be used to clean your hands if soap and water are not available  The alcohol in the  helps kill germs on your hands  Rub the gel all over your hands until it dries  When to wash your hands:   Before you prepare, cook, and eat food    After you cough, sneeze, or blow your nose    After you go to the bathroom, help a child go to the bathroom, or change a diaper    After you touch an animal or clean up animal waste    Before and after you clean or care for a cut or other wound    Before and after you touch someone who is sick    After you touch garbage    Other tips to know:   Do not touch your face without washing your hands first   You can transfer a virus or other germs from your hands to your face  This can quickly lead to an infection  Cover a cough or sneeze  Germs can travel in droplets that come from your airway when you sneeze or cough  Droplets can travel 3 to 6 feet (1 to 2 meters)   Anyone who breathes in the droplets or gets them in his or her eyes can become infected with the germs  Use a tissue that covers your mouth or nose  Then throw the tissue away and wash your hands or use hand   Cough or sneeze into the bend of your arm if you do not have a tissue  Teach children how to cover a cough or sneeze properly  Remind them to wash their hands after they cough or sneeze  Teach children how to wash their hands correctly  Put hand soap within easy reach of the sink  Have children use a timer or teach them a song to sing so they know how long to wash their hands  Clean surfaces often  Use a disinfecting wipe, a single-use sponge, or a cloth you can wash and reuse  Use disinfecting  if you do not have wipes  You can create a disinfecting  by mixing 1 part bleach with 10 parts water  Clean kitchen countertops, cooking surfaces, and the fronts and insides of the microwave and refrigerator  Clean the bath tub, toilet, the area around the toilet, the sink, the area around the sink, and all faucets  Also clean computer keyboards, phones, and doorknobs often  © Copyright [employer] 2022 Information is for End User's use only and may not be sold, redistributed or otherwise used for commercial purposes  The above information is an  only  It is not intended as medical advice for individual conditions or treatments  Talk to your doctor, nurse or pharmacist before following any medical regimen to see if it is safe and effective for you  COVID-19 and Chronic Health Conditions   WHAT YOU NEED TO KNOW:   Your chronic condition may increase your risk for COVID-19 or serious problems it can cause  Healthcare providers might need to make changes that affect how you usually manage your chronic health condition  Providers may change hours of operation or not have patients come in to be seen  You may not be able to make appointments to get blood drawn or to have tests or procedures   This may continue until the virus that causes COVID-19 is controlled  Until then, you can take steps to manage your condition  The steps will also lower your risk for COVID-19 or the serious problems it causes  If you do develop COVID-19, healthcare providers will tell you when it is okay to be around others after you recover  This depends on your chronic condition, any symptoms of COVID-19 that developed, and how severe the symptoms were  DISCHARGE INSTRUCTIONS:   Call your local emergency number [phone] in the [address] [place] [address],[address]) or an emergency department if:   You have trouble breathing or shortness of breath  You have chest pain or pressure that lasts longer than 5 minutes  You become confused or hard to wake  Your lips or face are blue  Return to the emergency department if:   You have a fever of 104°F (40°C) or higher  Call your doctor or healthcare provider if:   You have symptoms of COVID-19  You have questions or concerns about COVID-19 or your chronic condition  What you need to know about serious problems from the virus:  You may develop long-term health problems caused by the virus  Your risk is higher if you are 65 or older  A weak immune system, obesity, diabetes, chronic kidney disease, or a heart or lung condition can also increase your risk  Your risk is also higher if you are a current or former cigarette smoker   COVID-19 can lead to any of the following:  Multisymptom inflammatory syndrome in adults (MIS-A) or in children (MIS-C), causing inflammation in the heart, digestive system, skin, or brain    Shortness of breath, serious lower respiratory conditions, such as pneumonia or acute respiratory distress syndrome (ARDS)    Blood clots or blood vessel damage    Organ damage from a lack of oxygen or from blood clots    Sleep problems    Problems thinking clearly, remembering information, or concentrating    Mood changes, depression, or anxiety    Long-term problems tasting or smelling    Loss of appetite and weight loss    Nerve pain    Fatigue (feeling mentally and physically tired)    How the 2019 coronavirus spreads: The virus spreads quickly and easily  The virus can be passed starting 2 to 3 days before symptoms begin or before a positive test if symptoms never begin  Droplets are the main way all coronaviruses spread  The virus travels in droplets that form when a person talks, sings, coughs, or sneezes  The droplets can also float in the air for minutes or hours  Infection happens when you breathe in the droplets or get them in your eyes or nose  Close personal contact with an infected person increases your risk for infection  This means being within 6 feet (2 meters) of the person for at least 15 minutes over 24 hours  Person-to-person contact can spread the virus  For example, a person with the virus on his or her hands can spread it by shaking hands with someone  The virus can stay on objects and surfaces for up to 3 days  You may become infected by touching the object or surface and then touching your eyes or mouth  What you need to know about the signs and symptoms of COVID-19:  Signs and symptoms usually start about 5 days after infection but can take 2 to 14 days  Signs and symptoms range from mild to severe  You may feel like you have the flu or a bad cold  Your chronic health condition may cause some of the same symptoms COVID-19 causes  This can make it hard to know if a symptom is from COVID-19 or your chronic condition  Keep a record of any new or worsening symptom you have  This is especially important if you have a condition that often causes shortness of breath  Your provider can tell you if you should be tested for COVID-19   Tell your healthcare provider if you think you were infected but develop signs or symptoms not listed below:  A cough    Shortness of breath or trouble breathing that may become severe    A fever of at least 100 4°F, or 38°C (may be lower in adults 65 or older)    Chills that might include shaking    Muscle pain, body aches, or a headache    A sore throat    Suddenly not being able to taste or smell anything    Feeling very tired (fatigue)    Congestion (stuffy head and nose), or a runny nose    Diarrhea, nausea, or vomiting    Manage your chronic health condition during this time:  If you do not have a regular healthcare provider, experts recommend you contact a local [facility] health center or health department  The following can help you manage your condition and prevent COVID-19:  Get emergency care for your condition if needed  Talk to your healthcare providers about symptoms of your chronic condition that need immediate care  Your providers can help you create a plan or add exacerbation management to your plan  The plan will include when to go to an emergency department and when to call your local emergency number  This will depend on where you live and the services that are available during this time  Go to dialysis appointments as scheduled  It is important to stay on schedule  You will need to have enough food to be able to follow the emergency diet plan if you must miss a session  The emergency diet needs to be part of the management plan for your condition  Reschedule any upcoming appointments as needed  Medical facilities may be closed until the coronavirus is better controlled  This means you may need to reschedule a surgery, procedure, or check-up appointment  If you cannot have a phone or video appointment, you will need to make a new appointment  Some providers may be scheduling appointments several months in advance  Some surgeries and procedures will happen as scheduled  This depends on the medical condition and the reason for the surgery or procedure  You may need to have extra testing for COVID-19 several days before  Follow any regular management plan you use    Your healthcare provider will tell you if you need to make any changes to your regular management plan  For example, if you have asthma, continue to follow your asthma action plan  If you have diabetes, you may need to check your blood sugar level more often  Stress and illness can make blood sugar levels go up  You may need to adjust medicine such as insulin  If you have a heart condition or high blood pressure, you may need to check your blood pressure more often  Stress and illness can also raise your blood pressure  Talk to your healthcare providers about your medicines  You may be able to get more than 1 month of medicine at a time  This will lower the number of times you need to go to a pharmacy to get your medicines  Make sure you have enough medicine if you have a condition that can lead to an emergency  Examples include asthma medicines, insulin, or an epinephrine pen  Check the expiration dates on the medicines you currently have  Ask for refills as soon as possible, if needed  If it is not time to refill prescriptions, you may be able to get an emergency supply of some medicines  Medicine plans vary, so ask your healthcare provider or pharmacist for options  Have supplies available in your home  If possible, get extra supplies you use regularly  Examples include absorbent pads, syringes, and wound cleaning solutions  This will limit the number of trips out of your home to get supplies  What you can do to prevent having to go out of your home during this time:   Ask your healthcare provider for other ways to have appointments  Some providers offer phone, video, or other types of appointments  Have food, medicines, and other supplies delivered  Some pharmacies can send certain medicines to you through the mail  Grocery stores and restaurants may be able to deliver food and other items  If possible, have delivered items placed somewhere  Try not to have someone hand you an item  You will be so close to the person that the virus can spread between you      Ask someone to get items you need  The person can get groceries, medicines, or other needed items for you  Choose a person who does not have signs or symptoms of COVID-19 or has tested negative for it  The person should not be waiting for test results  He or she should not have a condition that increases the risk for COVID-19 or serious problems it causes  What you need to know about COVID-19 vaccines: Your healthcare provider can give you more information about what to expect, depending on your chronic condition  You are considered fully vaccinated against COVID-19 two weeks after the final dose of any COVID-19 vaccine  Let your healthcare provider know when you have received the final dose of the vaccine  Make a copy of your vaccination card  Keep the original with you in case you need to show it  Keep the copy in a safe place  Get a COVID-19 vaccine as directed  Vaccination is recommended for everyone 6 months or older  COVID-19 vaccines are given as a shot in 1 to 3 doses as a primary series  This depends on the vaccine brand and the age of the person who receives it  A booster dose is recommended for everyone 5 years or older after the primary series is complete  A second booster  is recommended for all adults 48 or older and for immunocompromised adolescents  The second booster is also recommended for anyone who got the 1-dose brand of vaccine for the first dose and a booster  Your provider can give you more information on boosters and help you schedule all needed doses  Continue social distancing and other measures  You can become infected even after you get the vaccine  You may also be able to pass the virus to others without knowing you are infected  After you get the vaccine, check local, national, and international travel rules  You may need to be tested before you travel  Some countries require proof of a negative test before you travel   You may also need to quarantine after you return  Medicine may be given to prevent infection  The medicine can be given if you are at high risk for infection and cannot get the vaccine  It can also be given if your immune system does not respond well to the vaccine  Lower your risk for COVID-19:   Wash your hands often throughout the day  Use soap and water  Rub your soapy hands together, lacing your fingers, for at least 20 seconds  Rinse with warm, running water  Dry your hands with a clean towel or paper towel  Use hand  that contains alcohol if soap and water are not available  Teach children how to wash their hands and use hand   Cover sneezes and coughs  Turn your face away and cover your mouth and nose with a tissue  Throw the tissue away  Use the bend of your arm if a tissue is not available  Then wash your hands with soap and water or use hand   Teach children how to cover a cough or sneeze  Follow worldwide, national, and local social distancing guidelines  Keep at least 6 feet (2 meters) between you and others  Wear a face covering (mask) around anyone who does not live in your home  Use a cloth covering with at least 2 layers  You can also create layers by putting a cloth covering over a disposable non-medical mask  Cover your mouth and your nose  Try not to touch your face  If you get the virus on your hands, you can transfer it to your eyes, nose, or mouth and become infected  You can also transfer it to objects, surfaces, or people  Clean and disinfect high-touch surfaces and objects in your home often  Use disinfecting wipes, or make a solution by mixing 4 teaspoons of bleach with 1 quart (4 cups) of water  Do not  use any cleaning or disinfecting products that can trigger an asthma attack or other breathing problems  Open windows or have circulating air as you clean  Do not  mix ammonia with bleach  This will create toxic fumes         How to follow social distancing guidelines:  National and local social distancing rules vary  Rules and restrictions may change over time as restrictions are lifted  The following are general guidelines:  Stay home if you are sick or think you may have COVID-19  It is important to stay home if you are waiting for a testing appointment or for test results  Avoid close physical contact with anyone who does not live in your home  Do not shake hands with, hug, or kiss a person as a greeting  If you must use public transportation (such as a bus or subway), try to sit or stand away from others  Wear your face covering  Avoid in-person gatherings and crowds  Attend virtually if possible  Help strengthen your immune system:   Ask about other vaccines you may need  Get the influenza (flu) vaccine as soon as recommended each year, usually starting in September or October  Get the pneumonia vaccine if recommended  Your healthcare provider can tell you if you should also get other vaccines, and when to get them  Do not smoke  Nicotine and other chemicals in cigarettes and cigars can increase your risk for infection and for serious COVID-19 effects  Ask your healthcare provider for information if you currently smoke and need help to quit  E-cigarettes or smokeless tobacco still contain nicotine  Talk to your healthcare provider before you use these products  Eat a variety of healthy foods  Examples include vegetables, fruits, whole-grain breads and cereals, lean meats and poultry, fish, low-fat dairy products, and cooked beans  Healthy foods contain nutrients that help keep your immune system strong  Find ways to manage stress  You may be feeling more stressed than usual because of the COVID-19 outbreak  The situation is very stressful to many people  Talk to your healthcare providers about ways to manage stress during this time  Stress can lead to breathing problems or trigger an attack or exacerbation of many health conditions  Pick 1 or 2 times a day to watch the news  Constant news watching can increase your stress levels  Instead, do things you enjoy  Talk to a friend or go for a walk together  Read a book or magazine  Take a warm bath or listen to soothing music  Follow up with your doctor or healthcare provider as directed: Your providers will tell you when you can come in for tests, procedures, or check-ups  Bring your symptom record with you to all appointments  Write down your questions so you remember to ask them during your visits  For more information:   [facility]  [address] , [address]  Phone: [phone] 078 - [phone]  Phone: 9- 359 - [phone]  Web Address: [employer] com br    © Copyright [facility] 2022 Information is for End User's use only and may not be sold, redistributed or otherwise used for commercial purposes  The above information is an  only  It is not intended as medical advice for individual conditions or treatments  Talk to your doctor, nurse or pharmacist before following any medical regimen to see if it is safe and effective for you

## 2023-04-22 PROBLEM — R50.9 LOW GRADE FEVER: Status: RESOLVED | Noted: 2023-02-21 | Resolved: 2023-04-22

## 2023-05-24 ENCOUNTER — TELEPHONE (OUTPATIENT)
Dept: FAMILY MEDICINE CLINIC | Facility: CLINIC | Age: 58
End: 2023-05-24

## 2023-05-24 DIAGNOSIS — F41.9 ANXIETY: ICD-10-CM

## 2023-05-24 RX ORDER — ESCITALOPRAM OXALATE 10 MG/1
10 TABLET ORAL DAILY
Qty: 90 TABLET | Refills: 3 | Status: SHIPPED | OUTPATIENT
Start: 2023-05-24

## 2023-05-24 NOTE — TELEPHONE ENCOUNTER
Received fax from [facility] on [facility]  Requesting a refill on Escitalopram 10 mg  Last prescribed 06/15/2022

## 2023-08-15 ENCOUNTER — RA CDI HCC (OUTPATIENT)
Dept: OTHER | Facility: HOSPITAL | Age: 58
End: 2023-08-15

## 2023-08-15 NOTE — PROGRESS NOTES
[address] [facility] coding opportunities       Chart reviewed, no opportunity found: CHART REVIEWED, NO OPPORTUNITY FOUND        Patients Insurance        Commercial Insurance: [address]

## 2023-08-22 ENCOUNTER — OFFICE VISIT (OUTPATIENT)
Dept: FAMILY MEDICINE CLINIC | Facility: CLINIC | Age: 58
End: 2023-08-22
Payer: COMMERCIAL

## 2023-08-22 ENCOUNTER — APPOINTMENT (OUTPATIENT)
Dept: LAB | Facility: CLINIC | Age: 58
End: 2023-08-22
Payer: COMMERCIAL

## 2023-08-22 VITALS
HEART RATE: 79 BPM | WEIGHT: 288 LBS | TEMPERATURE: 97.3 F | RESPIRATION RATE: 17 BRPM | DIASTOLIC BLOOD PRESSURE: 76 MMHG | HEIGHT: 63 IN | BODY MASS INDEX: 51.03 KG/M2 | SYSTOLIC BLOOD PRESSURE: 128 MMHG | OXYGEN SATURATION: 95 %

## 2023-08-22 DIAGNOSIS — Z77.120 MOLD EXPOSURE: ICD-10-CM

## 2023-08-22 DIAGNOSIS — R05.3 CHRONIC COUGH: Primary | ICD-10-CM

## 2023-08-22 DIAGNOSIS — R73.03 PREDIABETES: ICD-10-CM

## 2023-08-22 DIAGNOSIS — E78.5 HYPERLIPIDEMIA, UNSPECIFIED HYPERLIPIDEMIA TYPE: ICD-10-CM

## 2023-08-22 PROBLEM — Z23 NEED FOR MMR VACCINE: Status: RESOLVED | Noted: 2021-07-15 | Resolved: 2023-08-22

## 2023-08-22 PROBLEM — R79.82 ELEVATED C-REACTIVE PROTEIN (CRP): Status: RESOLVED | Noted: 2021-03-04 | Resolved: 2023-08-22

## 2023-08-22 PROBLEM — E53.8 CYANOCOBALAMIN DEFICIENCY: Status: RESOLVED | Noted: 2021-03-04 | Resolved: 2023-08-22

## 2023-08-22 PROBLEM — Z01.818 PREOPERATIVE EVALUATION OF A MEDICAL CONDITION TO RULE OUT SURGICAL CONTRAINDICATIONS (TAR REQUIRED): Status: RESOLVED | Noted: 2021-03-04 | Resolved: 2023-08-22

## 2023-08-22 PROBLEM — J02.9 ACUTE SORE THROAT: Status: RESOLVED | Noted: 2023-02-21 | Resolved: 2023-08-22

## 2023-08-22 PROBLEM — Z28.39 IMMUNIZATION DEFICIENCY: Status: RESOLVED | Noted: 2021-03-04 | Resolved: 2023-08-22

## 2023-08-22 PROBLEM — R22.2 MASS OF ANTERIOR ABDOMINAL WALL: Status: RESOLVED | Noted: 2021-02-19 | Resolved: 2023-08-22

## 2023-08-22 PROBLEM — K65.4 FAT NECROSIS OF ABDOMINAL WALL (HCC): Status: RESOLVED | Noted: 2021-03-04 | Resolved: 2023-08-22

## 2023-08-22 PROBLEM — M65.311 TRIGGER THUMB OF RIGHT HAND: Status: RESOLVED | Noted: 2021-05-13 | Resolved: 2023-08-22

## 2023-08-22 PROBLEM — Z78.9 NONIMMUNE TO HEPATITIS B VIRUS: Status: RESOLVED | Noted: 2021-03-04 | Resolved: 2023-08-22

## 2023-08-22 PROBLEM — E79.0 ELEVATED BLOOD URIC ACID LEVEL: Status: RESOLVED | Noted: 2021-03-04 | Resolved: 2023-08-22

## 2023-08-22 PROBLEM — U07.1 COVID-19: Status: RESOLVED | Noted: 2022-01-10 | Resolved: 2023-08-22

## 2023-08-22 PROBLEM — Z23 NEED FOR HEPATITIS B VACCINATION: Status: RESOLVED | Noted: 2021-07-15 | Resolved: 2023-08-22

## 2023-08-22 PROBLEM — A04.5 CAMPYLOBACTER DIARRHEA: Status: RESOLVED | Noted: 2022-11-21 | Resolved: 2023-08-22

## 2023-08-22 PROBLEM — R19.7 DIARRHEA: Status: RESOLVED | Noted: 2022-11-17 | Resolved: 2023-08-22

## 2023-08-22 PROBLEM — R09.81 NASAL SINUS CONGESTION: Status: RESOLVED | Noted: 2023-02-21 | Resolved: 2023-08-22

## 2023-08-22 PROBLEM — R70.0 ESR RAISED: Status: RESOLVED | Noted: 2021-03-04 | Resolved: 2023-08-22

## 2023-08-22 PROBLEM — R23.3 BLEEDING SKIN MOLE: Status: RESOLVED | Noted: 2021-11-15 | Resolved: 2023-08-22

## 2023-08-22 PROBLEM — E55.9 VITAMIN D DEFICIENCY: Status: RESOLVED | Noted: 2020-04-29 | Resolved: 2023-08-22

## 2023-08-22 PROBLEM — D22.9 BLEEDING SKIN MOLE: Status: RESOLVED | Noted: 2021-11-15 | Resolved: 2023-08-22

## 2023-08-22 PROBLEM — R10.11 RUQ PAIN: Status: RESOLVED | Noted: 2021-07-15 | Resolved: 2023-08-22

## 2023-08-22 PROBLEM — R73.01 ELEVATED FASTING GLUCOSE: Status: RESOLVED | Noted: 2020-08-04 | Resolved: 2023-08-22

## 2023-08-22 PROBLEM — M65.322 TRIGGER FINGER, LEFT INDEX FINGER: Status: RESOLVED | Noted: 2021-05-13 | Resolved: 2023-08-22

## 2023-08-22 LAB
ALBUMIN SERPL BCP-MCNC: 4.5 G/DL (ref 3.5–5)
ALP SERPL-CCNC: 77 U/L (ref 34–104)
ALT SERPL W P-5'-P-CCNC: 28 U/L (ref 7–52)
ANION GAP SERPL CALCULATED.3IONS-SCNC: 8 MMOL/L
AST SERPL W P-5'-P-CCNC: 17 U/L (ref 13–39)
BILIRUB SERPL-MCNC: 0.57 MG/DL (ref 0.2–1)
BUN SERPL-MCNC: 14 MG/DL (ref 5–25)
CALCIUM SERPL-MCNC: 9.6 MG/DL (ref 8.4–10.2)
CHLORIDE SERPL-SCNC: 102 MMOL/L (ref 96–108)
CHOLEST SERPL-MCNC: 212 MG/DL
CO2 SERPL-SCNC: 27 MMOL/L (ref 21–32)
CREAT SERPL-MCNC: 1.05 MG/DL (ref 0.6–1.3)
EST. AVERAGE GLUCOSE BLD GHB EST-MCNC: 166 MG/DL
GFR SERPL CREATININE-BSD FRML MDRD: 77 ML/MIN/1.73SQ M
GLUCOSE P FAST SERPL-MCNC: 159 MG/DL (ref 65–99)
HBA1C MFR BLD: 7.4 %
HDLC SERPL-MCNC: 43 MG/DL
LDLC SERPL CALC-MCNC: 129 MG/DL (ref 0–100)
NONHDLC SERPL-MCNC: 169 MG/DL
POTASSIUM SERPL-SCNC: 4.4 MMOL/L (ref 3.5–5.3)
PROT SERPL-MCNC: 7.5 G/DL (ref 6.4–8.4)
SODIUM SERPL-SCNC: 137 MMOL/L (ref 135–147)
TRIGL SERPL-MCNC: 202 MG/DL

## 2023-08-22 PROCEDURE — 36415 COLL VENOUS BLD VENIPUNCTURE: CPT

## 2023-08-22 PROCEDURE — 83036 HEMOGLOBIN GLYCOSYLATED A1C: CPT

## 2023-08-22 PROCEDURE — 99214 OFFICE O/P EST MOD 30 MIN: CPT | Performed by: NURSE PRACTITIONER

## 2023-08-22 PROCEDURE — 80053 COMPREHEN METABOLIC PANEL: CPT

## 2023-08-22 PROCEDURE — 80061 LIPID PANEL: CPT

## 2023-08-22 RX ORDER — MONTELUKAST SODIUM 10 MG/1
10 TABLET ORAL
Qty: 30 TABLET | Refills: 5 | Status: SHIPPED | OUTPATIENT
Start: 2023-08-22

## 2023-08-22 RX ORDER — ATORVASTATIN CALCIUM 10 MG/1
10 TABLET, FILM COATED ORAL DAILY
Qty: 90 TABLET | Refills: 1 | Status: SHIPPED | OUTPATIENT
Start: 2023-08-22

## 2023-08-22 NOTE — PROGRESS NOTES
Assessment/Plan:    1. Chronic cough  -     montelukast (SINGULAIR) 10 mg tablet; Take 1 tablet (10 mg total) by mouth daily at bedtime    2. Hyperlipidemia, unspecified hyperlipidemia type  -     atorvastatin (LIPITOR) 10 mg tablet; Take 1 tablet (10 mg total) by mouth daily    3. Mold exposure  -     montelukast (SINGULAIR) 10 mg tablet; Take 1 tablet (10 mg total) by mouth daily at bedtime    4. Prediabetes    5. BMI 50.0-59.9, adult [facility])        The case discussed with patient using patient centered shared decision making. The patient was counseled regarding instructions for management,-- risk factor reductions,-- prognosis,-- impressions,-- risks and benefits of treatment options,-- importance of compliance with treatment. I have reviewed the instructions with the patient, answering all questions to his satisfaction. Patient clinically stable at this time. Cont with current plan of care. Check labs RE: statin therapy new start (3 months), weight loss efforts  Consider metformin is creat stable, A1c remains >6  Trial of singulair in setting of dry cough, chronic mold problem in home  RTO 8 weeks for pe, lab review, weight check      Depression Screening and Follow-up Plan: Patient was screened for depression during today's encounter. They screened negative with a PHQ-2 score of 0. Return in about 2 months (around 10/22/2023) for Annual physical.    I have spent a total time of 35 minutes on 08/22/23 in caring for this patient including Diagnostic results, Prognosis, Risks and benefits of tx options, Instructions for management, Patient and family education, Importance of tx compliance, Risk factor reductions, Impressions, Counseling / Coordination of care, Documenting in the medical record, Reviewing / ordering tests, medicine, procedures   and Obtaining or reviewing history  . Subjective:      Patient ID: Nito Morrow is a 62 y.o. male.     Chief Complaint   Patient presents with   • Follow-up     4 month       61 yo male presents for follow up  Motivated to improve health, decrease pain of knee OA  Is due for labs since starting statin  Has made some diet changes    Having intermittent dry cough. No sputum. Has mold issue in home    Feeling well overall    Reviewed medical history in detail. Changes to medical record changed where appropriate. Reviewed medications. Patient taking as prescribed. Tolerating well. Denies Side effects. Reviewed recent visits with specialists co-managing chronic conditions. The following portions of the patient's history were reviewed and updated as appropriate: allergies, current medications, past family history, past medical history, past social history, past surgical history and problem list.    Review of Systems   Constitutional: Negative for fatigue, fever and unexpected weight change. HENT: Negative for trouble swallowing. Respiratory: Positive for cough. Negative for shortness of breath. Visible mold in home. Having mold remediation performed   Cardiovascular: Negative for chest pain, palpitations and leg swelling. Gastrointestinal: Negative for abdominal pain and blood in stool. Endocrine: Negative. Genitourinary: Negative for decreased urine volume, difficulty urinating and hematuria. Musculoskeletal: Positive for arthralgias and back pain. Negative for gait problem. Skin: Negative for pallor. Neurological: Negative for dizziness, syncope and weakness. Hematological: Does not bruise/bleed easily. Psychiatric/Behavioral: Negative for confusion.         Ongoing grief r/t death of mother, other family         Current Outpatient Medications   Medication Sig Dispense Refill   • Ascorbic Acid (vitamin C) 100 MG tablet Take 100 mg by mouth daily     • atorvastatin (LIPITOR) 10 mg tablet Take 1 tablet (10 mg total) by mouth daily 90 tablet 1   • Cholecalciferol (VITAMIN D PO) Take 10,000 Units by mouth every other day      • escitalopram (LEXAPRO) 10 mg tablet Take 1 tablet (10 mg total) by mouth daily 90 tablet 3   • montelukast (SINGULAIR) 10 mg tablet Take 1 tablet (10 mg total) by mouth daily at bedtime 30 tablet 5   • Multiple Vitamins-Minerals (multivitamin with minerals) tablet Take 1 tablet by mouth daily     • pantoprazole (PROTONIX) 40 mg tablet Take 1 tablet (40 mg total) by mouth daily before breakfast 90 tablet 3   • fluticasone-salmeterol (Advair HFA) 115-21 MCG/ACT inhaler Inhale 2 puffs 2 (two) times a day Rinse mouth after use. (Patient not taking: Reported on 4/17/2023) 12 g 1   • meloxicam (MOBIC) 7.5 mg tablet Take 1 tablet (7.5 mg total) by mouth daily as needed for moderate pain (Patient not taking: Reported on 8/22/2023) 30 tablet 3     No current facility-administered medications for this visit. Patient Active Problem List   Diagnosis   • BMI 50.0-59.9, adult (HCC)   • GERD (gastroesophageal reflux disease)   • Hyperlipidemia   • Osteoarthritis of right knee   • Sleep apnea   • Anxiety   • Kidney stones   • Sarcoidosis   • Soft tissue mass   • Parotid gland enlargement   • Joint pain in both hands   • Prediabetes   • Left carpal tunnel syndrome   • Nodule of parotid gland   • Primary osteoarthritis of left knee   • Abnormal chest CT   • Family history of Crohn's disease       Objective:    /76 (BP Location: Right arm, Patient Position: Sitting, Cuff Size: Large)   Pulse 79   Temp (!) 97.3 °F (36.3 °C) (Temporal)   Resp 17   Ht 5' 3" (1.6 m)   Wt 131 kg (288 lb)   SpO2 95%   BMI 51.02 kg/m²        Physical Exam  Vitals and nursing note reviewed. Constitutional:       General: He is not in acute distress. Appearance: Normal appearance. He is obese. HENT:      Head: Normocephalic and atraumatic. Neck:      Vascular: No carotid bruit. Cardiovascular:      Rate and Rhythm: Normal rate and regular rhythm. Pulses: Normal pulses. Heart sounds: Normal heart sounds.    Pulmonary: Effort: Pulmonary effort is normal.      Breath sounds: Normal breath sounds. No wheezing or rales. Abdominal:      General: Bowel sounds are normal.      Palpations: Abdomen is soft. Tenderness: There is no abdominal tenderness. Musculoskeletal:      Right lower leg: No edema. Left lower leg: No edema. Lymphadenopathy:      Cervical: No cervical adenopathy. Skin:     General: Skin is warm and dry. Coloration: Skin is not pale. Neurological:      General: No focal deficit present. Mental Status: He is alert. Mental status is at baseline. Motor: No weakness.       Gait: Gait normal.   Psychiatric:         Mood and Affect: Mood normal.                VIVIENNE Ballard

## 2023-08-24 DIAGNOSIS — R73.03 PREDIABETES: Primary | ICD-10-CM

## 2023-08-24 RX ORDER — METFORMIN HYDROCHLORIDE 500 MG/1
500 TABLET, EXTENDED RELEASE ORAL
Qty: 90 TABLET | Refills: 1 | Status: SHIPPED | OUTPATIENT
Start: 2023-08-24

## 2023-09-22 ENCOUNTER — NURSE TRIAGE (OUTPATIENT)
Age: 58
End: 2023-09-22

## 2023-09-22 ENCOUNTER — TELEPHONE (OUTPATIENT)
Age: 58
End: 2023-09-22

## 2023-09-22 NOTE — TELEPHONE ENCOUNTER
Patient called to report possible side effects of Singular for the past two days. Symptoms of severe stomach pain and diarrhea. Please follow up with patient for prompt response from PCP. Reason for Disposition  • Caller has URGENT medicine question about med that PCP or specialist prescribed and triager unable to answer question    Answer Assessment - Initial Assessment Questions  1. NAME of MEDICATION: "What medicine are you calling about?"      Montelukast (Singulair) 10 mg   2. QUESTION: "What is your question?" (e.g., medication refill, side effect)      Possible side effect? Symptoms started two nights ago,  3. PRESCRIBING HCP: "Who prescribed it?" Reason: if prescribed by specialist, call should be referred to that group. Alfonzo PALAFOX  4. SYMPTOMS: "Do you have any symptoms?"     Severe stomach pain which awakes patient from sleep; diarrhea for two days  5.  SEVERITY: If symptoms are present, ask "Are they mild, moderate or severe?"      Has taken medication past three nights at bedtime    Protocols used: MEDICATION QUESTION CALL-ADULT-[place]

## 2023-09-22 NOTE — TELEPHONE ENCOUNTER
Patient called in with possible side effects of Montelukast (Singulair) since start of medication. Please review triage encounter for follow up from PCP for care advice.

## 2023-10-04 ENCOUNTER — TELEPHONE (OUTPATIENT)
Dept: FAMILY MEDICINE CLINIC | Facility: CLINIC | Age: 58
End: 2023-10-04

## 2023-10-04 DIAGNOSIS — R73.03 PREDIABETES: Primary | ICD-10-CM

## 2023-10-05 NOTE — TELEPHONE ENCOUNTER
Metformin low dose caused intractable diarrhea    I ordered jardiance. Hopefully insurance will cover.

## 2023-12-12 ENCOUNTER — RA CDI HCC (OUTPATIENT)
Dept: OTHER | Facility: HOSPITAL | Age: 58
End: 2023-12-12

## 2023-12-12 NOTE — PROGRESS NOTES
[address] [facility] coding opportunities          Chart Reviewed number of suggestions sent to Provider: 2     Patients Insurance        Commercial Insurance: [employer]     E66.01  E11.9

## 2023-12-20 ENCOUNTER — TELEPHONE (OUTPATIENT)
Dept: FAMILY MEDICINE CLINIC | Facility: CLINIC | Age: 58
End: 2023-12-20

## 2024-01-02 ENCOUNTER — OFFICE VISIT (OUTPATIENT)
Dept: FAMILY MEDICINE CLINIC | Facility: CLINIC | Age: 59
End: 2024-01-02
Payer: COMMERCIAL

## 2024-01-02 VITALS
BODY MASS INDEX: 50.39 KG/M2 | DIASTOLIC BLOOD PRESSURE: 76 MMHG | WEIGHT: 284.4 LBS | SYSTOLIC BLOOD PRESSURE: 136 MMHG | HEART RATE: 99 BPM | TEMPERATURE: 100.2 F | HEIGHT: 63 IN | OXYGEN SATURATION: 96 %

## 2024-01-02 DIAGNOSIS — E78.5 HYPERLIPIDEMIA, UNSPECIFIED HYPERLIPIDEMIA TYPE: ICD-10-CM

## 2024-01-02 DIAGNOSIS — R73.9 ELEVATED BLOOD SUGAR: ICD-10-CM

## 2024-01-02 DIAGNOSIS — R50.9 LOW GRADE FEVER: ICD-10-CM

## 2024-01-02 DIAGNOSIS — E55.9 VITAMIN D INSUFFICIENCY: ICD-10-CM

## 2024-01-02 DIAGNOSIS — R63.8 INCREASED BMI: ICD-10-CM

## 2024-01-02 DIAGNOSIS — Z79.899 MEDICATION MANAGEMENT: ICD-10-CM

## 2024-01-02 DIAGNOSIS — R10.11 RIGHT UPPER QUADRANT ABDOMINAL PAIN: Primary | ICD-10-CM

## 2024-01-02 DIAGNOSIS — E11.9 TYPE 2 DIABETES MELLITUS WITHOUT COMPLICATION, WITHOUT LONG-TERM CURRENT USE OF INSULIN (HCC): ICD-10-CM

## 2024-01-02 PROCEDURE — 99215 OFFICE O/P EST HI 40 MIN: CPT | Performed by: FAMILY MEDICINE

## 2024-01-03 ENCOUNTER — HOSPITAL ENCOUNTER (OUTPATIENT)
Dept: CT IMAGING | Facility: HOSPITAL | Age: 59
Discharge: HOME | End: 2024-01-03
Payer: COMMERCIAL

## 2024-01-03 ENCOUNTER — APPOINTMENT (OUTPATIENT)
Dept: LAB | Facility: CLINIC | Age: 59
End: 2024-01-03
Payer: COMMERCIAL

## 2024-01-03 DIAGNOSIS — E55.9 VITAMIN D INSUFFICIENCY: ICD-10-CM

## 2024-01-03 DIAGNOSIS — R10.11 RIGHT UPPER QUADRANT ABDOMINAL PAIN: ICD-10-CM

## 2024-01-03 DIAGNOSIS — R50.9 LOW GRADE FEVER: ICD-10-CM

## 2024-01-03 DIAGNOSIS — R73.9 ELEVATED BLOOD SUGAR: ICD-10-CM

## 2024-01-03 DIAGNOSIS — E78.5 HYPERLIPIDEMIA, UNSPECIFIED HYPERLIPIDEMIA TYPE: ICD-10-CM

## 2024-01-03 LAB
25(OH)D3 SERPL-MCNC: 39 NG/ML (ref 30–100)
ALBUMIN SERPL BCP-MCNC: 4.5 G/DL (ref 3.5–5)
ALP SERPL-CCNC: 80 U/L (ref 34–104)
ALT SERPL W P-5'-P-CCNC: 32 U/L (ref 7–52)
ANION GAP SERPL CALCULATED.3IONS-SCNC: 9 MMOL/L
AST SERPL W P-5'-P-CCNC: 16 U/L (ref 13–39)
BILIRUB SERPL-MCNC: 0.59 MG/DL (ref 0.2–1)
BILIRUB UR QL STRIP: NEGATIVE
BUN SERPL-MCNC: 16 MG/DL (ref 5–25)
CALCIUM SERPL-MCNC: 9.8 MG/DL (ref 8.4–10.2)
CHLORIDE SERPL-SCNC: 100 MMOL/L (ref 96–108)
CHOLEST SERPL-MCNC: 227 MG/DL
CLARITY UR: CLEAR
CO2 SERPL-SCNC: 28 MMOL/L (ref 21–32)
COLOR UR: NORMAL
CREAT SERPL-MCNC: 1.07 MG/DL (ref 0.6–1.3)
CREAT UR-MCNC: 114.3 MG/DL
ERYTHROCYTE [DISTWIDTH] IN BLOOD BY AUTOMATED COUNT: 13.2 % (ref 11.6–15.1)
EST. AVERAGE GLUCOSE BLD GHB EST-MCNC: 166 MG/DL
GFR SERPL CREATININE-BSD FRML MDRD: 76 ML/MIN/1.73SQ M
GLUCOSE P FAST SERPL-MCNC: 142 MG/DL (ref 65–99)
GLUCOSE UR STRIP-MCNC: NEGATIVE MG/DL
HBA1C MFR BLD: 7.4 %
HCT VFR BLD AUTO: 48.4 % (ref 36.5–49.3)
HDLC SERPL-MCNC: 43 MG/DL
HGB BLD-MCNC: 16.2 G/DL (ref 12–17)
HGB UR QL STRIP.AUTO: NEGATIVE
KETONES UR STRIP-MCNC: NEGATIVE MG/DL
LDLC SERPL CALC-MCNC: 130 MG/DL (ref 0–100)
LEUKOCYTE ESTERASE UR QL STRIP: NEGATIVE
MCH RBC QN AUTO: 28.8 PG (ref 26.8–34.3)
MCHC RBC AUTO-ENTMCNC: 33.5 G/DL (ref 31.4–37.4)
MCV RBC AUTO: 86 FL (ref 82–98)
MICROALBUMIN UR-MCNC: 11.7 MG/L
MICROALBUMIN/CREAT 24H UR: 10 MG/G CREATININE (ref 0–30)
NITRITE UR QL STRIP: NEGATIVE
NONHDLC SERPL-MCNC: 184 MG/DL
PH UR STRIP.AUTO: 5.5 [PH]
PLATELET # BLD AUTO: 202 THOUSANDS/UL (ref 149–390)
PMV BLD AUTO: 9.8 FL (ref 8.9–12.7)
POTASSIUM SERPL-SCNC: 4.2 MMOL/L (ref 3.5–5.3)
PROT SERPL-MCNC: 7.6 G/DL (ref 6.4–8.4)
PROT UR STRIP-MCNC: NEGATIVE MG/DL
PSA SERPL-MCNC: 1.74 NG/ML (ref 0–4)
RBC # BLD AUTO: 5.62 MILLION/UL (ref 3.88–5.62)
SODIUM SERPL-SCNC: 137 MMOL/L (ref 135–147)
SP GR UR STRIP.AUTO: 1.01 (ref 1–1.03)
TRIGL SERPL-MCNC: 269 MG/DL
TSH SERPL DL<=0.05 MIU/L-ACNC: 3.39 UIU/ML (ref 0.45–4.5)
UROBILINOGEN UR STRIP-ACNC: <2 MG/DL
WBC # BLD AUTO: 6.71 THOUSAND/UL (ref 4.31–10.16)

## 2024-01-03 PROCEDURE — 74177 CT ABD & PELVIS W/CONTRAST: CPT

## 2024-01-03 PROCEDURE — 80053 COMPREHEN METABOLIC PANEL: CPT

## 2024-01-03 PROCEDURE — 84443 ASSAY THYROID STIM HORMONE: CPT

## 2024-01-03 PROCEDURE — 80061 LIPID PANEL: CPT

## 2024-01-03 PROCEDURE — 82570 ASSAY OF URINE CREATININE: CPT | Performed by: FAMILY MEDICINE

## 2024-01-03 PROCEDURE — 85027 COMPLETE CBC AUTOMATED: CPT

## 2024-01-03 PROCEDURE — G0103 PSA SCREENING: HCPCS

## 2024-01-03 PROCEDURE — G1004 CDSM NDSC: HCPCS

## 2024-01-03 PROCEDURE — 82043 UR ALBUMIN QUANTITATIVE: CPT | Performed by: FAMILY MEDICINE

## 2024-01-03 PROCEDURE — 82306 VITAMIN D 25 HYDROXY: CPT

## 2024-01-03 PROCEDURE — 36415 COLL VENOUS BLD VENIPUNCTURE: CPT

## 2024-01-03 PROCEDURE — 81003 URINALYSIS AUTO W/O SCOPE: CPT | Performed by: FAMILY MEDICINE

## 2024-01-03 PROCEDURE — 83036 HEMOGLOBIN GLYCOSYLATED A1C: CPT

## 2024-01-03 RX ADMIN — IOHEXOL 100 ML: 350 INJECTION, SOLUTION INTRAVENOUS at 16:56

## 2024-01-03 NOTE — ASSESSMENT & PLAN NOTE
His BMI is 50.38 associated with diabetes. I advised to evaluate right upper quadrant pain first. Needs cholecystectomy once any infection is under control and then consider gastric bypass, sleeve surgery, or get Mounjaro.

## 2024-01-03 NOTE — PROGRESS NOTES
Assessment/Plan:          1. Right upper quadrant abdominal pain  Assessment & Plan:  Associated with fever and gallbladder disease. Differential diagnosis is gallbladder disease, cholecystitis specifically. Uncontrolled type 2 diabetes. Morbid obesity. Possible ulcer disease. Unlikely herpes zoster as this has been going on sometime. I will follow up with lab tests stat in the morning, CT scan abdomen and pelvis in the morning, and surgical consult tomorrow with his surgeon, Dr. Parks.    Orders:  -     CBC; Future  -     UA w Reflex to Microscopic w Reflex to Culture  -     CT abdomen pelvis w contrast; Future; Expected date: 01/03/2024  -     Ambulatory referral to General Surgery; Future; Expected date: 01/03/2024    2. Hyperlipidemia, unspecified hyperlipidemia type  -     CBC; Future  -     UA w Reflex to Microscopic w Reflex to Culture  -     Comprehensive metabolic panel; Future  -     Lipid panel; Future  -     TSH, 3rd generation; Future  -     Vitamin D 25 hydroxy; Future  -     Hemoglobin A1C; Future  -     Albumin / creatinine urine ratio  -     PSA, Total Screen; Future; Expected date: 01/03/2024    3. Low grade fever  -     CBC; Future  -     UA w Reflex to Microscopic w Reflex to Culture  -     Ambulatory referral to General Surgery; Future; Expected date: 01/03/2024    4. Vitamin D insufficiency  -     Vitamin D 25 hydroxy; Future    5. Medication management    6. Elevated blood sugar  -     Hemoglobin A1C; Future    7. Type 2 diabetes mellitus without complication, without long-term current use of insulin (HCC)    8. Increased BMI  Assessment & Plan:  His BMI is 50.38 associated with diabetes. I advised to evaluate right upper quadrant pain first. Needs cholecystectomy once any infection is under control and then consider gastric bypass, sleeve surgery, or get Mounjaro.          Prediabetes.  His last hemoglobin A1c was 7.2%. I will order hemoglobin A1c.                Subjective:       Patient  "ID: Farhad Montaño is a 58 y.o. male who presents for evaluation of right upper quadrant pain. He is accompanied by his wife.    He is experiencing intermittent right upper quadrant abdominal pain that started about 2 weeks ago or longer. The pain at times subsides and then intensifies. The pain radiates around his back. He wonders if the pain is related to eating. He has bloating and indigestion because he is on Protonix. He never gets heartburn. He denies any nausea. He has a hernia, but it does not bother him.    He has had kidney stones in the past.    He does not check his blood glucose at home. His last A1c was 7.2%. He has joined a [employer] [facility] so he can swim and walk. He is making changes to his diet. He does not want to start Ozempic or Saxenda.    His wife reported that she can hear him wheeze when sitting next to him. He has inhalers, but he never took them. He denies any fevers or chills.     He thinks he needs 2 knee replacements. He wears braces. He was an athletic person and ran half marathons. He thinks he damaged his legs the most because he ran 15 miles every day.    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    He is allergic to ERYTHROMYCIN.    He has received Pneumovax and MMR vaccines.    The following portions of the patient's history were reviewed and updated as appropriate: allergies, current medications, past family history, past medical history, past social history, past surgical history, and problem list.          Objective:       /76 (BP Location: Left arm, Patient Position: Sitting, Cuff Size: Standard)   Pulse 99   Temp 100.2 °F (37.9 °C) (Temporal)   Ht 5' 3\" (1.6 m)   Wt 129 kg (284 lb 6.4 oz)   SpO2 96%   BMI 50.38 kg/m²          Physical Exam  Vitals and nursing note reviewed.   Constitutional:       General: He is not in acute distress.     Appearance: Normal appearance. He is well-developed.   HENT:      Head: Normocephalic and " atraumatic.   Eyes:      General:         Right eye: No discharge.         Left eye: No discharge.   Neck:      Thyroid: No thyromegaly.   Cardiovascular:      Rate and Rhythm: Normal rate and regular rhythm.      Pulses: Normal pulses.      Heart sounds: Normal heart sounds. No murmur heard.  Pulmonary:      Effort: Pulmonary effort is normal.      Breath sounds: Normal breath sounds. No wheezing or rhonchi.   Musculoskeletal:      Cervical back: Neck supple.      Right lower leg: No edema.      Left lower leg: No edema.   Lymphadenopathy:      Cervical: No cervical adenopathy.   Skin:     General: Skin is warm.      Capillary Refill: Capillary refill takes less than 2 seconds.   Neurological:      General: No focal deficit present.      Mental Status: He is alert and oriented to person, place, and time.   Psychiatric:         Mood and Affect: Mood normal.         Behavior: Behavior normal.         Thought Content: Thought content normal.          I personally reviewed the recent (and prior)  lab results, the image studies, pathology, other specialty/physicians consult notes and recommendations, and outside medical records from other institutions, as appropriate. I had a lengthy discussion with the patient and shared the work-up findings. We discussed the diagnosis and management plan.  I spent  45  minutes reviewing the records (labs, clinician notes, outside records, medical history, ordering medicine/tests/procedures, interpreting the imaging/labs previously done) and coordination of care as well as direct time with the patient today, of which greater than 50% of the time was spent in counseling and coordination of care with the patient/family.  Obviously, this is a complex patient as I have never met him before he presents with his wife very very nice couple.  He has had about 10 days to weeks upper right quadrant distress some belching burping and bloating low-grade fever 100.2 at this moment no nausea or  vomiting some nausea but no vomiting no change about.  No sugar rigors chills etc.  Differential diagnosis is likely gallbladder disease cholecystitis specifically likely uncontrolled type 2 diabetes morbid obesity possible ulcer disease unlikely herpes zoster as this has been going on some time etc.  Will follow-up with lab tests stat in the morning, CT scan abdomen and pelvis in the morning and surgical consult tomorrow with his surgeon Dr. Hubbard.      Transcribed for AMANDA Clark DO, by Foster Burciaga on 01/06/24 at 3:00 PM. Powered by [employer].

## 2024-01-03 NOTE — ASSESSMENT & PLAN NOTE
Associated with fever and gallbladder disease. Differential diagnosis is gallbladder disease, cholecystitis specifically. Uncontrolled type 2 diabetes. Morbid obesity. Possible ulcer disease. Unlikely herpes zoster as this has been going on sometime. I will follow up with lab tests stat in the morning, CT scan abdomen and pelvis in the morning, and surgical consult tomorrow with his surgeon, Dr. Parks.

## 2024-01-04 ENCOUNTER — TELEPHONE (OUTPATIENT)
Age: 59
End: 2024-01-04

## 2024-01-04 NOTE — TELEPHONE ENCOUNTER
Discussed w PCP and he wanted to make pt aware that there are no acute abnormalities w recent imaging that pt is unaware of -- did want to inform pt about elevated fasting blood sugar (142) and A1C (7.4) as well as elevated cholesterol requiring a statin (to be discussed at time of next OV). PCP also advised cont. Vit D3 as current is higher than previous but not in ideal range. UA was normal.     Called pt -- no answer so Left voicemail informing. Pt advised to call back if there are any further questions or concerns.

## 2024-01-04 NOTE — TELEPHONE ENCOUNTER
Patient called and would like results of CT scan and lab results, would like reviewed by Dr Clark, would like to have him as PCP. Patient would like a call back at [phone] and also needs to know when he can return to work. (Does not require a note).

## 2024-01-05 NOTE — TELEPHONE ENCOUNTER
Pt called again. He did not get our message yesterday. Providers message given.     He is still experiencing faint pain, almost like a cramp (4 out of 10).      When should he make his next appt for? He would like to see Dr Clark and have him as his pcp. He does have an appt with Dr Hubbard on 1/8/24.    Please advise.

## 2024-01-08 ENCOUNTER — OFFICE VISIT (OUTPATIENT)
Dept: FAMILY MEDICINE CLINIC | Facility: CLINIC | Age: 59
End: 2024-01-08
Payer: COMMERCIAL

## 2024-01-08 VITALS
TEMPERATURE: 97.9 F | HEART RATE: 101 BPM | OXYGEN SATURATION: 92 % | SYSTOLIC BLOOD PRESSURE: 128 MMHG | BODY MASS INDEX: 50.32 KG/M2 | WEIGHT: 284 LBS | DIASTOLIC BLOOD PRESSURE: 72 MMHG | HEIGHT: 63 IN | RESPIRATION RATE: 16 BRPM

## 2024-01-08 DIAGNOSIS — J98.11 COLLAPSE OF RIGHT LUNG: ICD-10-CM

## 2024-01-08 DIAGNOSIS — Z79.899 MEDICATION MANAGEMENT: ICD-10-CM

## 2024-01-08 DIAGNOSIS — R73.9 ELEVATED BLOOD SUGAR: Primary | ICD-10-CM

## 2024-01-08 DIAGNOSIS — E66.01 CLASS 3 SEVERE OBESITY DUE TO EXCESS CALORIES WITHOUT SERIOUS COMORBIDITY WITH BODY MASS INDEX (BMI) OF 50.0 TO 59.9 IN ADULT (HCC): ICD-10-CM

## 2024-01-08 DIAGNOSIS — Z79.899 ENCOUNTER FOR LONG-TERM (CURRENT) USE OF MEDICATIONS: ICD-10-CM

## 2024-01-08 DIAGNOSIS — E11.9 TYPE 2 DIABETES MELLITUS WITHOUT COMPLICATION, WITHOUT LONG-TERM CURRENT USE OF INSULIN (HCC): ICD-10-CM

## 2024-01-08 DIAGNOSIS — Z71.2 ENCOUNTER TO DISCUSS TEST RESULTS: ICD-10-CM

## 2024-01-08 PROBLEM — E66.813 CLASS 3 SEVERE OBESITY DUE TO EXCESS CALORIES WITHOUT SERIOUS COMORBIDITY WITH BODY MASS INDEX (BMI) OF 50.0 TO 59.9 IN ADULT (HCC): Status: ACTIVE | Noted: 2024-01-08

## 2024-01-08 PROCEDURE — 99215 OFFICE O/P EST HI 40 MIN: CPT | Performed by: FAMILY MEDICINE

## 2024-01-08 NOTE — PROGRESS NOTES
Assessment/Plan:          1. Elevated blood sugar  -     Ambulatory referral to Weight Management; Future; Expected date: 01/22/2024    2. Type 2 diabetes mellitus without complication, without long-term current use of insulin (Formerly [facility])  Assessment & Plan:  The patient was prescribed metformin but experienced diarrhea early on and refused it. Jardiance was also prescribed, but the patient never started that. It may be viable, but given his BMI of 50.31 and severe arthritis in both knees, which is “bone on bone”, the orthopedic surgeon, Dr. Handley will not operate until he loses 50 pounds. Currently, he weighs 284 pounds. He is an ideal candidate for Mounjaro and will start a 5 mg weekly regimen. I have demonstrated the pen to him and informed him of the pros and cons, common side effects, and potential complications such as gastroparesis. I also mentioned contraindications, including MEN2 and medullary thyroid cancer in the patient or family. He denies having any of these conditions. He has stated that he was diagnosed with sarcoidosis in the past but was told that “he does not have it any longer”. However, there is still a question regarding the “right middle lobe of lung collapsed”. This has been observed on several occasions in the past. I am unsure if he has undergone a pulmonary work-up, but I plan to request an opinion, especially considering his history of sarcoidosis. He mentioned that he was prescribed Advair or a similar device for “wheezing”, but he never started using it. For now, he will continue with his current medications and add Mounjaro to his regimen. His last hemoglobin A1c was 7.4 %. He has not been checking his blood glucose at home, but he will begin to do so at least twice weekly, once in the morning and at night, for at least 2 to 3 days a week. He seems highly motivated. I will implement the medical weight loss program as he begins Mounjaro. He continues to work out and exercise for 1.5 hours  at the swimming pool several times a week, giving himself a good workout. Again, he is highly motivated.    Orders:  -     Ambulatory referral to Weight Management; Future; Expected date: 01/22/2024  -     tirzepatide (Mounjaro) 5 MG/0.5ML; Inject 0.5 mL (5 mg total) under the skin every 7 days    3. Class 3 severe obesity due to excess calories without serious comorbidity with body mass index (BMI) of 50.0 to 59.9 in adult (HCC)  Assessment & Plan:  An ambulatory referral to weight management was placed. Mounjaro was recommended and side effects were discussed with the patient. The patient weighs 284 lbs and loves to cook particularly healthy meals. He needs bilateral knee replacements. However, he has been advised to lose 50 lbs before the surgery. His hemoglobin A1c was 7.4 %. He will start taking Mounjaro 5 mg once a week for 1 to 2 months and then 7.5 mg once a week for 1 or 2 months to see how he does. He was advised to check his blood sugar 3 times a week at various times of the day.    Orders:  -     Ambulatory referral to Weight Management; Future; Expected date: 01/22/2024  -     tirzepatide (Mounjaro) 5 MG/0.5ML; Inject 0.5 mL (5 mg total) under the skin every 7 days    4. Collapse of right lung  Assessment & Plan:  The patient has had a chronic right middle lobe collapse for years. Further tests may need to be done. He has a history of sarcoidosis. An ambulatory referral to Pulmonology was placed.    Orders:  -     Ambulatory referral to Pulmonology; Future; Expected date: 01/22/2024    5. Encounter to discuss test results    6. Medication management    7. Encounter for long-term (current) use of medications        Upper right quadrant pain.  His pain may be coming from a pinched nerve in the back. He was encouraged to lose weight.    The patient will follow up in 2 months.                  Subjective:       Patient ID: Farhad Montaño is a 58 y.o. male who presents due to upper right quadrant pain  which radiates around to the back.    The patient is experiencing pain in the upper right quadrant which radiates around the back. He underwent an abdominal ultrasound at [facility], which yielded negative results, as did his blood work, showing no signs of pancreatitis or any other issues. Dr. Hubbard suggested the possibility of a kidney stone, although this would be unusual.    He was prescribed metformin, but he has not taken it. He reported severe gastrointestinal side effects after the initial dose. Taty suggested a switch to Jardiance, but the patient has not yet obtained this medication. The patient does not monitor his blood glucose levels at home despite having a glucometer.    Dr. Hubbard removed a lipoma from his back, which weighed 7.5 lbs to 8 lbs.    He just joined the [facility] for his knees.    The patient has a history of sarcoidosis, previously believed to be resolved. He reported that his liver was pressing against his lungs, causing partial collapse, an issue he has experienced before. He mentioned that sarcoidosis can cause inflammation anywhere in the body. During this event, he experienced significant swelling in his joints, knees, and elbows. He is concerned that his sarcoidosis may be currently active. He also reported wheezing and disclosed that he was previously prescribed an inhaler, which he did not use. He underwent a bronchoscopy years ago.    The patient has no history of smoking or alcohol consumption.    He worked for [employer].    He has a family history of arteriosclerosis.    His hemoglobin A1c was 7.4 %. His cholesterol levels are elevated, with triglyceride levels recorded at 269 mg/dL.    He had a CT scan on 01/03/2024, which was revealed to be normal. The lower chest reveals chronic right middle lobe atelectasis with endobronchial obstruction again noted. There is no pericardial or pleural effusion. The biliary tree reveals the liver is  "enlarged and diffusely decreased in density, consistent with fatty change. The gallbladder reveals no calcified gallstones and no pericholecystic inflammatory change. The spleen, pancreas, and adrenal glands are all unremarkable. There is colonic diverticulosis without evidence of acute diverticulitis and no bowel obstruction. The appendix is normal. There is no ascites. Atherosclerotic changes are present. There is no evidence of an aneurysm.    Chest x-ray revealed chronic right middle lobe collapse, better demonstrated on the CT scan from 07/2021. Lungs otherwise clear. No pleural effusion or pneumothorax.    The following portions of the patient's history were reviewed and updated as appropriate: allergies, current medications, past family history, past medical history, past social history, past surgical history, and problem list.      Review of systems  Gastrointestinal: Positive for pain in the upper right quadrant.          Objective:       /72 (BP Location: Left arm, Patient Position: Sitting, Cuff Size: Large)   Pulse 101   Temp 97.9 °F (36.6 °C) (Temporal)   Resp 16   Ht 5' 3\" (1.6 m)   Wt 129 kg (284 lb)   SpO2 92%   BMI 50.31 kg/m²          Physical Exam  Vitals and nursing note reviewed.  Constitutional:       General: He is not in acute distress.     Appearance: Normal appearance. He is well-developed.   HENT:      Head: Normocephalic and atraumatic.   Eyes:      General:         Right eye: No discharge.         Left eye: No discharge.   Neck:      Thyroid: No thyromegaly.   Cardiovascular:      Rate and Rhythm: Normal rate and regular rhythm.      Pulses: Normal pulses.      Heart sounds: Normal heart sounds. No murmur heard.  Pulmonary:      Effort: Pulmonary effort is normal.      Breath sounds: Normal breath sounds. No wheezing or rhonchi.   Musculoskeletal:      Cervical back: Neck supple.      Right lower leg: No edema.      Left lower leg: No edema.   Lymphadenopathy:      Cervical: " No cervical adenopathy.   Skin:     General: Skin is warm.      Capillary Refill: Capillary refill takes less than 2 seconds.   Neurological:      General: No focal deficit present.      Mental Status: He is alert and oriented to person, place, and time.   Psychiatric:         Mood and Affect: Mood normal.         Behavior: Behavior normal.         Thought Content: Thought content normal.          I personally reviewed the recent (and prior)  lab results, the image studies, pathology, other specialty/physicians consult notes and recommendations, and outside medical records from other institutions, as appropriate. I had a lengthy discussion with the patient and shared the work-up findings. We discussed the diagnosis and management plan.  I spent  50  minutes reviewing the records (labs, clinician notes, outside records, medical history, ordering medicine/tests/procedures, interpreting the imaging/labs previously done) and coordination of care as well as direct time with the patient today, of which greater than 50% of the time was spent in counseling and coordination of care with the patient/family.  This patient remains new patient to me--seen only once for limited problem last week of upper right quadrant pain.  Extensive workup then and still underway.  Many issues discussed at this time all particularly noted in the visit diagnosis.  Multiple consult consults and detailed discussion of diabetic planning and weight loss    Transcribed for AMANDA Clark DO, by Meron Funes on 01/15/24 at 10:26 PM. Powered by Dragon [employer].

## 2024-01-08 NOTE — ASSESSMENT & PLAN NOTE
An ambulatory referral to weight management was placed. Mounjaro was recommended and side effects were discussed with the patient. The patient weighs 284 lbs and loves to cook particularly healthy meals. He needs bilateral knee replacements. However, he has been advised to lose 50 lbs before the surgery. His hemoglobin A1c was 7.4 %. He will start taking Mounjaro 5 mg once a week for 1 to 2 months and then 7.5 mg once a week for 1 or 2 months to see how he does. He was advised to check his blood sugar 3 times a week at various times of the day.

## 2024-01-08 NOTE — ASSESSMENT & PLAN NOTE
The patient has had a chronic right middle lobe collapse for years. Further tests may need to be done. He has a history of sarcoidosis. An ambulatory referral to Pulmonology was placed.

## 2024-01-08 NOTE — ASSESSMENT & PLAN NOTE
The patient was prescribed metformin but experienced diarrhea early on and refused it. Jardiance was also prescribed, but the patient never started that. It may be viable, but given his BMI of 50.31 and severe arthritis in both knees, which is “bone on bone”, the orthopedic surgeon, Dr. Handley will not operate until he loses 50 pounds. Currently, he weighs 284 pounds. He is an ideal candidate for Mounjaro and will start a 5 mg weekly regimen. I have demonstrated the pen to him and informed him of the pros and cons, common side effects, and potential complications such as gastroparesis. I also mentioned contraindications, including MEN2 and medullary thyroid cancer in the patient or family. He denies having any of these conditions. He has stated that he was diagnosed with sarcoidosis in the past but was told that “he does not have it any longer”. However, there is still a question regarding the “right middle lobe of lung collapsed”. This has been observed on several occasions in the past. I am unsure if he has undergone a pulmonary work-up, but I plan to request an opinion, especially considering his history of sarcoidosis. He mentioned that he was prescribed Advair or a similar device for “wheezing”, but he never started using it. For now, he will continue with his current medications and add Mounjaro to his regimen. His last hemoglobin A1c was 7.4 %. He has not been checking his blood glucose at home, but he will begin to do so at least twice weekly, once in the morning and at night, for at least 2 to 3 days a week. He seems highly motivated. I will implement the medical weight loss program as he begins Mounjaro. He continues to work out and exercise for 1.5 hours at the swimming pool several times a week, giving himself a good workout. Again, he is highly motivated.

## 2024-01-10 ENCOUNTER — CONSULT (OUTPATIENT)
Dept: SURGERY | Facility: CLINIC | Age: 59
End: 2024-01-10
Payer: COMMERCIAL

## 2024-01-10 VITALS
DIASTOLIC BLOOD PRESSURE: 76 MMHG | BODY MASS INDEX: 51.1 KG/M2 | HEART RATE: 82 BPM | HEIGHT: 63 IN | OXYGEN SATURATION: 97 % | WEIGHT: 288.4 LBS | SYSTOLIC BLOOD PRESSURE: 130 MMHG | TEMPERATURE: 98 F

## 2024-01-10 DIAGNOSIS — R10.11 RIGHT UPPER QUADRANT ABDOMINAL PAIN: Primary | ICD-10-CM

## 2024-01-10 DIAGNOSIS — M25.562 KNEE PAIN, BILATERAL: Primary | ICD-10-CM

## 2024-01-10 DIAGNOSIS — R50.9 LOW GRADE FEVER: ICD-10-CM

## 2024-01-10 DIAGNOSIS — M25.561 KNEE PAIN, BILATERAL: Primary | ICD-10-CM

## 2024-01-10 PROCEDURE — 99214 OFFICE O/P EST MOD 30 MIN: CPT | Performed by: SURGERY

## 2024-01-10 NOTE — PROGRESS NOTES
Assessment/Plan: I reviewed the images of the CT scan abdomen and chest.  I also reviewed the images of the ultrasound.  Patient does not have any gallstones.  There does not seem to be any other intra-abdominal pathology except for a small fat-containing inguinal hernia which is asymptomatic and stable in size.  Patient does need to see a pulmonologist for middle lobe atelectasis.  I also referred him to see orthopedic surgeon for right knee arthritis.  Follow-up with me as needed.    No problem-specific Assessment & Plan notes found for this encounter.       Diagnoses and all orders for this visit:    Right upper quadrant abdominal pain  -     Ambulatory referral to General Surgery    Low grade fever  -     Ambulatory referral to General Surgery          Subjective:      Patient ID: Farhad Montaño is a 58 y.o. male.    58-year-old male patient who was sent to my office for right upper quadrant pain.  He says that the pain did not have any relationship with food.  It started suddenly and it has been persisting however the intensity is less.  He says it does not increase on taking deep breaths.  No nausea or vomiting.  No fevers.  Patient has been scheduled to see the pulmonologist because of right middle lobe atelectasis with endobronchial obstruction.  No weight loss.  Patient is also suffering from arthritis of the right knee.        The following portions of the patient's history were reviewed and updated as appropriate: allergies, current medications, past family history, past medical history, past social history, past surgical history, and problem list.    Review of Systems   Constitutional: Negative.    HENT: Negative.     Eyes: Negative.    Respiratory: Negative.     Cardiovascular: Negative.    Gastrointestinal:  Positive for abdominal pain. Negative for nausea and vomiting.   Endocrine: Negative.    Genitourinary: Negative.    Musculoskeletal:  Positive for arthralgias, gait problem and myalgias.  "  Skin: Negative.    Allergic/Immunologic: Negative.    Hematological: Negative.    Psychiatric/Behavioral: Negative.           Objective:      /76 (BP Location: Left arm, Patient Position: Sitting, Cuff Size: Standard)   Pulse 82   Temp 98 °F (36.7 °C) (Tympanic)   Ht 5' 3\" (1.6 m)   Wt 131 kg (288 lb 6.4 oz)   SpO2 97%   BMI 51.09 kg/m²          Physical Exam  Vitals reviewed.   Constitutional:       Appearance: He is obese.   HENT:      Head: Normocephalic.      Nose: Nose normal.      Mouth/Throat:      Mouth: Mucous membranes are moist.   Eyes:      General: No scleral icterus.     Pupils: Pupils are equal, round, and reactive to light.   Cardiovascular:      Rate and Rhythm: Normal rate and regular rhythm.      Pulses: Normal pulses.      Heart sounds: Normal heart sounds.   Pulmonary:      Effort: Pulmonary effort is normal.      Breath sounds: Normal breath sounds.   Abdominal:      General: Bowel sounds are normal.      Palpations: Abdomen is soft.      Tenderness: There is no abdominal tenderness. There is no guarding.   Musculoskeletal:         General: Normal range of motion.      Cervical back: Normal range of motion.   Skin:     General: Skin is warm.      Capillary Refill: Capillary refill takes less than 2 seconds.   Neurological:      Mental Status: He is alert.   Psychiatric:         Mood and Affect: Mood normal.           "

## 2024-02-09 ENCOUNTER — CONSULT (OUTPATIENT)
Dept: PULMONOLOGY | Facility: CLINIC | Age: 59
End: 2024-02-09
Payer: COMMERCIAL

## 2024-02-09 VITALS
HEIGHT: 63 IN | SYSTOLIC BLOOD PRESSURE: 128 MMHG | OXYGEN SATURATION: 98 % | BODY MASS INDEX: 50.64 KG/M2 | DIASTOLIC BLOOD PRESSURE: 70 MMHG | HEART RATE: 80 BPM | TEMPERATURE: 98.8 F | WEIGHT: 285.8 LBS

## 2024-02-09 DIAGNOSIS — J98.11 COLLAPSE OF RIGHT LUNG: ICD-10-CM

## 2024-02-09 DIAGNOSIS — R06.02 SOBOE (SHORTNESS OF BREATH ON EXERTION): ICD-10-CM

## 2024-02-09 DIAGNOSIS — E66.01 CLASS 3 SEVERE OBESITY DUE TO EXCESS CALORIES WITHOUT SERIOUS COMORBIDITY WITH BODY MASS INDEX (BMI) OF 50.0 TO 59.9 IN ADULT (HCC): ICD-10-CM

## 2024-02-09 DIAGNOSIS — J98.11 CHRONIC ATELECTASIS: Primary | ICD-10-CM

## 2024-02-09 PROCEDURE — 99204 OFFICE O/P NEW MOD 45 MIN: CPT | Performed by: INTERNAL MEDICINE

## 2024-02-09 RX ORDER — ALBUTEROL SULFATE 90 UG/1
2 AEROSOL, METERED RESPIRATORY (INHALATION) EVERY 6 HOURS PRN
Qty: 18 G | Refills: 1 | Status: SHIPPED | OUTPATIENT
Start: 2024-02-09

## 2024-02-09 NOTE — PROGRESS NOTES
Assessment/Plan:    Chronic atelectasis  Mr. Farhad Montaño has an abnormal CT scan showing chronic right middle lobe partial lung atelectasis.  He was suspected to have bronchial obstruction.  This finding is not new as it was present in 2021 on his CT scan.  At that time a bronchoscopy was suggested to him but deferred in view of COVID.  He has previous history of sarcoidosis.  He has bilateral knee arthritis and this interferes with his activities.  He did not any have any significant cough or phlegm and he has noted occasional wheezing.  On clinical examination, chest was clear to auscultation.  He may need bronchoscopy for further evaluation of his chronic right middle lobe atelectasis.  Since this has been present for a long time, it is not clear whether the right middle lobe can be opened up.  I had a long discussion with him and answered all his questions..    SOBOE (shortness of breath on exertion)  Shortness of breath on exertion and was previously advised to not Breo and Singulair which she did not did not take.  Currently his chest is clear.  He does not have any significant cough or phlegm.  He has occasional wheezing.  I have ordered a full PFT.  I have ordered an inhaler to be used as needed.    Class 3 severe obesity due to excess calories without serious comorbidity with body mass index (BMI) of 50.0 to 59.9 in adult (HCC)  He is morbidly obese and advised weight reduction.  He understands that weight reduction can significantly improve his symptoms.  He is being considered for Mounjaro.  He has obstructive sleep apnea and has been on CPAP therapy.       Diagnoses and all orders for this visit:    Chronic atelectasis    Collapse of right lung  -     Ambulatory referral to Pulmonology    Class 3 severe obesity due to excess calories without serious comorbidity with body mass index (BMI) of 50.0 to 59.9 in adult (HCC)    SOBOE (shortness of breath on exertion)  -     Complete PFT with post  Bronchodilator and Six Minute walk; Future  -     albuterol (Ventolin HFA) 90 mcg/act inhaler; Inhale 2 puffs every 6 (six) hours as needed for wheezing or shortness of breath          Subjective:      Patient ID: Farhad Montaño is a 58 y.o. male.    Mr.Thomas Montaño is a 58-year-old  male with past medical history of obstructive sleep apnea on CPAP therapy, sarcoidosis, arthritis of both knees and morbid obesity who has been referred regarding his abnormal CT scan of the chest which showed chronic right middle lobe partial lung atelectasis.  He is suspected to have some bronchial obstruction.  This is not a new finding and has been present since 2021 when a bronchoscopy was suggested to him.  It was during the time of COVID and this was deferred.  He has shortness of breath on exertion but he has bilateral knee arthritis which interferes with his activities.  He has occasional wheezing.  He denied any significant cough or phlegm.  He was previously prescribed Breo and Singulair which he did not use.  He has no history of allergies.  He stated that he was diagnosed with sarcoidosis after bronchoscopy and biopsy in his 30s.  He does not remember any chronic prednisone therapy.  He does not have any eye symptoms.  He has not been following with any ophthalmologist regularly.  He denied any skin lesions.  He denied any fever or chills or night sweats.  He denied any anorexia.  He has been following with Dr. Hubbard regarding his abdominal discomfort.  He has not had any recent PFT.    He was diagnosed with obstructive sleep apnea before and has been on CPAP therapy.  He follows with Dr. Comer.    He is morbidly obese and understands the need for weight reduction.  He is being considered for Mounjaro.  He follows with Dr. Clark for this.  His HbA1c has also been elevated.  He does not follow with any endocrinologist.  He stated that he is trying to lose weight on his own.  He does not smoke and has no  "history of alcohol.        The following portions of the patient's history were reviewed and updated as appropriate: allergies, current medications, past family history, past medical history, past social history, past surgical history, and problem list.    Review of Systems   Constitutional:  Negative for appetite change, chills, fatigue and fever.   HENT:  Negative for hearing loss, rhinorrhea, sneezing, sore throat, trouble swallowing and voice change.    Eyes:  Negative for visual disturbance.   Respiratory:  Positive for shortness of breath and wheezing. Negative for cough and stridor.    Cardiovascular:  Negative for chest pain, palpitations and leg swelling.   Gastrointestinal:  Negative for abdominal pain, constipation, diarrhea, nausea and vomiting.   Genitourinary:  Negative for dysuria, frequency and urgency.   Musculoskeletal:  Positive for arthralgias. Negative for gait problem.   Skin:  Negative for rash.   Allergic/Immunologic: Negative for environmental allergies.   Neurological:  Negative for dizziness, syncope, light-headedness and headaches.   Psychiatric/Behavioral:  Positive for sleep disturbance. Negative for agitation and confusion. The patient is not nervous/anxious.          Objective:      /70 (BP Location: Left arm, Patient Position: Sitting, Cuff Size: Large)   Pulse 80   Temp 98.8 °F (37.1 °C) (Tympanic)   Ht 5' 3\" (1.6 m)   Wt 130 kg (285 lb 12.8 oz)   SpO2 98%   BMI 50.63 kg/m²          Physical Exam  Vitals reviewed.   Constitutional:       General: He is not in acute distress.     Appearance: He is obese. He is not ill-appearing, toxic-appearing or diaphoretic.      Interventions: He is not intubated.  HENT:      Head: Normocephalic.      Mouth/Throat:      Mouth: Mucous membranes are moist.      Pharynx: Oropharynx is clear.      Comments: Oropharyngeal crowding  Neck:      Vascular: No JVD.      Trachea: No tracheal deviation.   Cardiovascular:      Rate and Rhythm: " Normal rate and regular rhythm.      Heart sounds: Normal heart sounds. No murmur heard.  Pulmonary:      Effort: No accessory muscle usage or respiratory distress. He is not intubated.      Breath sounds: Normal breath sounds. No stridor. No decreased breath sounds, wheezing, rhonchi or rales.   Chest:      Chest wall: No tenderness.   Abdominal:      General: Bowel sounds are normal.      Palpations: Abdomen is soft.      Tenderness: There is no abdominal tenderness. There is no guarding.   Musculoskeletal:      Cervical back: Neck supple.      Right lower leg: No edema.      Left lower leg: No edema.   Lymphadenopathy:      Cervical: No cervical adenopathy.   Skin:     Coloration: Skin is not cyanotic or pale.      Findings: No rash.      Nails: There is no clubbing.   Neurological:      Mental Status: He is alert and oriented to person, place, and time.   Psychiatric:         Mood and Affect: Mood normal. Mood is not anxious.         Behavior: Behavior normal. Behavior is not agitated.

## 2024-02-12 NOTE — ASSESSMENT & PLAN NOTE
He is morbidly obese and advised weight reduction.  He understands that weight reduction can significantly improve his symptoms.  He is being considered for Mounjaro.  He has obstructive sleep apnea and has been on CPAP therapy.

## 2024-02-12 NOTE — ASSESSMENT & PLAN NOTE
Shortness of breath on exertion and was previously advised to not Breo and Singulair which she did not did not take.  Currently his chest is clear.  He does not have any significant cough or phlegm.  He has occasional wheezing.  I have ordered a full PFT.  I have ordered an inhaler to be used as needed.

## 2024-02-12 NOTE — ASSESSMENT & PLAN NOTE
Mr. Farhad Montaño has an abnormal CT scan showing chronic right middle lobe partial lung atelectasis.  He was suspected to have bronchial obstruction.  This finding is not new as it was present in 2021 on his CT scan.  At that time a bronchoscopy was suggested to him but deferred in view of COVID.  He has previous history of sarcoidosis.  He has bilateral knee arthritis and this interferes with his activities.  He did not any have any significant cough or phlegm and he has noted occasional wheezing.  On clinical examination, chest was clear to auscultation.  He may need bronchoscopy for further evaluation of his chronic right middle lobe atelectasis.  Since this has been present for a long time, it is not clear whether the right middle lobe can be opened up.  I had a long discussion with him and answered all his questions..

## 2024-02-21 PROBLEM — R50.9 LOW GRADE FEVER: Status: RESOLVED | Noted: 2024-01-02 | Resolved: 2024-02-21

## 2024-04-01 ENCOUNTER — TELEPHONE (OUTPATIENT)
Age: 59
End: 2024-04-01

## 2024-04-01 NOTE — TELEPHONE ENCOUNTER
Pt stated form was dropped off on 3/29/24 to be completed by PCP. Pt wanted the status. Please contact pt when form is filled out. Thank you for your kind assistance.

## 2024-04-01 NOTE — TELEPHONE ENCOUNTER
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

## 2024-04-29 DIAGNOSIS — E78.5 HYPERLIPIDEMIA, UNSPECIFIED HYPERLIPIDEMIA TYPE: ICD-10-CM

## 2024-04-29 RX ORDER — ATORVASTATIN CALCIUM 10 MG/1
10 TABLET, FILM COATED ORAL DAILY
Qty: 90 TABLET | Refills: 1 | Status: SHIPPED | OUTPATIENT
Start: 2024-04-29

## 2024-05-17 DIAGNOSIS — K21.9 GASTROESOPHAGEAL REFLUX DISEASE, UNSPECIFIED WHETHER ESOPHAGITIS PRESENT: ICD-10-CM

## 2024-05-17 RX ORDER — PANTOPRAZOLE SODIUM 40 MG/1
40 TABLET, DELAYED RELEASE ORAL
Qty: 90 TABLET | Refills: 1 | Status: SHIPPED | OUTPATIENT
Start: 2024-05-17

## 2024-07-01 ENCOUNTER — OFFICE VISIT (OUTPATIENT)
Dept: FAMILY MEDICINE CLINIC | Facility: CLINIC | Age: 59
End: 2024-07-01
Payer: COMMERCIAL

## 2024-07-01 VITALS
RESPIRATION RATE: 18 BRPM | TEMPERATURE: 98.4 F | WEIGHT: 290.2 LBS | OXYGEN SATURATION: 94 % | SYSTOLIC BLOOD PRESSURE: 130 MMHG | HEIGHT: 63 IN | HEART RATE: 102 BPM | BODY MASS INDEX: 51.42 KG/M2 | DIASTOLIC BLOOD PRESSURE: 70 MMHG

## 2024-07-01 DIAGNOSIS — M17.0 PRIMARY OSTEOARTHRITIS OF BOTH KNEES: Primary | ICD-10-CM

## 2024-07-01 DIAGNOSIS — E78.2 MIXED HYPERLIPIDEMIA: ICD-10-CM

## 2024-07-01 DIAGNOSIS — E11.9 TYPE 2 DIABETES MELLITUS WITHOUT COMPLICATION, WITHOUT LONG-TERM CURRENT USE OF INSULIN (HCC): ICD-10-CM

## 2024-07-01 DIAGNOSIS — Z23 ENCOUNTER FOR IMMUNIZATION: ICD-10-CM

## 2024-07-01 PROCEDURE — 99214 OFFICE O/P EST MOD 30 MIN: CPT | Performed by: FAMILY MEDICINE

## 2024-07-01 PROCEDURE — 90750 HZV VACC RECOMBINANT IM: CPT | Performed by: FAMILY MEDICINE

## 2024-07-01 PROCEDURE — 90471 IMMUNIZATION ADMIN: CPT | Performed by: FAMILY MEDICINE

## 2024-07-01 NOTE — PROGRESS NOTES
Subjective:      Patient ID: Farhad Montaño is a 59 y.o. male.    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    GERD (gastroesophageal reflux disease)     Joint pain in both hands 02/19/2021    Kidney stone     Kidney stones 02/19/2021    Mass of anterior abdominal wall 02/19/2021    LLQ 9x6 cm    MI (myocardial infarction) (HCC)     Morbid obesity with BMI of 50.0-59.9, adult (HCC)     Myocardial infarction (HCC) 06/11/2004    Need for hepatitis B vaccination 07/15/2021    Need for MMR vaccine 07/15/2021    Nonimmune to hepatitis B virus 03/04/2021    Obesity     Parotid gland enlargement 02/19/2021    left    Prediabetes 03/04/2021    Primary osteoarthritis of left knee 07/15/2021    RUQ pain 07/15/2021    Sarcoidosis 02/19/2021    Sleep apnea     cpap    Soft tissue mass 02/19/2021    R upper back 9x 11x3cm L upper back 14x20x 4 cm       Family History   Problem Relation Age of Onset    Heart disease Mother     Diabetes Mother     Heart disease Father     Cancer Father         blood cancer    Heart disease Paternal Aunt     Heart disease Paternal Uncle     Diabetes Maternal Grandfather     Stroke Neg Hx        Past Surgical History:   Procedure Laterality Date    BRONCHOSCOPY      COLONOSCOPY      EGD      HAND SURGERY Bilateral 2019    trigger finger and carpal tunnel     KNEE ARTHROSCOPY      meniscus surgery    SD EXCISION TUMOR SOFT TIS BACK/FLANK SUBQ 3 CM/> N/A  4/8/2021    Procedure: excision large mass back subfascial  25x 13x 3;  Surgeon: Angel Hubbard MD;  Location:  MAIN OR;  Service: General    VASECTOMY          reports that he has never smoked. He has never used smokeless tobacco. He reports that he does not drink alcohol and does not use drugs.      Current Outpatient Medications:     albuterol (Ventolin HFA) 90 mcg/act inhaler, Inhale 2 puffs every 6 (six) hours as needed for wheezing or shortness of breath, Disp: 18 g, Rfl: 1    Ascorbic Acid (vitamin C) 100 MG tablet, Take 100 mg by mouth daily, Disp: , Rfl:     atorvastatin (LIPITOR) 10 mg tablet, TAKE 1 TABLET BY MOUTH EVERY DAY, Disp: 90 tablet, Rfl: 1    Cholecalciferol (VITAMIN D PO), Take 10,000 Units by mouth every other day , Disp: , Rfl:     Continuous Glucose  (FreeStyle Mina 3 [place]) [facility], Use 1 each 1 (one) time for 1 dose, Disp: 1 each, Rfl: 0    Continuous Glucose Sensor (FreeStyle Mina 3 Sensor) MISC, Use 1 each every 14 (fourteen) days, Disp: 6 each, Rfl: 3    escitalopram (LEXAPRO) 10 mg tablet, Take 1 tablet (10 mg total) by mouth daily, Disp: 90 tablet, Rfl: 3    Multiple Vitamins-Minerals (multivitamin with minerals) tablet, Take 1 tablet by mouth daily, Disp: , Rfl:     pantoprazole (PROTONIX) 40 mg tablet, TAKE 1 TABLET BY MOUTH DAILY BEFORE BREAKFAST, Disp: 90 tablet, Rfl: 1    tirzepatide (Mounjaro) 2.5 MG/0.5ML, Inject 0.5 mL (2.5 mg total) under the skin every 7 days, Disp: 2 mL, Rfl: 0    The following portions of the patient's history were reviewed and updated as appropriate: allergies, current medications, past family history, past medical history, past social history, past surgical history and problem list.    Review of Systems   Constitutional:  Negative for chills and fever.   HENT:  Negative for congestion, rhinorrhea and sore throat.    Eyes:  Negative for discharge, redness and itching.   Respiratory:  Negative for chest tightness, shortness of breath and  "wheezing.    Cardiovascular:  Negative for chest pain and palpitations.   Gastrointestinal:  Negative for abdominal pain, constipation and diarrhea.   Genitourinary:  Negative for dysuria.   Skin:  Negative for pallor and rash.   Neurological:  Negative for dizziness, weakness, numbness and headaches.         PHQ-2/9 Depression Screening    Little interest or pleasure in doing things: 0 - not at all  Feeling down, depressed, or hopeless: 0 - not at all  PHQ-2 Score: 0  PHQ-2 Interpretation: Negative depression screen             Objective:    /70 (BP Location: Left arm, Patient Position: Sitting, Cuff Size: Large)   Pulse 102   Temp 98.4 °F (36.9 °C) (Tympanic)   Resp 18   Ht 5' 3\" (1.6 m)   Wt 132 kg (290 lb 3.2 oz)   SpO2 94%   BMI 51.41 kg/m²      Physical Exam  Vitals and nursing note reviewed.   Constitutional:       Appearance: Normal appearance. He is obese.   HENT:      Right Ear: External ear normal.      Left Ear: External ear normal.      Mouth/Throat:      Mouth: Mucous membranes are moist.      Pharynx: No oropharyngeal exudate.   Eyes:      General:         Right eye: No discharge.         Left eye: No discharge.      Conjunctiva/sclera: Conjunctivae normal.   Cardiovascular:      Rate and Rhythm: Normal rate and regular rhythm.      Pulses: no weak pulses.           Dorsalis pedis pulses are 2+ on the right side and 2+ on the left side.      Heart sounds: No murmur heard.  Pulmonary:      Effort: Pulmonary effort is normal.      Breath sounds: Normal breath sounds. No wheezing.   Abdominal:      General: There is no distension.      Palpations: Abdomen is soft.      Tenderness: There is no abdominal tenderness.   Musculoskeletal:         General: Tenderness (bilateral knee) present.      Right lower leg: No edema.      Left lower leg: No edema.   Feet:      Right foot:      Skin integrity: No ulcer, skin breakdown, erythema, warmth, callus or dry skin.      Left foot:      Skin integrity: " No ulcer, skin breakdown, erythema, warmth, callus or dry skin.   Skin:     Findings: No lesion or rash.   Neurological:      Mental Status: He is alert. Mental status is at baseline.   Psychiatric:         Mood and Affect: Mood normal.         Thought Content: Thought content normal.       Patient's shoes and socks removed.    Right Foot/Ankle   Right Foot Inspection  Skin Exam: skin normal. Skin not intact, no dry skin, no warmth, no callus, no erythema, no maceration, no abnormal color, no pre-ulcer, no ulcer and no callus.     Toe Exam: ROM and strength within normal limits.     Sensory   Monofilament testing: intact    Vascular  Capillary refills: < 3 seconds  The right DP pulse is 2+.     Right Toe  - Comprehensive Exam  Ecchymosis: none  Swelling: none   Tenderness: none       Left Foot/Ankle  Left Foot Inspection  Skin Exam: skin normal. Skin not intact, no dry skin, no warmth, no erythema, no maceration, normal color, no pre-ulcer, no ulcer and no callus.     Toe Exam: ROM and strength within normal limits.     Sensory   Monofilament testing: intact    Vascular  Capillary refills: < 3 seconds  The left DP pulse is 2+.     Left Toe  - Comprehensive Exam  Ecchymosis: none  Swelling: none   Tenderness: none       Assign Risk Category  No deformity present  No loss of protective sensation  No weak pulses  Risk: 0        Recent Results (from the past 8736 hour(s))   Lipid panel    Collection Time: 08/22/23 10:20 AM   Result Value Ref Range    Cholesterol 212 (H) See Comment mg/dL    Triglycerides 202 (H) See Comment mg/dL    HDL, Direct 43 >=40 mg/dL    LDL Calculated 129 (H) 0 - 100 mg/dL    Non-HDL-Chol (CHOL-HDL) 169 mg/dl   Hemoglobin A1C    Collection Time: 08/22/23 10:20 AM   Result Value Ref Range    Hemoglobin A1C 7.4 (H) Normal 4.0-5.6%; PreDiabetic 5.7-6.4%; Diabetic >=6.5%; Glycemic control for adults with diabetes <7.0% %     mg/dl   Comprehensive metabolic panel    Collection Time: 08/22/23  10:20 AM   Result Value Ref Range    Sodium 137 135 - 147 mmol/L    Potassium 4.4 3.5 - 5.3 mmol/L    Chloride 102 96 - 108 mmol/L    CO2 27 21 - 32 mmol/L    ANION GAP 8 mmol/L    BUN 14 5 - 25 mg/dL    Creatinine 1.05 0.60 - 1.30 mg/dL    Glucose, Fasting 159 (H) 65 - 99 mg/dL    Calcium 9.6 8.4 - 10.2 mg/dL    AST 17 13 - 39 U/L    ALT 28 7 - 52 U/L    Alkaline Phosphatase 77 34 - 104 U/L    Total Protein 7.5 6.4 - 8.4 g/dL    Albumin 4.5 3.5 - 5.0 g/dL    Total Bilirubin 0.57 0.20 - 1.00 mg/dL    eGFR 77 ml/min/1.73sq m   UA w Reflex to Microscopic w Reflex to Culture    Collection Time: 01/03/24  7:30 AM    Specimen: Urine   Result Value Ref Range    Color, UA Light Yellow     Clarity, UA Clear     Specific Gravity, UA 1.014 1.003 - 1.030    pH, UA 5.5 4.5, 5.0, 5.5, 6.0, 6.5, 7.0, 7.5, 8.0    Leukocytes, UA Negative Negative    Nitrite, UA Negative Negative    Protein, UA Negative Negative mg/dl    Glucose, UA Negative Negative mg/dl    Ketones, UA Negative Negative mg/dl    Urobilinogen, UA <2.0 <2.0 mg/dl mg/dl    Bilirubin, UA Negative Negative    Occult Blood, UA Negative Negative   Albumin / creatinine urine ratio    Collection Time: 01/03/24  7:30 AM   Result Value Ref Range    Creatinine, Ur 114.3 Reference range not established. mg/dL    Albumin,U,Random 11.7 <20.0 mg/L    Albumin Creat Ratio 10 0 - 30 mg/g creatinine   CBC    Collection Time: 01/03/24  7:30 AM   Result Value Ref Range    WBC 6.71 4.31 - 10.16 Thousand/uL    RBC 5.62 3.88 - 5.62 Million/uL    Hemoglobin 16.2 12.0 - 17.0 g/dL    Hematocrit 48.4 36.5 - 49.3 %    MCV 86 82 - 98 fL    MCH 28.8 26.8 - 34.3 pg    MCHC 33.5 31.4 - 37.4 g/dL    RDW 13.2 11.6 - 15.1 %    Platelets 202 149 - 390 Thousands/uL    MPV 9.8 8.9 - 12.7 fL   Comprehensive metabolic panel    Collection Time: 01/03/24  7:30 AM   Result Value Ref Range    Sodium 137 135 - 147 mmol/L    Potassium 4.2 3.5 - 5.3 mmol/L    Chloride 100 96 - 108 mmol/L    CO2 28 21 - 32 mmol/L     ANION GAP 9 mmol/L    BUN 16 5 - 25 mg/dL    Creatinine 1.07 0.60 - 1.30 mg/dL    Glucose, Fasting 142 (H) 65 - 99 mg/dL    Calcium 9.8 8.4 - 10.2 mg/dL    AST 16 13 - 39 U/L    ALT 32 7 - 52 U/L    Alkaline Phosphatase 80 34 - 104 U/L    Total Protein 7.6 6.4 - 8.4 g/dL    Albumin 4.5 3.5 - 5.0 g/dL    Total Bilirubin 0.59 0.20 - 1.00 mg/dL    eGFR 76 ml/min/1.73sq m   Lipid panel    Collection Time: 01/03/24  7:30 AM   Result Value Ref Range    Cholesterol 227 (H) See Comment mg/dL    Triglycerides 269 (H) See Comment mg/dL    HDL, Direct 43 >=40 mg/dL    LDL Calculated 130 (H) 0 - 100 mg/dL    Non-HDL-Chol (CHOL-HDL) 184 mg/dl   TSH, 3rd generation    Collection Time: 01/03/24  7:30 AM   Result Value Ref Range    TSH 3RD GENERATON 3.394 0.450 - 4.500 uIU/mL   Vitamin D 25 hydroxy    Collection Time: 01/03/24  7:30 AM   Result Value Ref Range    Vit D, 25-Hydroxy 39.0 30.0 - 100.0 ng/mL   Hemoglobin A1C    Collection Time: 01/03/24  7:30 AM   Result Value Ref Range    Hemoglobin A1C 7.4 (H) Normal 4.0-5.6%; PreDiabetic 5.7-6.4%; Diabetic >=6.5%; Glycemic control for adults with diabetes <7.0% %     mg/dl   PSA, Total Screen    Collection Time: 01/03/24  7:30 AM   Result Value Ref Range    PSA 1.74 0.00 - 4.00 ng/mL   CBC and differential    Collection Time: 07/02/24  9:03 AM   Result Value Ref Range    WBC 6.41 4.31 - 10.16 Thousand/uL    RBC 5.67 (H) 3.88 - 5.62 Million/uL    Hemoglobin 16.7 12.0 - 17.0 g/dL    Hematocrit 48.7 36.5 - 49.3 %    MCV 86 82 - 98 fL    MCH 29.5 26.8 - 34.3 pg    MCHC 34.3 31.4 - 37.4 g/dL    RDW 13.2 11.6 - 15.1 %    MPV 10.0 8.9 - 12.7 fL    Platelets 203 149 - 390 Thousands/uL    nRBC 0 /100 WBCs    Segmented % 70 43 - 75 %    Immature Grans % 1 0 - 2 %    Lymphocytes % 17 14 - 44 %    Monocytes % 8 4 - 12 %    Eosinophils Relative 3 0 - 6 %    Basophils Relative 1 0 - 1 %    Absolute Neutrophils 4.50 1.85 - 7.62 Thousands/µL    Absolute Immature Grans 0.06 0.00 - 0.20  Thousand/uL    Absolute Lymphocytes 1.09 0.60 - 4.47 Thousands/µL    Absolute Monocytes 0.50 0.17 - 1.22 Thousand/µL    Eosinophils Absolute 0.20 0.00 - 0.61 Thousand/µL    Basophils Absolute 0.06 0.00 - 0.10 Thousands/µL   Comprehensive metabolic panel    Collection Time: 07/02/24  9:03 AM   Result Value Ref Range    Sodium 138 135 - 147 mmol/L    Potassium 4.4 3.5 - 5.3 mmol/L    Chloride 103 96 - 108 mmol/L    CO2 27 21 - 32 mmol/L    ANION GAP 8 4 - 13 mmol/L    BUN 16 5 - 25 mg/dL    Creatinine 1.06 0.60 - 1.30 mg/dL    Glucose, Fasting 150 (H) 65 - 99 mg/dL    Calcium 9.6 8.4 - 10.2 mg/dL    AST 20 13 - 39 U/L    ALT 32 7 - 52 U/L    Alkaline Phosphatase 72 34 - 104 U/L    Total Protein 7.5 6.4 - 8.4 g/dL    Albumin 4.6 3.5 - 5.0 g/dL    Total Bilirubin 0.72 0.20 - 1.00 mg/dL    eGFR 76 ml/min/1.73sq m   Lipid panel    Collection Time: 07/02/24  9:03 AM   Result Value Ref Range    Cholesterol 224 (H) See Comment mg/dL    Triglycerides 256 (H) See Comment mg/dL    HDL, Direct 43 >=40 mg/dL    LDL Calculated 130 (H) 0 - 100 mg/dL    Non-HDL-Chol (CHOL-HDL) 181 mg/dl       Laboratory Results: I have personally reviewed the pertinent laboratory results/reports     Radiology/Other Diagnostic Testing Results: I have personally reviewed pertinent reports.      CT abdomen pelvis w contrast    Result Date: 1/3/2024  CT ABDOMEN AND PELVIS WITH IV CONTRAST INDICATION:   R10.11: Right upper quadrant pain. COMPARISON: CT scan from 3/2/2021. TECHNIQUE:  CT examination of the abdomen and pelvis was performed. Multiplanar 2D reformatted images were created from the source data. This examination, like all CT scans performed in the [facility], was performed utilizing techniques to minimize radiation dose exposure, including the use of iterative reconstruction and automated exposure control. Radiation dose length product (DLP) for this visit:  1733.35 mGy-cm IV Contrast:  100 mL of iohexol (OMNIPAQUE) Enteric  Contrast: Enteric contrast was administered. FINDINGS: ABDOMEN LOWER CHEST: Chronic right middle lobe atelectasis with endobronchial obstruction again noted. No pericardial or pleural effusion. LIVER/BILIARY TREE: Liver is enlarged and diffusely decreased in density consistent with fatty change. Stable hypodense lesion in the lateral right lobe measuring 9 mm likely a cyst. No CT evidence of suspicious hepatic mass.  Normal hepatic contours.  No biliary dilatation. GALLBLADDER:  No calcified gallstones. No pericholecystic inflammatory change. SPLEEN:  Unremarkable. PANCREAS:  Unremarkable. ADRENAL GLANDS:  Unremarkable. KIDNEYS/URETERS:  Unremarkable. No hydronephrosis. STOMACH AND BOWEL: There is colonic diverticulosis without evidence of acute diverticulitis. No bowel obstruction. APPENDIX: A normal appendix was visualized. ABDOMINOPELVIC CAVITY:  No ascites.  No pneumoperitoneum.  No lymphadenopathy. VESSELS: Atherosclerotic changes are present.  No evidence of aneurysm. PELVIS REPRODUCTIVE ORGANS: The prostate is enlarged. URINARY BLADDER:  Unremarkable. ABDOMINAL WALL/INGUINAL REGIONS: Fat-containing right inguinal hernia without induration unchanged. OSSEOUS STRUCTURES:  No acute fracture or destructive osseous lesion.     No acute inflammatory process identified. Chronic right middle lobe atelectasis. Enlarged fatty liver with small cyst in the right lobe unchanged. Colonic diverticulosis without diverticulitis. Workstation performed: [id number]        Assessment/Plan:  1. Primary osteoarthritis of both knees  2. Mixed hyperlipidemia  -     Lipid panel; Future  3. Type 2 diabetes mellitus without complication, without long-term current use of insulin (HCC)  -     Hemoglobin A1C; Future  -     CBC and differential; Future  -     Comprehensive metabolic panel; Future  -     tirzepatide (Mounjaro) 2.5 MG/0.5ML; Inject 0.5 mL (2.5 mg total) under the skin every 7 days  -     Continuous Glucose  (FreeStyle  "Mina 3 [place]) [place]; Use 1 each 1 (one) time for 1 dose  -     Continuous Glucose Sensor (FreeStyle Mina 3 Sensor) MISC; Use 1 each every 14 (fourteen) days  4. Encounter for immunization  -     Zoster Vaccine Recombinant IM        After a long conversation agreeable to starting Mounjaro, will help with tighter diabetes control and weight loss.       I have discussed side effects of GLP-1 analogue(tMounjaro)- commonly including nausea, vomiting,dyspepsia, tachycardia,abdominal pain,decreased appetite, UTIs,rare but more serious side effects- diabetic retinopathy, acute kidney injury, medullary thyroid cancer, thyroid c cell tumors, anaphylaxis, angioedema, chronic renal failure, pancreatitis, cholecystitis,cholelithiasis.    Check labs as above  Continue atorvastatin  Shingrix #1 today  Diabetic diet  Check feet daily  Annual diabetic eye exam           Read package inserts for all medications before starting a new medications, call me if you have any questions.    Patient was given opportunity to ask questions and all questions were answered.    Disclaimer: Portions of the record may have been created with voice recognition software. Occasional wrong word or \"sound a like\" substitutions may have occurred due to the inherent limitations of voice recognition software. Read the chart carefully and recognize, using context, where substitutions have occurred. I have used the Epic copy/forward function to compose this note. I have reviewed my current note to ensure it reflects the current patient status, exam, assessment and plan.    "

## 2024-07-02 ENCOUNTER — APPOINTMENT (OUTPATIENT)
Dept: LAB | Facility: CLINIC | Age: 59
End: 2024-07-02
Payer: COMMERCIAL

## 2024-07-02 DIAGNOSIS — E11.9 TYPE 2 DIABETES MELLITUS WITHOUT COMPLICATION, WITHOUT LONG-TERM CURRENT USE OF INSULIN (HCC): ICD-10-CM

## 2024-07-02 DIAGNOSIS — E78.2 MIXED HYPERLIPIDEMIA: ICD-10-CM

## 2024-07-02 DIAGNOSIS — E78.5 HYPERLIPIDEMIA, UNSPECIFIED HYPERLIPIDEMIA TYPE: ICD-10-CM

## 2024-07-02 LAB
ALBUMIN SERPL BCG-MCNC: 4.6 G/DL (ref 3.5–5)
ALP SERPL-CCNC: 72 U/L (ref 34–104)
ALT SERPL W P-5'-P-CCNC: 32 U/L (ref 7–52)
ANION GAP SERPL CALCULATED.3IONS-SCNC: 8 MMOL/L (ref 4–13)
AST SERPL W P-5'-P-CCNC: 20 U/L (ref 13–39)
BASOPHILS # BLD AUTO: 0.06 THOUSANDS/ÂΜL (ref 0–0.1)
BASOPHILS NFR BLD AUTO: 1 % (ref 0–1)
BILIRUB SERPL-MCNC: 0.72 MG/DL (ref 0.2–1)
BUN SERPL-MCNC: 16 MG/DL (ref 5–25)
CALCIUM SERPL-MCNC: 9.6 MG/DL (ref 8.4–10.2)
CHLORIDE SERPL-SCNC: 103 MMOL/L (ref 96–108)
CHOLEST SERPL-MCNC: 224 MG/DL
CO2 SERPL-SCNC: 27 MMOL/L (ref 21–32)
CREAT SERPL-MCNC: 1.06 MG/DL (ref 0.6–1.3)
EOSINOPHIL # BLD AUTO: 0.2 THOUSAND/ÂΜL (ref 0–0.61)
EOSINOPHIL NFR BLD AUTO: 3 % (ref 0–6)
ERYTHROCYTE [DISTWIDTH] IN BLOOD BY AUTOMATED COUNT: 13.2 % (ref 11.6–15.1)
EST. AVERAGE GLUCOSE BLD GHB EST-MCNC: 157 MG/DL
GFR SERPL CREATININE-BSD FRML MDRD: 76 ML/MIN/1.73SQ M
GLUCOSE P FAST SERPL-MCNC: 150 MG/DL (ref 65–99)
HBA1C MFR BLD: 7.1 %
HCT VFR BLD AUTO: 48.7 % (ref 36.5–49.3)
HDLC SERPL-MCNC: 43 MG/DL
HGB BLD-MCNC: 16.7 G/DL (ref 12–17)
IMM GRANULOCYTES # BLD AUTO: 0.06 THOUSAND/UL (ref 0–0.2)
IMM GRANULOCYTES NFR BLD AUTO: 1 % (ref 0–2)
LDLC SERPL CALC-MCNC: 130 MG/DL (ref 0–100)
LYMPHOCYTES # BLD AUTO: 1.09 THOUSANDS/ÂΜL (ref 0.6–4.47)
LYMPHOCYTES NFR BLD AUTO: 17 % (ref 14–44)
MCH RBC QN AUTO: 29.5 PG (ref 26.8–34.3)
MCHC RBC AUTO-ENTMCNC: 34.3 G/DL (ref 31.4–37.4)
MCV RBC AUTO: 86 FL (ref 82–98)
MONOCYTES # BLD AUTO: 0.5 THOUSAND/ÂΜL (ref 0.17–1.22)
MONOCYTES NFR BLD AUTO: 8 % (ref 4–12)
NEUTROPHILS # BLD AUTO: 4.5 THOUSANDS/ÂΜL (ref 1.85–7.62)
NEUTS SEG NFR BLD AUTO: 70 % (ref 43–75)
NONHDLC SERPL-MCNC: 181 MG/DL
NRBC BLD AUTO-RTO: 0 /100 WBCS
PLATELET # BLD AUTO: 203 THOUSANDS/UL (ref 149–390)
PMV BLD AUTO: 10 FL (ref 8.9–12.7)
POTASSIUM SERPL-SCNC: 4.4 MMOL/L (ref 3.5–5.3)
PROT SERPL-MCNC: 7.5 G/DL (ref 6.4–8.4)
RBC # BLD AUTO: 5.67 MILLION/UL (ref 3.88–5.62)
SODIUM SERPL-SCNC: 138 MMOL/L (ref 135–147)
TRIGL SERPL-MCNC: 256 MG/DL
WBC # BLD AUTO: 6.41 THOUSAND/UL (ref 4.31–10.16)

## 2024-07-02 PROCEDURE — 83036 HEMOGLOBIN GLYCOSYLATED A1C: CPT

## 2024-07-02 PROCEDURE — 36415 COLL VENOUS BLD VENIPUNCTURE: CPT

## 2024-07-02 PROCEDURE — 80061 LIPID PANEL: CPT

## 2024-07-02 PROCEDURE — 85025 COMPLETE CBC W/AUTO DIFF WBC: CPT

## 2024-07-02 PROCEDURE — 80053 COMPREHEN METABOLIC PANEL: CPT

## 2024-07-02 RX ORDER — KETOROLAC TROMETHAMINE 30 MG/ML
1 INJECTION, SOLUTION INTRAMUSCULAR; INTRAVENOUS ONCE
Qty: 1 EACH | Refills: 0 | Status: SHIPPED | OUTPATIENT
Start: 2024-07-02 | End: 2024-07-02

## 2024-07-02 RX ORDER — ATORVASTATIN CALCIUM 80 MG/1
80 TABLET, FILM COATED ORAL DAILY
Qty: 90 TABLET | Refills: 1 | Status: SHIPPED | OUTPATIENT
Start: 2024-07-02

## 2024-07-02 RX ORDER — BLOOD-GLUCOSE SENSOR
1 EACH MISCELLANEOUS
Qty: 6 EACH | Refills: 3 | Status: SHIPPED | OUTPATIENT
Start: 2024-07-02

## 2024-07-08 ENCOUNTER — TELEPHONE (OUTPATIENT)
Age: 59
End: 2024-07-08

## 2024-07-08 NOTE — TELEPHONE ENCOUNTER
PA for MOUNJARO 2.5 MG/0.5 ML SUBMITTED     Submitted via    [][employer]-KEY   [x][employer]-Case ID # [id number]   []Faxed to plan   []Other website   []Phone call Case ID #     Office notes sent, clinical questions answered. Awaiting determination    Turnaround time for your insurance to make a decision on your Prior Authorization can take 7-21 business days.

## 2024-07-09 ENCOUNTER — TELEPHONE (OUTPATIENT)
Age: 59
End: 2024-07-09

## 2024-07-09 DIAGNOSIS — E11.9 TYPE 2 DIABETES MELLITUS WITHOUT COMPLICATION, WITHOUT LONG-TERM CURRENT USE OF INSULIN (HCC): Primary | ICD-10-CM

## 2024-07-09 NOTE — TELEPHONE ENCOUNTER
PA for Continuous Glucose Sensor (FreeStyle Mina 3 Sensor)     Submitted via    [][employer]-KEY   [x][employer]-Case ID # [id number]   []Faxed to plan   []Other website   []Phone call Case ID #     Office notes sent, clinical questions answered. Awaiting determination    Turnaround time for your insurance to make a decision on your Prior Authorization can take 7-21 business days.

## 2024-07-09 NOTE — TELEPHONE ENCOUNTER
PA for Mounjaro Approved     Date(s) approved 7-8-2024 - 7-8-2025        Patient advised by          [x] [employer] Message  [] Phone call   []LMOM  []L/M to call office as no active Communication consent on file  []Unable to leave detailed message as VM not approved on Communication consent       Pharmacy advised by    [x]Fax  []Phone call    Approval letter scanned into Media Yes

## 2024-07-12 NOTE — TELEPHONE ENCOUNTER
PA for FreeStyle Mina 3 Sensor Denied    Reason:        Message sent to office clinical pool Yes    Denial letter scanned into Media Yes    Appeal started No     **Please follow up with your patient regarding denial and next steps**

## 2024-07-15 RX ORDER — BLOOD SUGAR DIAGNOSTIC
STRIP MISCELLANEOUS
Qty: 300 EACH | Refills: 3 | Status: SHIPPED | OUTPATIENT
Start: 2024-07-15

## 2024-07-15 RX ORDER — LANCETS 33 GAUGE
EACH MISCELLANEOUS
Qty: 300 EACH | Refills: 3 | Status: SHIPPED | OUTPATIENT
Start: 2024-07-15

## 2024-07-15 RX ORDER — BLOOD-GLUCOSE METER
KIT MISCELLANEOUS
Qty: 1 KIT | Refills: 0 | Status: SHIPPED | OUTPATIENT
Start: 2024-07-15

## 2024-08-08 ENCOUNTER — NURSE TRIAGE (OUTPATIENT)
Age: 59
End: 2024-08-08

## 2024-08-08 NOTE — TELEPHONE ENCOUNTER
"Regarding: Chest tightness / SOB  ----- Message from Kassandra BROWN sent at 8/8/2024  9:03 AM EDT -----  Farhad called to re-schedule his appointment for today. Appointment notes show \"chest tightness and SOB\" patient was never triaged at time of scheduling yesterday. Was in the process of going over patients symptoms and patient hung up - please call patient to triage Chest tightness and SOB.   Thank you!    "

## 2024-08-08 NOTE — TELEPHONE ENCOUNTER
Patient was called back to triage for chest tightness and sob. Patient stated that he feels fine and didn't want to be triaged. 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 Hx of severe covid with hospitalization. patient is requesting a referral to pulmonology, referral still active. Provided patient info on nearest pulmonologist. No further concerns.    Reason for Disposition  • Information only question and nurse able to answer    Protocols used: Information Only Call - No Triage-ADULT-[place]

## 2024-08-12 ENCOUNTER — OFFICE VISIT (OUTPATIENT)
Dept: FAMILY MEDICINE CLINIC | Facility: CLINIC | Age: 59
End: 2024-08-12
Payer: COMMERCIAL

## 2024-08-12 VITALS
WEIGHT: 292 LBS | RESPIRATION RATE: 20 BRPM | HEART RATE: 91 BPM | DIASTOLIC BLOOD PRESSURE: 72 MMHG | BODY MASS INDEX: 51.74 KG/M2 | OXYGEN SATURATION: 95 % | HEIGHT: 63 IN | SYSTOLIC BLOOD PRESSURE: 142 MMHG | TEMPERATURE: 98.2 F

## 2024-08-12 DIAGNOSIS — K21.9 GASTROESOPHAGEAL REFLUX DISEASE WITHOUT ESOPHAGITIS: ICD-10-CM

## 2024-08-12 DIAGNOSIS — E66.01 CLASS 3 SEVERE OBESITY DUE TO EXCESS CALORIES WITHOUT SERIOUS COMORBIDITY WITH BODY MASS INDEX (BMI) OF 50.0 TO 59.9 IN ADULT (HCC): ICD-10-CM

## 2024-08-12 DIAGNOSIS — Z00.01 ENCOUNTER FOR GENERAL ADULT MEDICAL EXAMINATION WITH ABNORMAL FINDINGS: Primary | ICD-10-CM

## 2024-08-12 DIAGNOSIS — G47.33 OSA ON CPAP: ICD-10-CM

## 2024-08-12 DIAGNOSIS — E11.9 TYPE 2 DIABETES MELLITUS WITHOUT COMPLICATION, WITHOUT LONG-TERM CURRENT USE OF INSULIN (HCC): ICD-10-CM

## 2024-08-12 PROCEDURE — 99396 PREV VISIT EST AGE 40-64: CPT | Performed by: FAMILY MEDICINE

## 2024-08-12 PROCEDURE — 99214 OFFICE O/P EST MOD 30 MIN: CPT | Performed by: FAMILY MEDICINE

## 2024-08-12 NOTE — PROGRESS NOTES
Adult Annual Physical  Name: Farhad Montaño      : 1965      MRN: [id number]  Encounter Provider: Maricruz Alfonso MD  Encounter Date: 2024   Encounter department: [facility]    Assessment & Plan   1. Encounter for general adult medical examination with abnormal findings  2. Type 2 diabetes mellitus without complication, without long-term current use of insulin (HCC)  -     Comprehensive metabolic panel; Future  -     Hemoglobin A1C; Future  3. Class 3 severe obesity due to excess calories without serious comorbidity with body mass index (BMI) of 50.0 to 59.9 in adult ([facility])  4. EULALIO on CPAP  -     Ambulatory Referral to Sleep Medicine; Future  5. Gastroesophageal reflux disease without esophagitis  Reviewed and discussed A1c 7.1 CMP is within normal limits.    Continue Lexapro.  Continue atorvastatin.  Albuterol as needed for wheezing or shortness of breath .  continue pantoprazole for reflux  Immunizations and preventive care screenings were discussed with patient today. Appropriate education was printed on patient's after visit summary.    Encouraged to start Mounjaro.  He had multiple questions about Mounjaro which I have discussed again with him.  He is aware of the side effect profile.       I have discussed side effects of GLP-1 analogue(trulicity/ozempic/rebysus/mounjaro)- commonly including nausea, vomiting,dyspepsia, tachycardia,abdominal pain,decreased appetite, UTIs,rare but more serious side effects- diabetic retinopathy, acute kidney injury, medullary thyroid cancer, thyroid c cell tumors, anaphylaxis, angioedema, chronic renal failure, pancreatitis, cholecystitis,cholelithiasis.    Some of the symptoms are related to obesity hypoventilation syndrome he does have chronic sleep apnea and is using his CPAP but this may be the reason why he is short of breath and wheezing while sitting upright or laying flat.  I have advised him to follow-up with Dr. Comer for  this.    Counseling:  Alcohol/drug use: discussed moderation in alcohol intake, the recommendations for healthy alcohol use, and avoidance of illicit drug use.  Dental Health: discussed importance of regular tooth brushing, flossing, and dental visits.  Injury prevention: discussed safety/seat belts, safety helmets, smoke detectors, carbon dioxide detectors, and smoking near bedding or upholstery.  Sexual health: discussed sexually transmitted diseases, partner selection, use of condoms, avoidance of unintended pregnancy, and contraceptive alternatives.  Exercise: the importance of regular exercise/physical activity was discussed. Recommend exercise 3-5 times per week for at least 30 minutes.       Depression Screening and Follow-up Plan: Patient was screened for depression during today's encounter. They screened negative with a PHQ-2 score of 0.        History of Present Illness     Adult Annual Physical:  Patient presents for annual physical. Type 2 diabetes- non compliant with meds, not started mounjaro, would like to cure it naturally  Hyperlipidemia- on atorvastatin  Anxiety- on lexapro  He has a history of COVID in 2021 and since not then he feels like he has occasional chest tightness or throat closing when he is sitting also feels pain and is very anxious.     .     Diet and Physical Activity:  - Diet/Nutrition: well balanced diet and consuming 3-5 servings of fruits/vegetables daily.  - Exercise: no formal exercise.    Depression Screening:  - PHQ-2 Score: 0    General Health:  - Sleep: snores loudly and daytime hypersomnolence. Uses CPAP  - Hearing: normal hearing bilateral ears.  - Vision: goes for regular eye exams.  - Dental: regular dental visits.    Review of Systems   Constitutional:  Negative for chills and fever.   HENT:  Negative for congestion, rhinorrhea and sore throat.    Eyes:  Negative for discharge, redness and itching.   Respiratory:  Positive for wheezing. Negative for chest tightness and  shortness of breath.    Cardiovascular:  Negative for chest pain and palpitations.   Gastrointestinal:  Negative for abdominal pain, constipation and diarrhea.   Genitourinary:  Negative for dysuria.   Skin:  Negative for pallor and rash.   Neurological:  Negative for dizziness, weakness, numbness and headaches.     Medical History Reviewed by provider this encounter:  Tobacco  Allergies  Meds  Problems  Med Hx  Surg Hx  Fam Hx       Current Outpatient Medications on File Prior to Visit   Medication Sig Dispense Refill    albuterol (Ventolin HFA) 90 mcg/act inhaler Inhale 2 puffs every 6 (six) hours as needed for wheezing or shortness of breath 18 g 1    Ascorbic Acid (vitamin C) 100 MG tablet Take 100 mg by mouth daily      atorvastatin (LIPITOR) 80 mg tablet Take 1 tablet (80 mg total) by mouth daily 90 tablet 1    Cholecalciferol (VITAMIN D PO) Take 10,000 Units by mouth every other day       escitalopram (LEXAPRO) 10 mg tablet Take 1 tablet (10 mg total) by mouth daily 90 tablet 3    Multiple Vitamins-Minerals (multivitamin with minerals) tablet Take 1 tablet by mouth daily      pantoprazole (PROTONIX) 40 mg tablet TAKE 1 TABLET BY MOUTH DAILY BEFORE BREAKFAST 90 tablet 1    Blood Glucose Monitoring Suppl (OneTouch Verio Reflect) w/Device KIT Check blood sugars three times daily. Please substitute with appropriate alternative as covered by patient's insurance. Dx: E11.65 1 kit 0    Continuous Glucose Sensor (FreeStyle Mina 3 Sensor) [facility] [place] 1 each every 14 (fourteen) days 6 each 3    glucose blood (OneTouch Verio) test strip Check blood sugars three times daily. Please substitute with appropriate alternative as covered by patient's insurance. Dx: E11.65 300 each 3    OneTouch Delica Lancets 33G MISC Check blood sugars three times daily. Please substitute with appropriate alternative as covered by patient's insurance. Dx: E11.65 300 each 3    tirzepatide (Mounjaro) 2.5 MG/0.5ML Inject 0.5 mL (2.5 mg  "total) under the skin every 7 days (Patient not taking: Reported on 8/12/2024) 2 mL 0     No current facility-administered medications on file prior to visit.      Social History     Tobacco Use    Smoking status: Never    Smokeless tobacco: Never   Vaping Use    Vaping status: Never Used   Substance and Sexual Activity    Alcohol use: No    Drug use: No    Sexual activity: Yes     Partners: Female     Birth control/protection: None       Objective     /72 (BP Location: Left arm, Patient Position: Sitting, Cuff Size: Large)   Pulse 91   Temp 98.2 °F (36.8 °C) (Tympanic)   Resp 20   Ht 5' 3\" (1.6 m)   Wt 132 kg (292 lb)   SpO2 95%   BMI 51.73 kg/m²     Physical Exam  Vitals and nursing note reviewed.   Constitutional:       General: He is not in acute distress.     Appearance: Normal appearance. He is well-developed. He is obese. He is not ill-appearing or toxic-appearing.   HENT:      Head: Normocephalic and atraumatic.      Right Ear: External ear normal.      Left Ear: External ear normal.      Nose: No rhinorrhea.      Mouth/Throat:      Mouth: Mucous membranes are not pale and not cyanotic.   Eyes:      General:         Right eye: No discharge.         Left eye: No discharge.      Comments: Visual inspection   Pulmonary:      Effort: Pulmonary effort is normal. No respiratory distress.   Abdominal:      Palpations: Abdomen is soft.      Tenderness: There is no abdominal tenderness.   Musculoskeletal:         General: No tenderness or signs of injury.      Cervical back: Normal range of motion.   Skin:     Coloration: Skin is not jaundiced or pale.   Neurological:      General: No focal deficit present.      Mental Status: Mental status is at baseline.   Psychiatric:         Behavior: Behavior normal.         Thought Content: Thought content normal.         "

## 2024-08-24 DIAGNOSIS — F41.9 ANXIETY: ICD-10-CM

## 2024-08-25 RX ORDER — ESCITALOPRAM OXALATE 10 MG/1
10 TABLET ORAL DAILY
Qty: 90 TABLET | Refills: 2 | Status: SHIPPED | OUTPATIENT
Start: 2024-08-25

## 2024-09-24 ENCOUNTER — RA CDI HCC (OUTPATIENT)
Dept: OTHER | Facility: HOSPITAL | Age: 59
End: 2024-09-24

## 2024-09-24 NOTE — PROGRESS NOTES
HCC coding opportunities          Chart Reviewed number of suggestions sent to Provider: 2  E11.22  E11.65     Patients Insurance        Commercial Insurance: [employer]

## 2024-11-23 DIAGNOSIS — K21.9 GASTROESOPHAGEAL REFLUX DISEASE, UNSPECIFIED WHETHER ESOPHAGITIS PRESENT: ICD-10-CM

## 2024-11-24 DIAGNOSIS — E78.5 HYPERLIPIDEMIA, UNSPECIFIED HYPERLIPIDEMIA TYPE: ICD-10-CM

## 2024-11-25 RX ORDER — PANTOPRAZOLE SODIUM 40 MG/1
40 TABLET, DELAYED RELEASE ORAL
Qty: 90 TABLET | Refills: 0 | Status: SHIPPED | OUTPATIENT
Start: 2024-11-25

## 2024-11-26 RX ORDER — ATORVASTATIN CALCIUM 10 MG/1
10 TABLET, FILM COATED ORAL DAILY
Qty: 90 TABLET | Refills: 1 | OUTPATIENT
Start: 2024-11-26

## 2024-12-04 ENCOUNTER — RA CDI HCC (OUTPATIENT)
Dept: OTHER | Facility: HOSPITAL | Age: 59
End: 2024-12-04

## 2024-12-04 PROBLEM — E66.813 CLASS 3 SEVERE OBESITY DUE TO EXCESS CALORIES WITHOUT SERIOUS COMORBIDITY WITH BODY MASS INDEX (BMI) OF 50.0 TO 59.9 IN ADULT (HCC): Status: ACTIVE | Noted: 2019-05-31

## 2024-12-04 PROBLEM — R73.03 PREDIABETES: Status: RESOLVED | Noted: 2021-03-04 | Resolved: 2024-12-04

## 2024-12-04 PROBLEM — E66.01 CLASS 3 SEVERE OBESITY DUE TO EXCESS CALORIES WITHOUT SERIOUS COMORBIDITY WITH BODY MASS INDEX (BMI) OF 50.0 TO 59.9 IN ADULT (HCC): Status: ACTIVE | Noted: 2019-05-31

## 2024-12-04 NOTE — PROGRESS NOTES
HCC coding opportunities          Chart Reviewed number of suggestions sent to Provider: 1     Patients Insurance        Commercial Insurance: [employer]     E11.65

## 2024-12-16 ENCOUNTER — RESULTS FOLLOW-UP (OUTPATIENT)
Dept: FAMILY MEDICINE CLINIC | Facility: CLINIC | Age: 59
End: 2024-12-16

## 2024-12-16 ENCOUNTER — OFFICE VISIT (OUTPATIENT)
Dept: FAMILY MEDICINE CLINIC | Facility: CLINIC | Age: 59
End: 2024-12-16
Payer: COMMERCIAL

## 2024-12-16 VITALS
TEMPERATURE: 97.6 F | SYSTOLIC BLOOD PRESSURE: 134 MMHG | HEIGHT: 63 IN | DIASTOLIC BLOOD PRESSURE: 74 MMHG | WEIGHT: 281.6 LBS | RESPIRATION RATE: 18 BRPM | BODY MASS INDEX: 49.89 KG/M2

## 2024-12-16 DIAGNOSIS — M65.341 TRIGGER RING FINGER OF RIGHT HAND: ICD-10-CM

## 2024-12-16 DIAGNOSIS — Z23 ENCOUNTER FOR IMMUNIZATION: ICD-10-CM

## 2024-12-16 DIAGNOSIS — F41.9 ANXIETY: ICD-10-CM

## 2024-12-16 DIAGNOSIS — M72.0 DUPUYTREN CONTRACTURE OF RIGHT HAND: ICD-10-CM

## 2024-12-16 DIAGNOSIS — M17.0 PRIMARY OSTEOARTHRITIS OF BOTH KNEES: ICD-10-CM

## 2024-12-16 DIAGNOSIS — E78.5 HYPERLIPIDEMIA, UNSPECIFIED HYPERLIPIDEMIA TYPE: ICD-10-CM

## 2024-12-16 DIAGNOSIS — D17.1 LIPOMA OF ABDOMINAL WALL: ICD-10-CM

## 2024-12-16 DIAGNOSIS — K21.9 GASTROESOPHAGEAL REFLUX DISEASE, UNSPECIFIED WHETHER ESOPHAGITIS PRESENT: ICD-10-CM

## 2024-12-16 DIAGNOSIS — N40.0 BPH WITHOUT URINARY OBSTRUCTION OR LOWER URINARY TRACT SYMPTOMS: Primary | ICD-10-CM

## 2024-12-16 DIAGNOSIS — E11.9 TYPE 2 DIABETES MELLITUS WITHOUT COMPLICATION, WITHOUT LONG-TERM CURRENT USE OF INSULIN (HCC): ICD-10-CM

## 2024-12-16 PROBLEM — Z12.11 SCREENING FOR COLON CANCER: Status: ACTIVE | Noted: 2019-06-13

## 2024-12-16 PROBLEM — D12.6 COLON ADENOMA: Status: ACTIVE | Noted: 2022-07-11

## 2024-12-16 LAB
LEFT EYE DIABETIC RETINOPATHY: NORMAL
LEFT EYE IMAGE QUALITY: NORMAL
LEFT EYE MACULAR EDEMA: NORMAL
LEFT EYE OTHER RETINOPATHY: NORMAL
RIGHT EYE DIABETIC RETINOPATHY: NORMAL
RIGHT EYE IMAGE QUALITY: NORMAL
RIGHT EYE MACULAR EDEMA: NORMAL
RIGHT EYE OTHER RETINOPATHY: NORMAL
SEVERITY (EYE EXAM): NORMAL

## 2024-12-16 PROCEDURE — 90471 IMMUNIZATION ADMIN: CPT | Performed by: FAMILY MEDICINE

## 2024-12-16 PROCEDURE — 90750 HZV VACC RECOMBINANT IM: CPT | Performed by: FAMILY MEDICINE

## 2024-12-16 PROCEDURE — 99214 OFFICE O/P EST MOD 30 MIN: CPT | Performed by: FAMILY MEDICINE

## 2024-12-16 RX ORDER — PANTOPRAZOLE SODIUM 40 MG/1
40 TABLET, DELAYED RELEASE ORAL
Qty: 90 TABLET | Refills: 0 | Status: SHIPPED | OUTPATIENT
Start: 2024-12-16

## 2024-12-16 RX ORDER — ATORVASTATIN CALCIUM 80 MG/1
80 TABLET, FILM COATED ORAL DAILY
Qty: 90 TABLET | Refills: 1 | Status: SHIPPED | OUTPATIENT
Start: 2024-12-16

## 2024-12-16 RX ORDER — ESCITALOPRAM OXALATE 10 MG/1
10 TABLET ORAL DAILY
Qty: 90 TABLET | Refills: 2 | Status: SHIPPED | OUTPATIENT
Start: 2024-12-16

## 2024-12-16 NOTE — PROGRESS NOTES
Subjective:      Patient ID: Farhad Montaño is a 59 y.o. male.    Type 2 diabetes- non compliant with meds, did not start Mounjaro, previously have prescribed Jardiance and metformin which she did not take.  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  He reports generalized abdominal pain sometimes right upper quadrant and sometimes left side.  He has a abdominal lipoma and is states that sometimes it hurts there.  He may have been constipated.  He also reports urinary frequency, is worried about his prostate.  States that there is incomplete emptying.  Family history of prostate cancer.  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    GERD (gastroesophageal reflux disease)     Joint pain in both hands 02/19/2021    Kidney stone     Kidney stones 02/19/2021    Mass of anterior abdominal wall 02/19/2021    LLQ 9x6 cm    MI (myocardial infarction) (HCC)     Morbid obesity with BMI of 50.0-59.9, adult (HCC)     Myocardial infarction (HCC) 06/11/2004    Need for hepatitis B vaccination 07/15/2021    Need for MMR vaccine 07/15/2021    Nonimmune to hepatitis B virus 03/04/2021    Obesity     Parotid gland enlargement 02/19/2021    left    Prediabetes 03/04/2021    Primary osteoarthritis of left knee 07/15/2021    RUQ pain 07/15/2021    Sarcoidosis 02/19/2021    Sleep apnea     cpap    Soft tissue mass  02/19/2021    R upper back 9x 11x3cm L upper back 14x20x 4 cm       Family History   Problem Relation Age of Onset    Heart disease Mother     Diabetes Mother     Heart disease Father     Cancer Father         blood cancer    Heart disease Paternal Aunt     Heart disease Paternal Uncle     Diabetes Maternal Grandfather     Stroke Neg Hx        Past Surgical History:   Procedure Laterality Date    BRONCHOSCOPY      COLONOSCOPY      EGD      HAND SURGERY Bilateral 2019    trigger finger and carpal tunnel     KNEE ARTHROSCOPY      meniscus surgery    [place] EXCISION TUMOR SOFT TIS BACK/FLANK SUBQ 3 CM/> N/A 4/8/2021    Procedure: excision large mass back subfascial  25x 13x 3;  Surgeon: Angel Hubbard MD;  Location:  MAIN OR;  Service: General    VASECTOMY          reports that he has never smoked. He has never used smokeless tobacco. He reports that he does not drink alcohol and does not use drugs.      Current Outpatient Medications:     albuterol (Ventolin HFA) 90 mcg/act inhaler, Inhale 2 puffs every 6 (six) hours as needed for wheezing or shortness of breath, Disp: 18 g, Rfl: 1    Ascorbic Acid (vitamin C) 100 MG tablet, Take 100 mg by mouth daily, Disp: , Rfl:     atorvastatin (LIPITOR) 80 mg tablet, Take 1 tablet (80 mg total) by mouth daily, Disp: 90 tablet, Rfl: 1    Cholecalciferol (VITAMIN D PO), Take 10,000 Units by mouth every other day , Disp: , Rfl:     Continuous Glucose Sensor (FreeStyle Mina 3 Sensor) MISC, Use 1 each every 14 (fourteen) days, Disp: 6 each, Rfl: 3    escitalopram (LEXAPRO) 10 mg tablet, Take 1 tablet (10 mg total) by mouth daily, Disp: 90 tablet, Rfl: 2    Multiple Vitamins-Minerals (multivitamin with minerals) tablet, Take 1 tablet by mouth daily, Disp: , Rfl:     pantoprazole (PROTONIX) 40 mg tablet, Take 1 tablet (40 mg total) by mouth daily before breakfast, Disp: 90 tablet, Rfl: 0    Blood Glucose Monitoring Suppl (OneTouch Verio Reflect) w/Device KIT, Check blood sugars three  "times daily. Please substitute with appropriate alternative as covered by patient's insurance. Dx: E11.65, Disp: 1 kit, Rfl: 0    glucose blood (OneTouch Verio) test strip, Check blood sugars three times daily. Please substitute with appropriate alternative as covered by patient's insurance. Dx: E11.65, Disp: 300 each, Rfl: 3    OneTouch Delica Lancets 33G MISC, Check blood sugars three times daily. Please substitute with appropriate alternative as covered by patient's insurance. Dx: E11.65, Disp: 300 each, Rfl: 3    The following portions of the patient's history were reviewed and updated as appropriate: allergies, current medications, past family history, past medical history, past social history, past surgical history and problem list.    Review of Systems   Constitutional:  Negative for chills and fever.   HENT:  Negative for congestion, rhinorrhea and sore throat.    Eyes:  Negative for discharge, redness and itching.   Respiratory:  Negative for chest tightness, shortness of breath and wheezing.    Cardiovascular:  Negative for chest pain and palpitations.   Gastrointestinal:  Negative for abdominal pain, constipation and diarrhea.   Genitourinary:  Negative for dysuria.   Musculoskeletal:  Positive for arthralgias.   Skin:  Negative for pallor and rash.   Neurological:  Negative for dizziness, weakness, numbness and headaches.         PHQ-2/9 Depression Screening    Little interest or pleasure in doing things: 0 - not at all  Feeling down, depressed, or hopeless: 0 - not at all  PHQ-2 Score: 0  PHQ-2 Interpretation: Negative depression screen             Objective:    /74 (BP Location: Right arm, Patient Position: Sitting, Cuff Size: Large)   Temp 97.6 °F (36.4 °C) (Tympanic Core)   Resp 18   Ht 5' 3\" (1.6 m)   Wt 128 kg (281 lb 9.6 oz)   BMI 49.88 kg/m²      Physical Exam  Vitals and nursing note reviewed.   Constitutional:       Appearance: Normal appearance. He is obese.   HENT:      Right Ear: " External ear normal.      Left Ear: External ear normal.   Eyes:      General:         Right eye: No discharge.         Left eye: No discharge.      Conjunctiva/sclera: Conjunctivae normal.   Cardiovascular:      Rate and Rhythm: Normal rate and regular rhythm.      Heart sounds: No murmur heard.  Pulmonary:      Effort: Pulmonary effort is normal.      Breath sounds: Normal breath sounds. No wheezing.   Abdominal:      General: There is no distension.      Palpations: Abdomen is soft.      Tenderness: There is abdominal tenderness in the right upper quadrant.      Comments: Lipoma of left lower abdominal wall   Musculoskeletal:         General: Deformity (nodule over the palmar surface of right 4th Metacarapal proximal end, locking of ring finger noted) present.      Right lower leg: No edema.      Left lower leg: No edema.   Skin:     Findings: No lesion or rash.   Neurological:      Mental Status: He is alert. Mental status is at baseline.   Psychiatric:         Mood and Affect: Mood normal.         Thought Content: Thought content normal.           Recent Results (from the past 52 weeks)   UA w Reflex to Microscopic w Reflex to Culture    Collection Time: 01/03/24  7:30 AM    Specimen: Urine   Result Value Ref Range    Color, UA Light Yellow     Clarity, UA Clear     Specific Gravity, UA 1.014 1.003 - 1.030    pH, UA 5.5 4.5, 5.0, 5.5, 6.0, 6.5, 7.0, 7.5, 8.0    Leukocytes, UA Negative Negative    Nitrite, UA Negative Negative    Protein, UA Negative Negative mg/dl    Glucose, UA Negative Negative mg/dl    Ketones, UA Negative Negative mg/dl    Urobilinogen, UA <2.0 <2.0 mg/dl mg/dl    Bilirubin, UA Negative Negative    Occult Blood, UA Negative Negative   Albumin / creatinine urine ratio    Collection Time: 01/03/24  7:30 AM   Result Value Ref Range    Creatinine, Ur 114.3 Reference range not established. mg/dL    Albumin,U,Random 11.7 <20.0 mg/L    Albumin Creat Ratio 10 0 - 30 mg/g creatinine   CBC     Collection Time: 01/03/24  7:30 AM   Result Value Ref Range    WBC 6.71 4.31 - 10.16 Thousand/uL    RBC 5.62 3.88 - 5.62 Million/uL    Hemoglobin 16.2 12.0 - 17.0 g/dL    Hematocrit 48.4 36.5 - 49.3 %    MCV 86 82 - 98 fL    MCH 28.8 26.8 - 34.3 pg    MCHC 33.5 31.4 - 37.4 g/dL    RDW 13.2 11.6 - 15.1 %    Platelets 202 149 - 390 Thousands/uL    MPV 9.8 8.9 - 12.7 fL   Comprehensive metabolic panel    Collection Time: 01/03/24  7:30 AM   Result Value Ref Range    Sodium 137 135 - 147 mmol/L    Potassium 4.2 3.5 - 5.3 mmol/L    Chloride 100 96 - 108 mmol/L    CO2 28 21 - 32 mmol/L    ANION GAP 9 mmol/L    BUN 16 5 - 25 mg/dL    Creatinine 1.07 0.60 - 1.30 mg/dL    Glucose, Fasting 142 (H) 65 - 99 mg/dL    Calcium 9.8 8.4 - 10.2 mg/dL    AST 16 13 - 39 U/L    ALT 32 7 - 52 U/L    Alkaline Phosphatase 80 34 - 104 U/L    Total Protein 7.6 6.4 - 8.4 g/dL    Albumin 4.5 3.5 - 5.0 g/dL    Total Bilirubin 0.59 0.20 - 1.00 mg/dL    eGFR 76 ml/min/1.73sq m   Lipid panel    Collection Time: 01/03/24  7:30 AM   Result Value Ref Range    Cholesterol 227 (H) See Comment mg/dL    Triglycerides 269 (H) See Comment mg/dL    HDL, Direct 43 >=40 mg/dL    LDL Calculated 130 (H) 0 - 100 mg/dL    Non-HDL-Chol (CHOL-HDL) 184 mg/dl   TSH, 3rd generation    Collection Time: 01/03/24  7:30 AM   Result Value Ref Range    TSH 3RD GENERATON 3.394 0.450 - 4.500 uIU/mL   Vitamin D 25 hydroxy    Collection Time: 01/03/24  7:30 AM   Result Value Ref Range    Vit D, 25-Hydroxy 39.0 30.0 - 100.0 ng/mL   Hemoglobin A1C    Collection Time: 01/03/24  7:30 AM   Result Value Ref Range    Hemoglobin A1C 7.4 (H) Normal 4.0-5.6%; PreDiabetic 5.7-6.4%; Diabetic >=6.5%; Glycemic control for adults with diabetes <7.0% %     mg/dl   PSA, Total Screen    Collection Time: 01/03/24  7:30 AM   Result Value Ref Range    PSA 1.74 0.00 - 4.00 ng/mL   Hemoglobin A1C    Collection Time: 07/02/24  9:03 AM   Result Value Ref Range    Hemoglobin A1C 7.1 (H) Normal  4.0-5.6%; PreDiabetic 5.7-6.4%; Diabetic >=6.5%; Glycemic control for adults with diabetes <7.0% %     mg/dl   CBC and differential    Collection Time: 07/02/24  9:03 AM   Result Value Ref Range    WBC 6.41 4.31 - 10.16 Thousand/uL    RBC 5.67 (H) 3.88 - 5.62 Million/uL    Hemoglobin 16.7 12.0 - 17.0 g/dL    Hematocrit 48.7 36.5 - 49.3 %    MCV 86 82 - 98 fL    MCH 29.5 26.8 - 34.3 pg    MCHC 34.3 31.4 - 37.4 g/dL    RDW 13.2 11.6 - 15.1 %    MPV 10.0 8.9 - 12.7 fL    Platelets 203 149 - 390 Thousands/uL    nRBC 0 /100 WBCs    Segmented % 70 43 - 75 %    Immature Grans % 1 0 - 2 %    Lymphocytes % 17 14 - 44 %    Monocytes % 8 4 - 12 %    Eosinophils Relative 3 0 - 6 %    Basophils Relative 1 0 - 1 %    Absolute Neutrophils 4.50 1.85 - 7.62 Thousands/µL    Absolute Immature Grans 0.06 0.00 - 0.20 Thousand/uL    Absolute Lymphocytes 1.09 0.60 - 4.47 Thousands/µL    Absolute Monocytes 0.50 0.17 - 1.22 Thousand/µL    Eosinophils Absolute 0.20 0.00 - 0.61 Thousand/µL    Basophils Absolute 0.06 0.00 - 0.10 Thousands/µL   Comprehensive metabolic panel    Collection Time: 07/02/24  9:03 AM   Result Value Ref Range    Sodium 138 135 - 147 mmol/L    Potassium 4.4 3.5 - 5.3 mmol/L    Chloride 103 96 - 108 mmol/L    CO2 27 21 - 32 mmol/L    ANION GAP 8 4 - 13 mmol/L    BUN 16 5 - 25 mg/dL    Creatinine 1.06 0.60 - 1.30 mg/dL    Glucose, Fasting 150 (H) 65 - 99 mg/dL    Calcium 9.6 8.4 - 10.2 mg/dL    AST 20 13 - 39 U/L    ALT 32 7 - 52 U/L    Alkaline Phosphatase 72 34 - 104 U/L    Total Protein 7.5 6.4 - 8.4 g/dL    Albumin 4.6 3.5 - 5.0 g/dL    Total Bilirubin 0.72 0.20 - 1.00 mg/dL    eGFR 76 ml/min/1.73sq m   Lipid panel    Collection Time: 07/02/24  9:03 AM   Result Value Ref Range    Cholesterol 224 (H) See Comment mg/dL    Triglycerides 256 (H) See Comment mg/dL    HDL, Direct 43 >=40 mg/dL    LDL Calculated 130 (H) 0 - 100 mg/dL    Non-HDL-Chol (CHOL-HDL) 181 mg/dl   IRIS Diabetic eye exam    Collection Time:  24 12:32 PM   Result Value Ref Range    Severity NORMAL     Right Eye Diabetic Retinopathy None     Right Eye Macular Edema None     Right Eye Other Retinopathy None     Right Eye Image Quality Gradable Image     Left Eye Diabetic Retinopathy None     Left Eye Macular Edema None     Left Eye Other Retinopathy None     Left Eye Image Quality Gradable Image     Result Retinal Study Result for LEIGHA SEVERINO     Result      Result       foreign SWANSON 58 y/o, M (: 1965, MRN: [id number])    Result       presented to [facility] on 2024 for a retinal imaging study of the left and right eyes.    Result      Result       Based on the findings of the study, the following is recommended for LEIGHA SEVERINO    Result       Normal Study: Re-scan the patient in 12 months or in the next calendar year.    Result      Result       Interpreting Provider's Comments:  No comments provided    Result       Diagnoses Present: E119 - Type 2 diabetes mellitus without complications    Result      Result       Right eye findings: Negative for Diabetic Retinopathy    Result Negative for Macular Edema     Result      Result       Left eye findings: Negative for Diabetic Retinopathy    Result Negative for Macular Edema     Result      Result       This result was electronically signed by Jostin Mayorga MD, NPI: [id number], Taxonomy: [id number] on 2024 17:49 [facility].    Result      Result       NOTE:  Any pathology noted on this diabetic retinal evaluation should be confirmed by an appropriate ophthalmic examination.       Laboratory Results: I have personally reviewed the pertinent laboratory results/reports     Radiology/Other Diagnostic Testing Results: I have reviewed the following imaging and agree with the interpretation below.    CT abdomen pelvis w contrast  Result Date: 1/3/2024  CT ABDOMEN AND PELVIS WITH IV CONTRAST INDICATION:   R10.11: Right upper quadrant pain. COMPARISON: CT  scan from 3/2/2021. TECHNIQUE:  CT examination of the abdomen and pelvis was performed. Multiplanar 2D reformatted images were created from the source data. This examination, like all CT scans performed in the [facility], was performed utilizing techniques to minimize radiation dose exposure, including the use of iterative reconstruction and automated exposure control. Radiation dose length product (DLP) for this visit:  1733.35 mGy-cm IV Contrast:  100 mL of iohexol (OMNIPAQUE) Enteric Contrast: Enteric contrast was administered. FINDINGS: ABDOMEN LOWER CHEST: Chronic right middle lobe atelectasis with endobronchial obstruction again noted. No pericardial or pleural effusion. LIVER/BILIARY TREE: Liver is enlarged and diffusely decreased in density consistent with fatty change. Stable hypodense lesion in the lateral right lobe measuring 9 mm likely a cyst. No CT evidence of suspicious hepatic mass.  Normal hepatic contours.  No biliary dilatation. GALLBLADDER:  No calcified gallstones. No pericholecystic inflammatory change. SPLEEN:  Unremarkable. PANCREAS:  Unremarkable. ADRENAL GLANDS:  Unremarkable. KIDNEYS/URETERS:  Unremarkable. No hydronephrosis. STOMACH AND BOWEL: There is colonic diverticulosis without evidence of acute diverticulitis. No bowel obstruction. APPENDIX: A normal appendix was visualized. ABDOMINOPELVIC CAVITY:  No ascites.  No pneumoperitoneum.  No lymphadenopathy. VESSELS: Atherosclerotic changes are present.  No evidence of aneurysm. PELVIS REPRODUCTIVE ORGANS: The prostate is enlarged. URINARY BLADDER:  Unremarkable. ABDOMINAL WALL/INGUINAL REGIONS: Fat-containing right inguinal hernia without induration unchanged. OSSEOUS STRUCTURES:  No acute fracture or destructive osseous lesion.     No acute inflammatory process identified. Chronic right middle lobe atelectasis. Enlarged fatty liver with small cyst in the right lobe unchanged. Colonic diverticulosis without  diverticulitis. Workstation performed: [id number]        Assessment/Plan:  1. BPH without urinary obstruction or lower urinary tract symptoms  -     PSA, total and free; Future  -     US kidney and bladder with pvr; Future; Expected date: 12/16/2024  2. Type 2 diabetes mellitus without complication, without long-term current use of insulin (HCC)  -     IRIS Diabetic eye exam  -     Comprehensive metabolic panel; Future  -     CBC and differential; Future  -     TSH, 3rd generation with Free T4 reflex; Future  -     Hemoglobin A1C; Future  -     Albumin / creatinine urine ratio; Future  -     Ambulatory Referral to Nutrition Services; Future  3. Encounter for immunization  -     Zoster Vaccine Recombinant IM  4. Lipoma of abdominal wall  -     US abdominal wall; Future; Expected date: 12/16/2024  5. Dupuytren contracture of right hand  6. Trigger ring finger of right hand  -     Ambulatory Referral to Urology; Future  7. Primary osteoarthritis of both knees  -     Ambulatory Referral to Orthopedic Surgery; Future  8. Anxiety  -     escitalopram (LEXAPRO) 10 mg tablet; Take 1 tablet (10 mg total) by mouth daily  9. Hyperlipidemia, unspecified hyperlipidemia type  -     atorvastatin (LIPITOR) 80 mg tablet; Take 1 tablet (80 mg total) by mouth daily  10. Gastroesophageal reflux disease, unspecified whether esophagitis present  -     pantoprazole (PROTONIX) 40 mg tablet; Take 1 tablet (40 mg total) by mouth daily before breakfast      Discussed to check labs including PSA.  Second Shingrix dose today.  Will obtain ultrasound of the abdominal wall to rule out any change in size of the lipoma.  Discussed this is not hernia and does not need any further intervention.   Will obtain ultrasound of the bladder with PVR to rule out urinary obstruction due to prostate enlargement.  Will refer to urology.  Obtain PSA.  Diabetic diet, low-cholesterol low-carb diet.  Discussed to eat smaller portions, continue 150 minutes of  "exercise per week  Follow up with ortho for knee and hand             Read package inserts for all medications before starting a new medications, call me if you have any questions.    Patient was given opportunity to ask questions and all questions were answered.    Disclaimer: Portions of the record may have been created with voice recognition software. Occasional wrong word or \"sound a like\" substitutions may have occurred due to the inherent limitations of voice recognition software. Read the chart carefully and recognize, using context, where substitutions have occurred. I have used the Epic copy/forward function to compose this note. I have reviewed my current note to ensure it reflects the current patient status, exam, assessment and plan.    "

## 2025-01-15 PROBLEM — Z12.11 SCREENING FOR COLON CANCER: Status: RESOLVED | Noted: 2019-06-13 | Resolved: 2025-01-15

## 2025-01-23 ENCOUNTER — OFFICE VISIT (OUTPATIENT)
Dept: OBGYN CLINIC | Facility: CLINIC | Age: 60
End: 2025-01-23
Payer: COMMERCIAL

## 2025-01-23 VITALS — BODY MASS INDEX: 49.79 KG/M2 | WEIGHT: 281 LBS | HEIGHT: 63 IN

## 2025-01-23 DIAGNOSIS — M72.0 DUPUYTREN CONTRACTURE OF RIGHT HAND: ICD-10-CM

## 2025-01-23 DIAGNOSIS — E11.9 TYPE 2 DIABETES MELLITUS WITHOUT COMPLICATION, WITHOUT LONG-TERM CURRENT USE OF INSULIN (HCC): ICD-10-CM

## 2025-01-23 DIAGNOSIS — M65.341 TRIGGER RING FINGER OF RIGHT HAND: Primary | ICD-10-CM

## 2025-01-23 PROCEDURE — 20550 NJX 1 TENDON SHEATH/LIGAMENT: CPT | Performed by: SURGERY

## 2025-01-23 PROCEDURE — 99213 OFFICE O/P EST LOW 20 MIN: CPT | Performed by: SURGERY

## 2025-01-23 RX ORDER — DEXAMETHASONE SODIUM PHOSPHATE 10 MG/ML
40 INJECTION, SOLUTION INTRAMUSCULAR; INTRAVENOUS
Status: COMPLETED | OUTPATIENT
Start: 2025-01-23 | End: 2025-01-23

## 2025-01-23 RX ORDER — LIDOCAINE HYDROCHLORIDE 10 MG/ML
1 INJECTION, SOLUTION INFILTRATION; PERINEURAL
Status: COMPLETED | OUTPATIENT
Start: 2025-01-23 | End: 2025-01-23

## 2025-01-23 RX ADMIN — LIDOCAINE HYDROCHLORIDE 1 ML: 10 INJECTION, SOLUTION INFILTRATION; PERINEURAL at 09:45

## 2025-01-23 RX ADMIN — DEXAMETHASONE SODIUM PHOSPHATE 40 MG: 10 INJECTION, SOLUTION INTRAMUSCULAR; INTRAVENOUS at 09:45

## 2025-01-23 NOTE — PROGRESS NOTES
Riley Mcknight M.D.  Attending, Orthopaedic Surgery  Hand, Wrist, and Elbow Surgery  [facility]      ORTHOPAEDIC HAND, WRIST, AND ELBOW OFFICE  VISIT       ASSESSMENT/PLAN:      60 yo male with a right ring trigger finger    Anatomy and physiology of trigger finger was discussed along with treatment options. Initial treatment for this issue typically involves a steroid injection into the affected finger. This can be curative in about 70 % of cases, however if it is not there is a surgical procedure that can be done to resolve the issue. Risks, benefits, and alternatives were discussed and patient elected to proceed with initial right ring trigger finger injection today. Patient tolerated the procedure well with no immediate complications. If symptoms do persist about 6 weeks after the initial injection we can discuss either a repeat injection vs surgical trigger finger release. We will plan for repeat evaluation in 6 weeks.  We did discuss his diabetes and this injection will raise his blood sugar several points. We briefly discussed weight loss and anti-inflammatory diets as well as it pertains to his orthopedic issues      The patient verbalized understanding of exam findings and treatment plan. We engaged in the shared decision-making process and treatment options were discussed at length with the patient. Surgical and conservative management discussed today along with risks and benefits.    Diagnoses and all orders for this visit:    Trigger ring finger of right hand  -     Ambulatory Referral to Orthopedic Surgery  -     Hand/upper extremity injection    Dupuytren contracture of right hand  -     Ambulatory Referral to Orthopedic Surgery    Type 2 diabetes mellitus without complication, without long-term current use of insulin (HCC)        Follow Up:  Return if symptoms worsen or fail to improve.    To Do Next Visit:  Re-evaluation of current issue      General Discussions:  Trigger  Finger: The anatomy and physiology of trigger finger was discussed with the patient today in the office.  Edema and increased contact pressure within the flexor tendons at the A1 pulley can cause pain, crepitation, and triggering or locking of the digit resulting in limitation of function.  Symptoms can occur at anytime but are typically worse in the morning or after a brief rest from repetitive activity.  Treatment options include resting/nighttime MP blocking splints to decrease edema, oral anti-inflammatory medications, home or formal therapy exercises, up to 2 steroid injections within the tendon sheath, or surgical release.  While majority of patients do respond to conservative treatment, up to 20% may require surgical release.       ____________________________________________________________________________________________________________________________________________      CHIEF COMPLAINT:  Chief Complaint   Patient presents with    Right Hand - Pain     Ringer finger trigger finger. Clicking. He can't bend it gets stuck.        SUBJECTIVE:  Farhad Montaño is a 59 y.o. year old RHD male seen for consultation requested by Dr Maricruz Alfonso who presents for evaluation of a right ring trigger finger. Patient has hx of multiple trigger fingers that went on to releases. He has had this trigger finger for a long time and feels that it is getting more painful. The finger clicks but does not lock. 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    GERD (gastroesophageal reflux disease)     Joint pain in both hands 02/19/2021    Kidney stone      Kidney stones 02/19/2021    Mass of anterior abdominal wall 02/19/2021    LLQ 9x6 cm    MI (myocardial infarction) (HCC)     Morbid obesity with BMI of 50.0-59.9, adult (HCC)     Myocardial infarction (HCC) 06/11/2004    Need for hepatitis B vaccination 07/15/2021    Need for MMR vaccine 07/15/2021    Nonimmune to hepatitis B virus 03/04/2021    Obesity     Parotid gland enlargement 02/19/2021    left    Prediabetes 03/04/2021    Primary osteoarthritis of left knee 07/15/2021    RUQ pain 07/15/2021    Sarcoidosis 02/19/2021    Sleep apnea     cpap    Soft tissue mass 02/19/2021    R upper back 9x 11x3cm L upper back 14x20x 4 cm       PAST SURGICAL HISTORY:  Past Surgical History:   Procedure Laterality Date    BRONCHOSCOPY      COLONOSCOPY      EGD      HAND SURGERY Bilateral 2019    trigger finger and carpal tunnel     KNEE ARTHROSCOPY      meniscus surgery    MO EXCISION TUMOR SOFT TIS BACK/FLANK SUBQ 3 CM/> N/A 4/8/2021    Procedure: excision large mass back subfascial  25x 13x 3;  Surgeon: Angel Hubbard MD;  Location:  MAIN OR;  Service: General    VASECTOMY         FAMILY HISTORY:  Family History   Problem Relation Age of Onset    Heart disease Mother     Diabetes Mother     Heart disease Father     Cancer Father         blood cancer    Heart disease Paternal Aunt     Heart disease Paternal Uncle     Diabetes Maternal Grandfather     Stroke Neg Hx        SOCIAL HISTORY:  Social History     Tobacco Use    Smoking status: Never    Smokeless tobacco: Never   Vaping Use    Vaping status: Never Used   Substance Use Topics    Alcohol use: No    Drug use: No       MEDICATIONS:    Current Outpatient Medications:     albuterol (Ventolin HFA) 90 mcg/act inhaler, Inhale 2 puffs every 6 (six) hours as needed for wheezing or shortness of breath, Disp: 18 g, Rfl: 1    Ascorbic Acid (vitamin C) 100 MG tablet, Take 100 mg by mouth daily, Disp: , Rfl:     atorvastatin (LIPITOR) 80 mg tablet, Take 1 tablet (80 mg total) by  mouth daily, Disp: 90 tablet, Rfl: 1    Blood Glucose Monitoring Suppl (OneTouch Verio Reflect) w/Device KIT, Check blood sugars three times daily. Please substitute with appropriate alternative as covered by patient's insurance. Dx: E11.65, Disp: 1 kit, Rfl: 0    Cholecalciferol (VITAMIN D PO), Take 10,000 Units by mouth every other day , Disp: , Rfl:     Continuous Glucose Sensor (FreeStyle Mina 3 Sensor) MISC, Use 1 each every 14 (fourteen) days, Disp: 6 each, Rfl: 3    escitalopram (LEXAPRO) 10 mg tablet, Take 1 tablet (10 mg total) by mouth daily, Disp: 90 tablet, Rfl: 2    glucose blood (OneTouch Verio) test strip, Check blood sugars three times daily. Please substitute with appropriate alternative as covered by patient's insurance. Dx: E11.65, Disp: 300 each, Rfl: 3    Multiple Vitamins-Minerals (multivitamin with minerals) tablet, Take 1 tablet by mouth daily, Disp: , Rfl:     OneTouch Delica Lancets 33G MISC, Check blood sugars three times daily. Please substitute with appropriate alternative as covered by patient's insurance. Dx: E11.65, Disp: 300 each, Rfl: 3    pantoprazole (PROTONIX) 40 mg tablet, Take 1 tablet (40 mg total) by mouth daily before breakfast, Disp: 90 tablet, Rfl: 0    ALLERGIES:  Allergies   Allergen Reactions    Azithromycin Abdominal Pain    Erythromycin Abdominal Pain           REVIEW OF SYSTEMS:  Review of Systems   Constitutional:  Negative for chills and fever.   HENT:  Negative for ear pain and sore throat.    Eyes:  Negative for pain and visual disturbance.   Respiratory:  Negative for cough and shortness of breath.    Cardiovascular:  Negative for chest pain and palpitations.   Gastrointestinal:  Negative for abdominal pain and vomiting.   Genitourinary:  Negative for dysuria and hematuria.   Musculoskeletal:  Positive for arthralgias. Negative for back pain.   Skin:  Negative for color change and rash.   Neurological:  Negative for seizures and syncope.   All other systems  "reviewed and are negative.      VITALS:  There were no vitals filed for this visit.    LABS:      _____________________________________________________  PHYSICAL EXAMINATION:  General: well developed and well nourished, alert, oriented times 3, and appears comfortable  Psychiatric: Normal  HEENT: Normocephalic, Atraumatic Trachea Midline, No torticollis  Pulmonary: No audible wheezing or respiratory distress   Abdomen/GI: Non tender, non distended   Cardiovascular: No pitting edema, 2+ radial pulse   Skin: No masses, erythema, lacerations, fluctation, ulcerations  Neurovascular: Sensation Intact to the Median, Ulnar, Radial Nerve, Motor Intact to the Median, Ulnar, Radial Nerve, and Pulses Intact  Musculoskeletal: Normal, except as noted in detailed exam and in HPI.      MUSCULOSKELETAL EXAMINATION:  right ring finger:  Positive palpable nodule over the A1 pulley.  Positive tenderness to palpation over A1 pulley. Negative catching. Positive clicking.        ___________________________________________________  STUDIES REVIEWED:  None     LABS REVIEWED:    HgA1c:   Lab Results   Component Value Date    HGBA1C 7.1 (H) 07/02/2024     BMP:   Lab Results   Component Value Date    CALCIUM 9.6 07/02/2024    K 4.4 07/02/2024    CO2 27 07/02/2024     07/02/2024    BUN 16 07/02/2024    CREATININE 1.06 07/02/2024               PROCEDURES PERFORMED:  Hand/upper extremity injection: R ring A1  Universal Protocol:  procedure performed by consultantConsent: Verbal consent obtained.  Risks and benefits: risks, benefits and alternatives were discussed  Consent given by: patient  Time out: Immediately prior to procedure a \"time out\" was called to verify the correct patient, procedure, equipment, support staff and site/side marked as required.  Patient understanding: patient states understanding of the procedure being performed  Site marked: the operative site was marked  Required items: required blood products, implants, " devices, and special equipment available  Patient identity confirmed: verbally with patient  Supporting Documentation  Indications: pain and therapeutic   Procedure Details  Condition:trigger finger Location: ring finger - R ring A1   Preparation: Patient was prepped and draped in the usual sterile fashion  Needle size: 25 G  Ultrasound guidance: no  Approach: volar  Medications administered: 1 mL lidocaine 1 %; 40 mg dexamethasone 100 mg/10 mL  Patient tolerance: patient tolerated the procedure well with no immediate complications  Dressing:  Sterile dressing applied              _____________________________________________________      Scribe Attestation      I,:  Natalia Martinez PA-C am acting as a scribe while in the presence of the attending physician.:       I,:  Riley Mcknight MD personally performed the services described in this documentation    as scribed in my presence.:

## 2025-01-27 ENCOUNTER — OFFICE VISIT (OUTPATIENT)
Dept: UROLOGY | Facility: CLINIC | Age: 60
End: 2025-01-27
Payer: COMMERCIAL

## 2025-01-27 VITALS
OXYGEN SATURATION: 97 % | SYSTOLIC BLOOD PRESSURE: 158 MMHG | WEIGHT: 284 LBS | HEART RATE: 72 BPM | DIASTOLIC BLOOD PRESSURE: 80 MMHG | BODY MASS INDEX: 50.32 KG/M2 | HEIGHT: 63 IN

## 2025-01-27 DIAGNOSIS — E66.01 CLASS 3 SEVERE OBESITY DUE TO EXCESS CALORIES WITH BODY MASS INDEX (BMI) OF 50.0 TO 59.9 IN ADULT, UNSPECIFIED WHETHER SERIOUS COMORBIDITY PRESENT (HCC): ICD-10-CM

## 2025-01-27 DIAGNOSIS — N40.1 BENIGN PROSTATIC HYPERPLASIA WITH LOWER URINARY TRACT SYMPTOMS, SYMPTOM DETAILS UNSPECIFIED: Primary | ICD-10-CM

## 2025-01-27 DIAGNOSIS — E66.813 CLASS 3 SEVERE OBESITY DUE TO EXCESS CALORIES WITH BODY MASS INDEX (BMI) OF 50.0 TO 59.9 IN ADULT, UNSPECIFIED WHETHER SERIOUS COMORBIDITY PRESENT (HCC): ICD-10-CM

## 2025-01-27 LAB — POST-VOID RESIDUAL VOLUME, ML POC: 62 ML

## 2025-01-27 PROCEDURE — 99203 OFFICE O/P NEW LOW 30 MIN: CPT | Performed by: PHYSICIAN ASSISTANT

## 2025-01-27 PROCEDURE — 51798 US URINE CAPACITY MEASURE: CPT | Performed by: PHYSICIAN ASSISTANT

## 2025-01-27 NOTE — PROGRESS NOTES
UROLOGY CONSULTATION NOTE       Patient Identifiers: Farhad Montaño (MRN: [id number])  Service Requesting Consultation: Maricruz Alfonso MD    Service Providing Consultation:  Urology, Suhail Ibrahim PA-C  Consults  Date of Service: 1/27/2025    Reason for Consultation: BPH and voiding dysfunction    History of Present Illness:     Farhad Montaño is a 59 y.o. male with no prior urologic history.  His older brother has a history of prostate cancer.  He complains of urinary frequency and urgency for several years now.  More recently he has noticed having delayed start of his stream and having to push particularly in the morning.  He has morbid obesity his BMI is 50.3.  He is diabetic.  A1c is 7.1.  His last PSA was 1.74.  A CAT scan done a year ago had no abnormalities in the abdomen or pelvis.    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    GERD (gastroesophageal reflux disease)     Joint pain in both hands 02/19/2021    Kidney stone     Kidney stones 02/19/2021    Mass of anterior abdominal wall 02/19/2021    LLQ 9x6 cm    MI (myocardial infarction) (HCC)     Morbid obesity with BMI of 50.0-59.9, adult (HCC)     Myocardial infarction (HCC) 06/11/2004    Need for hepatitis B vaccination 07/15/2021    Need for MMR vaccine 07/15/2021    Nonimmune to hepatitis B virus 03/04/2021    Obesity     Parotid gland enlargement 02/19/2021    left    Prediabetes 03/04/2021    Primary osteoarthritis of left knee 07/15/2021    RUQ pain 07/15/2021    Sarcoidosis 02/19/2021    Sleep apnea     cpap    Soft tissue mass 02/19/2021    R upper back 9x 11x3cm L upper back 14x20x 4 cm   :    Past Surgical History:   Procedure Laterality Date    BRONCHOSCOPY      COLONOSCOPY      EGD      HAND SURGERY Bilateral 2019     trigger finger and carpal tunnel     KNEE ARTHROSCOPY      meniscus surgery    SD EXCISION TUMOR SOFT TIS BACK/FLANK SUBQ 3 CM/> N/A 4/8/2021    Procedure: excision large mass back subfascial  25x 13x 3;  Surgeon: Angel Hubbard MD;  Location:  MAIN OR;  Service: General    VASECTOMY     :    Medications, Allergies:     Current Outpatient Medications:     albuterol (Ventolin HFA) 90 mcg/act inhaler, Inhale 2 puffs every 6 (six) hours as needed for wheezing or shortness of breath, Disp: 18 g, Rfl: 1    Ascorbic Acid (vitamin C) 100 MG tablet, Take 100 mg by mouth daily, Disp: , Rfl:     atorvastatin (LIPITOR) 80 mg tablet, Take 1 tablet (80 mg total) by mouth daily, Disp: 90 tablet, Rfl: 1    Blood Glucose Monitoring Suppl (OneTouch Verio Reflect) w/Device KIT, Check blood sugars three times daily. Please substitute with appropriate alternative as covered by patient's insurance. Dx: E11.65, Disp: 1 kit, Rfl: 0    Cholecalciferol (VITAMIN D PO), Take 10,000 Units by mouth every other day , Disp: , Rfl:     Continuous Glucose Sensor (FreeStyle Mina 3 Sensor) MISC, Use 1 each every 14 (fourteen) days, Disp: 6 each, Rfl: 3    escitalopram (LEXAPRO) 10 mg tablet, Take 1 tablet (10 mg total) by mouth daily, Disp: 90 tablet, Rfl: 2    glucose blood (OneTouch Verio) test strip, Check blood sugars three times daily. Please substitute with appropriate alternative as covered by patient's insurance. Dx: E11.65, Disp: 300 each, Rfl: 3    Multiple Vitamins-Minerals (multivitamin with minerals) tablet, Take 1 tablet by mouth daily, Disp: , Rfl:     OneTouch Delica Lancets 33G MISC, Check blood sugars three times daily. Please substitute with appropriate alternative as covered by patient's insurance. Dx: E11.65, Disp: 300 each, Rfl: 3    pantoprazole (PROTONIX) 40 mg tablet, Take 1 tablet (40 mg total) by mouth daily before breakfast, Disp: 90 tablet, Rfl: 0    Allergies:  Allergies   Allergen Reactions    Azithromycin Abdominal  "Pain    Erythromycin Abdominal Pain   :    Social and Family History:   Social History:   Social History     Tobacco Use    Smoking status: Never    Smokeless tobacco: Never   Vaping Use    Vaping status: Never Used   Substance Use Topics    Alcohol use: No    Drug use: No   .    Social History     Tobacco Use   Smoking Status Never   Smokeless Tobacco Never       Family History:  Family History   Problem Relation Age of Onset    Heart disease Mother     Diabetes Mother     Heart disease Father     Cancer Father         blood cancer    Heart disease Paternal Aunt     Heart disease Paternal Uncle     Diabetes Maternal Grandfather     Stroke Neg Hx    :     Review of Systems:     General: Fever, chills, or night sweats: negative  Cardiac: Negative for chest pain.    Pulmonary: Negative for shortness of breath.  Gastrointestinal: Abdominal pain negative.  Nausea, vomiting, or diarrhea negative,  Genitourinary: See HPI above.  Patient does not have hematuria.  All other systems queried were negative.    Physical Exam:   General: Patient is pleasant and in NAD. Awake and alert  /80 (BP Location: Left arm, Patient Position: Sitting, Cuff Size: Adult)   Pulse 72   Ht 5' 3\" (1.6 m)   Wt 129 kg (284 lb)   SpO2 97%   BMI 50.31 kg/m²   HEENT:  Conjunctiva are clear  Constitutional:  pleasant and cooperative     no apparent distress  Cardiac: Peripheral edema: negative  Pulmonary: Non-labored breathing  Abdomen: Soft, non-tender, non-distended.  No surgical scars.  No masses, tenderness, hernias noted.    Genitourinary: Negative CVA tenderness, negative suprapubic tenderness.  Extremities:  Moves all extremities  Neurological:CNII-XII intact. No numbness or tingling. Essentially non focal neurologic exam  Psychiatric:mood affect and behavior normal    (Male): Penis , phallus normal, meatus patent.  Testicles descended into scrotum bilaterally without masses nor tenderness.  No inguinal hernias bilaterally.  SON: " Prostate difficult to palpate due to body habitus    Labs:     Lab Results   Component Value Date    HGB 16.7 07/02/2024    HCT 48.7 07/02/2024    WBC 6.41 07/02/2024     07/02/2024   ]    Lab Results   Component Value Date    K 4.4 07/02/2024     07/02/2024    CO2 27 07/02/2024    BUN 16 07/02/2024    CREATININE 1.06 07/02/2024    CALCIUM 9.6 07/02/2024   ]    Imaging:   I personally reviewed the images and report of the following studies, and reviewed them with the patient:  None  ASSESSMENT:     #1.  BPH  #2.  Morbid obesity  #3.  Diabetes    PLAN:   -Reviewed findings and options of management with the patient and his wife  -Offered a trial of an alpha-blocker and follow-up which he declined  -Ambulatory referral to medical weight loss which he is interested in  -I will see him back in 6 months and recheck symptoms and PVR  -If he changes his mind about the alpha-blocker he should let me know      Thank you for allowing me to participate in this patients’ care.  Please do not hesitate to call with any additional questions.  Suhail Ibrahim PA-C

## 2025-02-10 ENCOUNTER — RA CDI HCC (OUTPATIENT)
Dept: OTHER | Facility: HOSPITAL | Age: 60
End: 2025-02-10

## 2025-02-10 NOTE — PROGRESS NOTES
HCC coding opportunities          Chart Reviewed number of suggestions sent to Provider: 3  E11.22  E11.65  E66.01     Patients Insurance        Commercial Insurance: [employer]

## 2025-02-18 ENCOUNTER — APPOINTMENT (OUTPATIENT)
Dept: URGENT CARE | Facility: CLINIC | Age: 60
End: 2025-02-18
Payer: COMMERCIAL

## 2025-02-18 ENCOUNTER — OFFICE VISIT (OUTPATIENT)
Dept: FAMILY MEDICINE CLINIC | Facility: CLINIC | Age: 60
End: 2025-02-18
Payer: COMMERCIAL

## 2025-02-18 ENCOUNTER — TELEPHONE (OUTPATIENT)
Age: 60
End: 2025-02-18

## 2025-02-18 ENCOUNTER — RESULTS FOLLOW-UP (OUTPATIENT)
Dept: FAMILY MEDICINE CLINIC | Facility: CLINIC | Age: 60
End: 2025-02-18

## 2025-02-18 VITALS
TEMPERATURE: 98.3 F | HEART RATE: 96 BPM | SYSTOLIC BLOOD PRESSURE: 138 MMHG | RESPIRATION RATE: 18 BRPM | BODY MASS INDEX: 50.5 KG/M2 | WEIGHT: 285 LBS | DIASTOLIC BLOOD PRESSURE: 86 MMHG | OXYGEN SATURATION: 95 % | HEIGHT: 63 IN

## 2025-02-18 DIAGNOSIS — E11.9 TYPE 2 DIABETES MELLITUS WITHOUT COMPLICATION, WITHOUT LONG-TERM CURRENT USE OF INSULIN (HCC): Primary | ICD-10-CM

## 2025-02-18 DIAGNOSIS — R11.0 NAUSEA: ICD-10-CM

## 2025-02-18 DIAGNOSIS — F41.9 ANXIETY: ICD-10-CM

## 2025-02-18 DIAGNOSIS — H61.22 IMPACTED CERUMEN OF LEFT EAR: ICD-10-CM

## 2025-02-18 DIAGNOSIS — E78.2 MIXED HYPERLIPIDEMIA: ICD-10-CM

## 2025-02-18 DIAGNOSIS — K21.9 GASTROESOPHAGEAL REFLUX DISEASE, UNSPECIFIED WHETHER ESOPHAGITIS PRESENT: ICD-10-CM

## 2025-02-18 DIAGNOSIS — H92.02 ACUTE OTALGIA, LEFT: ICD-10-CM

## 2025-02-18 DIAGNOSIS — R06.02 SOBOE (SHORTNESS OF BREATH ON EXERTION): ICD-10-CM

## 2025-02-18 LAB
CREAT UR-MCNC: 117 MG/DL
MICROALBUMIN UR-MCNC: 74.8 MG/L
MICROALBUMIN/CREAT 24H UR: 64 MG/G CREATININE (ref 0–30)
SL AMB POCT HEMOGLOBIN AIC: 7.3 (ref ?–6.5)

## 2025-02-18 PROCEDURE — 82570 ASSAY OF URINE CREATININE: CPT | Performed by: FAMILY MEDICINE

## 2025-02-18 PROCEDURE — 83036 HEMOGLOBIN GLYCOSYLATED A1C: CPT | Performed by: FAMILY MEDICINE

## 2025-02-18 PROCEDURE — 82043 UR ALBUMIN QUANTITATIVE: CPT | Performed by: FAMILY MEDICINE

## 2025-02-18 PROCEDURE — 99214 OFFICE O/P EST MOD 30 MIN: CPT | Performed by: FAMILY MEDICINE

## 2025-02-18 RX ORDER — PANTOPRAZOLE SODIUM 40 MG/1
40 TABLET, DELAYED RELEASE ORAL
Qty: 90 TABLET | Refills: 0 | Status: SHIPPED | OUTPATIENT
Start: 2025-02-18

## 2025-02-18 RX ORDER — TIRZEPATIDE 2.5 MG/.5ML
2.5 INJECTION, SOLUTION SUBCUTANEOUS WEEKLY
Qty: 2 ML | Refills: 0 | Status: SHIPPED | OUTPATIENT
Start: 2025-02-18 | End: 2025-02-18 | Stop reason: SDUPTHER

## 2025-02-18 RX ORDER — TIRZEPATIDE 2.5 MG/.5ML
2.5 INJECTION, SOLUTION SUBCUTANEOUS WEEKLY
Qty: 2 ML | Refills: 0 | Status: SHIPPED | OUTPATIENT
Start: 2025-02-18

## 2025-02-18 RX ORDER — NEOMYCIN SULFATE, POLYMYXIN B SULFATE AND HYDROCORTISONE 10; 3.5; 1 MG/ML; MG/ML; [USP'U]/ML
4 SUSPENSION/ DROPS AURICULAR (OTIC) 4 TIMES DAILY
Qty: 10 ML | Refills: 0 | Status: SHIPPED | OUTPATIENT
Start: 2025-02-18 | End: 2025-02-28

## 2025-02-18 RX ORDER — ESCITALOPRAM OXALATE 10 MG/1
10 TABLET ORAL DAILY
Qty: 90 TABLET | Refills: 0 | Status: SHIPPED | OUTPATIENT
Start: 2025-02-18

## 2025-02-18 RX ORDER — ALBUTEROL SULFATE 90 UG/1
2 INHALANT RESPIRATORY (INHALATION) EVERY 6 HOURS PRN
Qty: 18 G | Refills: 1 | Status: SHIPPED | OUTPATIENT
Start: 2025-02-18

## 2025-02-18 RX ORDER — ONDANSETRON 4 MG/1
4 TABLET, FILM COATED ORAL EVERY 8 HOURS PRN
Qty: 20 TABLET | Refills: 0 | Status: SHIPPED | OUTPATIENT
Start: 2025-02-18

## 2025-02-18 RX ORDER — AMOXICILLIN 875 MG/1
875 TABLET, COATED ORAL 2 TIMES DAILY
Qty: 20 TABLET | Refills: 0 | Status: SHIPPED | OUTPATIENT
Start: 2025-02-18 | End: 2025-02-28

## 2025-02-18 RX ORDER — ATORVASTATIN CALCIUM 80 MG/1
80 TABLET, FILM COATED ORAL DAILY
Qty: 90 TABLET | Refills: 0 | Status: SHIPPED | OUTPATIENT
Start: 2025-02-18

## 2025-02-18 NOTE — PROGRESS NOTES
Name: Farhad Montaño      : 1965     MRN: [id number]  Encounter Provider: Maricruz Alfonso MD  Encounter Date: 2025  Encounter department: [facility]    Assessment & Plan  Type 2 diabetes mellitus without complication, without long-term current use of insulin (HCC)  Start mounjaro, previously discussed all the side effects.  Portion control, watch carbs intake  Continue atorvastatin   daily foot exam      Lab Results   Component Value Date    HGBA1C 7.3 (A) 2025       Orders:  •  POCT hemoglobin A1c  •  Albumin / creatinine urine ratio  •  Tirzepatide (Mounjaro) 2.5 MG/0.5ML SOAJ; Inject 2.5 mg under the skin once a week    Mixed hyperlipidemia    Orders:  •  atorvastatin (LIPITOR) 80 mg tablet; Take 1 tablet (80 mg total) by mouth daily    SOBOE (shortness of breath on exertion)    Orders:  •  albuterol (Ventolin HFA) 90 mcg/act inhaler; Inhale 2 puffs every 6 (six) hours as needed for wheezing or shortness of breath    Anxiety    Orders:  •  escitalopram (LEXAPRO) 10 mg tablet; Take 1 tablet (10 mg total) by mouth daily    Gastroesophageal reflux disease, unspecified whether esophagitis present    Orders:  •  pantoprazole (PROTONIX) 40 mg tablet; Take 1 tablet (40 mg total) by mouth daily before breakfast    Nausea    Orders:  •  ondansetron (ZOFRAN) 4 mg tablet; Take 1 tablet (4 mg total) by mouth every 8 (eight) hours as needed for nausea or vomiting    Acute otalgia, left  Otitis media suspected recommend amoxicillin 875 mg twice a day for 10 days  Orders:  •  amoxicillin (AMOXIL) 875 mg tablet; Take 1 tablet (875 mg total) by mouth 2 (two) times a day for 10 days  •  neomycin-polymyxin-hydrocortisone (CORTISPORIN) 0.35%-10,000 units/mL-1% otic suspension; Administer 4 drops to the right ear 4 (four) times a day for 10 days    Impacted cerumen of left ear  Recommend using Debrox eardrops and follow-up thereafter.  Avoid instrumentation of the ear.  It is  possible that he has otitis underneath will recommend amoxicillin as above                      Read package inserts for all medications before starting a new medications, call me if you have any questions.    Patient was given opportunity to ask questions and all questions were answered.    Subjective:     Farhad Montaño is a 59 y.o. male.    Type 2 diabetes- non compliant with meds, now finally willing to start Mounjaro, previously tried metformin transiently but did not continue  Hyperlipidemia- on atorvastatin 20 mg, states never picked up a atorvastatin 80 mg  Anxiety- on lexapro 10 mg, controlled   and also has a [employer] business.  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   • GERD (gastroesophageal reflux disease)    • Joint pain in both hands 02/19/2021   • Kidney stone    • Kidney stones 02/19/2021   • Mass of anterior abdominal wall 02/19/2021    LLQ 9x6 cm   • MI (myocardial infarction) (HCC)    • Morbid obesity with BMI of 50.0-59.9, adult (HCC)    • Myocardial infarction (HCC) 06/11/2004   • Need for hepatitis B vaccination 07/15/2021   • Need for MMR vaccine 07/15/2021   • Nonimmune to hepatitis B virus 03/04/2021   • Obesity    • Parotid gland enlargement 02/19/2021    left   • Prediabetes 03/04/2021   • Primary osteoarthritis of left knee 07/15/2021   • RUQ pain 07/15/2021   • Sarcoidosis 02/19/2021   • Sleep apnea     cpap   • Soft tissue mass  02/19/2021    R upper back 9x 11x3cm L upper back 14x20x 4 cm       Family History   Problem Relation Age of Onset   • Heart disease Mother    • Diabetes Mother    • Heart disease Father    • Cancer Father         blood cancer   • Heart disease Paternal Aunt    • Heart disease Paternal Uncle    • Diabetes Maternal Grandfather    • Stroke Neg Hx        Past Surgical History:   Procedure Laterality Date   • BRONCHOSCOPY     • COLONOSCOPY     • EGD     • HAND SURGERY Bilateral 2019    trigger finger and carpal tunnel    • KNEE ARTHROSCOPY      meniscus surgery   • GA EXCISION TUMOR SOFT TIS BACK/FLANK SUBQ 3 CM/> N/A 4/8/2021    Procedure: excision large mass back subfascial  25x 13x 3;  Surgeon: Angel Hubbard MD;  Location:  MAIN OR;  Service: General   • VASECTOMY          reports that he has never smoked. He has never used smokeless tobacco. He reports that he does not drink alcohol and does not use drugs.      Current Outpatient Medications:   •  albuterol (Ventolin HFA) 90 mcg/act inhaler, Inhale 2 puffs every 6 (six) hours as needed for wheezing or shortness of breath, Disp: 18 g, Rfl: 1  •  amoxicillin (AMOXIL) 875 mg tablet, Take 1 tablet (875 mg total) by mouth 2 (two) times a day for 10 days, Disp: 20 tablet, Rfl: 0  •  Ascorbic Acid (vitamin C) 100 MG tablet, Take 100 mg by mouth daily, Disp: , Rfl:   •  atorvastatin (LIPITOR) 80 mg tablet, Take 1 tablet (80 mg total) by mouth daily, Disp: 90 tablet, Rfl: 0  •  Blood Glucose Monitoring Suppl (OneTouch Verio Reflect) w/Device KIT, Check blood sugars three times daily. Please substitute with appropriate alternative as covered by patient's insurance. Dx: E11.65, Disp: 1 kit, Rfl: 0  •  Cholecalciferol (VITAMIN D PO), Take 10,000 Units by mouth every other day , Disp: , Rfl:   •  Continuous Glucose Sensor (FreeStyle Mina 3 Sensor) MISC, Use 1 each every 14 (fourteen) days, Disp: 6 each, Rfl: 3  •  escitalopram (LEXAPRO) 10 mg tablet, Take 1 tablet (10 mg  total) by mouth daily, Disp: 90 tablet, Rfl: 0  •  glucose blood (OneTouch Verio) test strip, Check blood sugars three times daily. Please substitute with appropriate alternative as covered by patient's insurance. Dx: E11.65, Disp: 300 each, Rfl: 3  •  Multiple Vitamins-Minerals (multivitamin with minerals) tablet, Take 1 tablet by mouth daily, Disp: , Rfl:   •  neomycin-polymyxin-hydrocortisone (CORTISPORIN) 0.35%-10,000 units/mL-1% otic suspension, Administer 4 drops to the right ear 4 (four) times a day for 10 days, Disp: 10 mL, Rfl: 0  •  ondansetron (ZOFRAN) 4 mg tablet, Take 1 tablet (4 mg total) by mouth every 8 (eight) hours as needed for nausea or vomiting, Disp: 20 tablet, Rfl: 0  •  OneTouch Delica Lancets 33G MISC, Check blood sugars three times daily. Please substitute with appropriate alternative as covered by patient's insurance. Dx: E11.65, Disp: 300 each, Rfl: 3  •  pantoprazole (PROTONIX) 40 mg tablet, Take 1 tablet (40 mg total) by mouth daily before breakfast, Disp: 90 tablet, Rfl: 0  •  Tirzepatide (Mounjaro) 2.5 MG/0.5ML SOAJ, Inject 2.5 mg under the skin once a week, Disp: 2 mL, Rfl: 0    The following portions of the patient's history were reviewed and updated as appropriate: allergies, current medications, past family history, past medical history, past social history, past surgical history and problem list.    Review of Systems   Constitutional:  Negative for chills and fever.   HENT:  Positive for ear pain and hearing loss. Negative for congestion, ear discharge, rhinorrhea and sore throat.    Eyes:  Negative for discharge, redness and itching.   Respiratory:  Negative for chest tightness, shortness of breath and wheezing.    Cardiovascular:  Negative for chest pain and palpitations.   Gastrointestinal:  Negative for abdominal pain, constipation and diarrhea.   Genitourinary:  Negative for dysuria.   Skin:  Negative for pallor and rash.   Neurological:  Negative for dizziness, weakness,  "numbness and headaches.         PHQ-2/9 Depression Screening    Little interest or pleasure in doing things: 0 - not at all  Feeling down, depressed, or hopeless: 0 - not at all  PHQ-2 Score: 0  PHQ-2 Interpretation: Negative depression screen             Objective:    /86 (BP Location: Right arm, Patient Position: Sitting, Cuff Size: Large)   Pulse 96   Temp 98.3 °F (36.8 °C) (Tympanic)   Resp 18   Ht 5' 3\" (1.6 m)   Wt 129 kg (285 lb)   SpO2 95%   BMI 50.49 kg/m²      Physical Exam  Vitals and nursing note reviewed.   Constitutional:       Appearance: Normal appearance. He is obese.   HENT:      Head: Normocephalic and atraumatic.      Right Ear: Tympanic membrane, ear canal and external ear normal.      Left Ear: External ear normal. There is impacted cerumen.      Ears:      Comments: Tried to curette however stopped due to discomfort anaphylaxis probably on the tympanic membrane  Eyes:      General:         Right eye: No discharge.         Left eye: No discharge.      Conjunctiva/sclera: Conjunctivae normal.   Cardiovascular:      Rate and Rhythm: Normal rate and regular rhythm.      Heart sounds: No murmur heard.  Pulmonary:      Effort: Pulmonary effort is normal.      Breath sounds: Normal breath sounds. No wheezing.   Abdominal:      General: There is no distension.      Palpations: Abdomen is soft.      Tenderness: There is no abdominal tenderness.   Musculoskeletal:      Right lower leg: No edema.      Left lower leg: No edema.   Skin:     Findings: No lesion or rash.   Neurological:      Mental Status: He is alert. Mental status is at baseline.   Psychiatric:         Mood and Affect: Mood normal.         Thought Content: Thought content normal.         Recent Results (from the past 52 weeks)   Hemoglobin A1C    Collection Time: 07/02/24  9:03 AM   Result Value Ref Range    Hemoglobin A1C 7.1 (H) Normal 4.0-5.6%; PreDiabetic 5.7-6.4%; Diabetic >=6.5%; Glycemic control for adults with diabetes " <7.0% %     mg/dl   CBC and differential    Collection Time: 07/02/24  9:03 AM   Result Value Ref Range    WBC 6.41 4.31 - 10.16 Thousand/uL    RBC 5.67 (H) 3.88 - 5.62 Million/uL    Hemoglobin 16.7 12.0 - 17.0 g/dL    Hematocrit 48.7 36.5 - 49.3 %    MCV 86 82 - 98 fL    MCH 29.5 26.8 - 34.3 pg    MCHC 34.3 31.4 - 37.4 g/dL    RDW 13.2 11.6 - 15.1 %    MPV 10.0 8.9 - 12.7 fL    Platelets 203 149 - 390 Thousands/uL    nRBC 0 /100 WBCs    Segmented % 70 43 - 75 %    Immature Grans % 1 0 - 2 %    Lymphocytes % 17 14 - 44 %    Monocytes % 8 4 - 12 %    Eosinophils Relative 3 0 - 6 %    Basophils Relative 1 0 - 1 %    Absolute Neutrophils 4.50 1.85 - 7.62 Thousands/µL    Absolute Immature Grans 0.06 0.00 - 0.20 Thousand/uL    Absolute Lymphocytes 1.09 0.60 - 4.47 Thousands/µL    Absolute Monocytes 0.50 0.17 - 1.22 Thousand/µL    Eosinophils Absolute 0.20 0.00 - 0.61 Thousand/µL    Basophils Absolute 0.06 0.00 - 0.10 Thousands/µL   Comprehensive metabolic panel    Collection Time: 07/02/24  9:03 AM   Result Value Ref Range    Sodium 138 135 - 147 mmol/L    Potassium 4.4 3.5 - 5.3 mmol/L    Chloride 103 96 - 108 mmol/L    CO2 27 21 - 32 mmol/L    ANION GAP 8 4 - 13 mmol/L    BUN 16 5 - 25 mg/dL    Creatinine 1.06 0.60 - 1.30 mg/dL    Glucose, Fasting 150 (H) 65 - 99 mg/dL    Calcium 9.6 8.4 - 10.2 mg/dL    AST 20 13 - 39 U/L    ALT 32 7 - 52 U/L    Alkaline Phosphatase 72 34 - 104 U/L    Total Protein 7.5 6.4 - 8.4 g/dL    Albumin 4.6 3.5 - 5.0 g/dL    Total Bilirubin 0.72 0.20 - 1.00 mg/dL    eGFR 76 ml/min/1.73sq m   Lipid panel    Collection Time: 07/02/24  9:03 AM   Result Value Ref Range    Cholesterol 224 (H) See Comment mg/dL    Triglycerides 256 (H) See Comment mg/dL    HDL, Direct 43 >=40 mg/dL    LDL Calculated 130 (H) 0 - 100 mg/dL    Non-HDL-Chol (CHOL-HDL) 181 mg/dl   IRIS Diabetic eye exam    Collection Time: 12/16/24 12:32 PM   Result Value Ref Range    Severity NORMAL     Right Eye Diabetic  Retinopathy None     Right Eye Macular Edema None     Right Eye Other Retinopathy None     Right Eye Image Quality Gradable Image     Left Eye Diabetic Retinopathy None     Left Eye Macular Edema None     Left Eye Other Retinopathy None     Left Eye Image Quality Gradable Image     Result Retinal Study Result for LEIGHA SEVERINO     Result      Result       foreign SWANSON 60 y/o, M (: 1965, MRN: [id number])    Result       presented to [facility] on 2024 for a retinal imaging study of the left and right eyes.    Result      Result       Based on the findings of the study, the following is recommended for LEIGHA SEVERINO    Result       Normal Study: Re-scan the patient in 12 months or in the next calendar year.    Result      Result       Interpreting Provider's Comments:  No comments provided    Result       Diagnoses Present: E119 - Type 2 diabetes mellitus without complications    Result      Result       Right eye findings: Negative for Diabetic Retinopathy    Result Negative for Macular Edema     Result      Result       Left eye findings: Negative for Diabetic Retinopathy    Result Negative for Macular Edema     Result      Result       This result was electronically signed by Jostin Mayorga MD, NPI: [id number], Taxonomy: [id number] on 2024 17:49 [facility].    Result      Result       NOTE:  Any pathology noted on this diabetic retinal evaluation should be confirmed by an appropriate ophthalmic examination.   POCT Measure PVR    Collection Time: 25 11:45 AM   Result Value Ref Range    POST-VOID RESIDUAL VOLUME, ML POC 62 mL   POCT hemoglobin A1c    Collection Time: 25  9:55 AM   Result Value Ref Range    Hemoglobin A1C 7.3 (A) <=6.5   Albumin / creatinine urine ratio    Collection Time: 25  9:55 AM   Result Value Ref Range    Creatinine, Ur 117.0 Reference range not established. mg/dL    Albumin,U,Random 74.8 (H) <20.0 mg/L    Albumin Creat  Ratio 64 (H) 0 - 30 mg/g creatinine       Laboratory Results: I have personally reviewed the pertinent laboratory results/reports     Radiology/Other Diagnostic Testing Results: I have reviewed the following imaging and agree with the interpretation below.    CT abdomen pelvis w contrast  Result Date: 1/3/2024  CT ABDOMEN AND PELVIS WITH IV CONTRAST INDICATION:   R10.11: Right upper quadrant pain. COMPARISON: CT scan from 3/2/2021. TECHNIQUE:  CT examination of the abdomen and pelvis was performed. Multiplanar 2D reformatted images were created from the source data. This examination, like all CT scans performed in the [facility], was performed utilizing techniques to minimize radiation dose exposure, including the use of iterative reconstruction and automated exposure control. Radiation dose length product (DLP) for this visit:  1733.35 mGy-cm IV Contrast:  100 mL of iohexol (OMNIPAQUE) Enteric Contrast: Enteric contrast was administered. FINDINGS: ABDOMEN LOWER CHEST: Chronic right middle lobe atelectasis with endobronchial obstruction again noted. No pericardial or pleural effusion. LIVER/BILIARY TREE: Liver is enlarged and diffusely decreased in density consistent with fatty change. Stable hypodense lesion in the lateral right lobe measuring 9 mm likely a cyst. No CT evidence of suspicious hepatic mass.  Normal hepatic contours.  No biliary dilatation. GALLBLADDER:  No calcified gallstones. No pericholecystic inflammatory change. SPLEEN:  Unremarkable. PANCREAS:  Unremarkable. ADRENAL GLANDS:  Unremarkable. KIDNEYS/URETERS:  Unremarkable. No hydronephrosis. STOMACH AND BOWEL: There is colonic diverticulosis without evidence of acute diverticulitis. No bowel obstruction. APPENDIX: A normal appendix was visualized. ABDOMINOPELVIC CAVITY:  No ascites.  No pneumoperitoneum.  No lymphadenopathy. VESSELS: Atherosclerotic changes are present.  No evidence of aneurysm. PELVIS REPRODUCTIVE ORGANS: The  "prostate is enlarged. URINARY BLADDER:  Unremarkable. ABDOMINAL WALL/INGUINAL REGIONS: Fat-containing right inguinal hernia without induration unchanged. OSSEOUS STRUCTURES:  No acute fracture or destructive osseous lesion.     No acute inflammatory process identified. Chronic right middle lobe atelectasis. Enlarged fatty liver with small cyst in the right lobe unchanged. Colonic diverticulosis without diverticulitis. Workstation performed: [id number]        Disclaimer: Portions of the record may have been created with voice recognition software. Occasional wrong word or \"sound a like\" substitutions may have occurred due to the inherent limitations of voice recognition software. Read the chart carefully and recognize, using context, where substitutions have occurred. I have used the Epic copy/forward function to compose this note. I have reviewed my current note to ensure it reflects the current patient status, exam, assessment and plan.  "

## 2025-02-18 NOTE — TELEPHONE ENCOUNTER
PA for MOUNJARO 2.5MG SUBMITTED to [employer]    via      [x][employer]-Case ID #     [x]PA sent as URGENT    All office notes, labs and other pertaining documents and studies sent. Clinical questions answered. Awaiting determination from insurance company.     Turnaround time for your insurance to make a decision on your Prior Authorization can take 7-21 business days.

## 2025-02-18 NOTE — ASSESSMENT & PLAN NOTE
Orders:    albuterol (Ventolin HFA) 90 mcg/act inhaler; Inhale 2 puffs every 6 (six) hours as needed for wheezing or shortness of breath

## 2025-02-18 NOTE — ASSESSMENT & PLAN NOTE
Start mounjaro, previously discussed all the side effects.  Portion control, watch carbs intake  Continue atorvastatin   daily foot exam      Lab Results   Component Value Date    HGBA1C 7.3 (A) 02/18/2025       Orders:    POCT hemoglobin A1c    Albumin / creatinine urine ratio    Tirzepatide (Mounjaro) 2.5 MG/0.5ML SOAJ; Inject 2.5 mg under the skin once a week

## 2025-02-18 NOTE — ASSESSMENT & PLAN NOTE
Orders:    pantoprazole (PROTONIX) 40 mg tablet; Take 1 tablet (40 mg total) by mouth daily before breakfast

## 2025-02-19 DIAGNOSIS — E11.29 PROTEINURIA DUE TO TYPE 2 DIABETES MELLITUS  (HCC): ICD-10-CM

## 2025-02-19 DIAGNOSIS — E11.9 TYPE 2 DIABETES MELLITUS WITHOUT COMPLICATION, WITHOUT LONG-TERM CURRENT USE OF INSULIN (HCC): Primary | ICD-10-CM

## 2025-02-19 DIAGNOSIS — R80.9 PROTEINURIA DUE TO TYPE 2 DIABETES MELLITUS  (HCC): ICD-10-CM

## 2025-02-19 RX ORDER — LISINOPRIL 2.5 MG/1
2.5 TABLET ORAL DAILY
Qty: 30 TABLET | Refills: 5 | Status: SHIPPED | OUTPATIENT
Start: 2025-02-19

## 2025-02-21 NOTE — TELEPHONE ENCOUNTER
PA for MOUNJARO 2.5MG APPROVED     Date(s) approved       Patient advised by          [x]MyChart Message  []Phone call   []LMOM  []L/M to call office as no active Communication consent on file  []Unable to leave detailed message as VM not approved on Communication consent       Pharmacy advised by    [x]Fax  []Phone call         Approval letter scanned into [employer] Yes

## 2025-03-07 ENCOUNTER — TELEPHONE (OUTPATIENT)
Age: 60
End: 2025-03-07

## 2025-03-07 NOTE — TELEPHONE ENCOUNTER
Patient called to reschedule appointment he missed 03/07/2025 at 9:15. New appointment is 04/14/2025 at 12:45 am.

## 2025-03-11 ENCOUNTER — NURSE TRIAGE (OUTPATIENT)
Age: 60
End: 2025-03-11

## 2025-03-11 NOTE — TELEPHONE ENCOUNTER
Regarding: Kidney Pain  ----- Message from Ayanna BURKETT sent at 3/11/2025 11:44 AM EDT -----  Good morning Dr Alfonso  I've been experiencing kidney pain both sides is this a normal feeling or associated with the findings of the last set of labs  donna would you recommend  it's not excruciating pain more of a nagging pain  Thank you

## 2025-03-11 NOTE — TELEPHONE ENCOUNTER
Increase hydration to at least 64 ounces of water.  If the patient has bilateral it is less likely coming from the kidneys unless he has bilateral obstructive uropathy.  Generally in that case the pain is much more severe.  Keep the upcoming appointment will do urinalysis at the visit

## 2025-03-11 NOTE — TELEPHONE ENCOUNTER
FOLLOW UP: See MYC message/Call patient for triage/appointment.     REASON FOR CONVERSATION: No Triage Call    SYMPTOMS: Kidney Pain     OTHER: Attempted to triage patient 3 times.  Left voice mail for patient to contact office about symptoms.     DISPOSITION: No Contact Call    Reason for Disposition   Third attempt to contact caller AND no contact made. Phone number verified.    Protocols used: No Contact or Duplicate Contact Call-Adult-[place]

## 2025-03-11 NOTE — TELEPHONE ENCOUNTER
"FOLLOW UP: has appt on 3/13    REASON FOR CONVERSATION: Flank Pain    SYMPTOMS: B/L kidney pain and dark urine    OTHER: started on 3/6  Care advice and ED Precautions given    DISPOSITION: See Within 3 Days in Office      Reason for Disposition   MILD pain (i.e., scale 1-3; does not interfere with normal activities) and present > 3 days    Answer Assessment - Initial Assessment Questions  1. LOCATION: \"Where does it hurt?\" (e.g., left, right)      both  2. ONSET: \"When did the pain start?\"      3/6  3. SEVERITY: \"How bad is the pain?\" (e.g., Scale 1-10; mild, moderate, or severe)      *No Answer*  4. PATTERN: \"Does the pain come and go, or is it constant?\"       Comes and goes  5. CAUSE: \"What do you think is causing the pain?\"      *No Answer*  6. OTHER SYMPTOMS:  \"Do you have any other symptoms?\" (e.g., fever, abdomen pain, vomiting, leg weakness, burning with urination, blood in urine)      *No Answer*    Protocols used: Flank Pain-Adult-[place]    "

## 2025-03-18 ENCOUNTER — TELEPHONE (OUTPATIENT)
Dept: FAMILY MEDICINE CLINIC | Facility: CLINIC | Age: 60
End: 2025-03-18

## 2025-03-18 DIAGNOSIS — R80.9 PROTEINURIA DUE TO TYPE 2 DIABETES MELLITUS  (HCC): ICD-10-CM

## 2025-03-18 DIAGNOSIS — E11.9 TYPE 2 DIABETES MELLITUS WITHOUT COMPLICATION, WITHOUT LONG-TERM CURRENT USE OF INSULIN (HCC): ICD-10-CM

## 2025-03-18 DIAGNOSIS — E11.29 PROTEINURIA DUE TO TYPE 2 DIABETES MELLITUS  (HCC): ICD-10-CM

## 2025-03-19 RX ORDER — LISINOPRIL 2.5 MG/1
2.5 TABLET ORAL DAILY
Qty: 90 TABLET | Refills: 1 | Status: SHIPPED | OUTPATIENT
Start: 2025-03-19

## 2025-04-14 ENCOUNTER — TELEPHONE (OUTPATIENT)
Age: 60
End: 2025-04-14

## 2025-05-29 ENCOUNTER — OFFICE VISIT (OUTPATIENT)
Dept: BARIATRICS | Facility: CLINIC | Age: 60
End: 2025-05-29
Attending: PHYSICIAN ASSISTANT
Payer: COMMERCIAL

## 2025-05-29 VITALS
DIASTOLIC BLOOD PRESSURE: 70 MMHG | OXYGEN SATURATION: 98 % | HEIGHT: 63 IN | WEIGHT: 274.6 LBS | HEART RATE: 83 BPM | SYSTOLIC BLOOD PRESSURE: 142 MMHG | BODY MASS INDEX: 48.66 KG/M2

## 2025-05-29 DIAGNOSIS — E66.813 CLASS 3 SEVERE OBESITY DUE TO EXCESS CALORIES WITH SERIOUS COMORBIDITY AND BODY MASS INDEX (BMI) OF 45.0 TO 49.9 IN ADULT: Primary | ICD-10-CM

## 2025-05-29 DIAGNOSIS — E11.9 TYPE 2 DIABETES MELLITUS WITHOUT COMPLICATION, WITHOUT LONG-TERM CURRENT USE OF INSULIN (HCC): ICD-10-CM

## 2025-05-29 DIAGNOSIS — G47.33 OSA ON CPAP: ICD-10-CM

## 2025-05-29 PROCEDURE — 99204 OFFICE O/P NEW MOD 45 MIN: CPT | Performed by: NURSE PRACTITIONER

## 2025-05-29 RX ORDER — TIRZEPATIDE 2.5 MG/.5ML
2.5 INJECTION, SOLUTION SUBCUTANEOUS WEEKLY
Qty: 2 ML | Refills: 0 | Status: SHIPPED | OUTPATIENT
Start: 2025-05-29

## 2025-05-29 NOTE — ASSESSMENT & PLAN NOTE
Patient will start on Mounjaro for blood sugar control  Patient will continue to follow with PCP for further diabetes management  Lab Results   Component Value Date    HGBA1C 7.3 (A) 02/18/2025

## 2025-05-29 NOTE — ASSESSMENT & PLAN NOTE
- Discussed options of HealthyCORE-Intensive Lifestyle Intervention Program, Very Low Calorie Diet-VLCD, and Conservative Program and the role of weight loss medications.  - Patient is interested in pursuing Conservative Program and follow up visits with medical weight management provider.  - Explained the importance of making lifestyle changes in addition to starting any anti-obesity medications.   - Initial weight loss goal of 5-10% weight loss for improved health. Weight loss can improve patient's co-morbid conditions and/or prevent weight-related complications.  - Weight is not at goal and patient has been unable to achieve a meaningful weight loss above 5% using various programs and tools for more than 6 months  - Labs reviewed from 7/2024 and 2/2025    General Recommendations:  Nutrition:  Eat breakfast daily.  Do not skip meals.      Food log (ie.) www.[email or web address], sparkpeople.com, loseit.com, calorieking.com, etc.     Practice mindful eating.  Be sure to set aside time to eat, eat slowly, and savor your food.     Hydration:    At least 64oz of water daily.  No sugar sweetened beverages.  No juice (eat the fruit instead).     Exercise:  Studies have shown that the ideal exercise goal is somewhere between 150 to 300 minutes of moderate intensity exercise a week.  Start with exercising 10 minutes every other day and gradually increase physical activity with a goal of at least 150 minutes of moderate intensity exercise a week, divided over at least 3 days a week.  An example of this would be exercising 30 minutes a day, 5 days a week.  Resistance training can increase muscle mass and increase our resting metabolic rate.   FULL BODY resistance training is recommended 2-3 times a week.  Do not do this on consecutive days to allow for muscle recovery.     Aim for a bare minimum 5000 steps, even on days you do not exercise.     Monitoring:   Weigh yourself daily.  If this causes undue stress, then just weigh  yourself once a week.  Weigh yourself the same time of the day with the same amount of clothing on.  Preferably this should be done after waking up, before you eat, and with no clothing or minimal clothing on.     Specific Goals:  Patient lifestyle habits were reviewed and barriers to weight loss were addressed today.  Nutrition was discussed and patient will be meeting with the dietitian to learn about nutrition, portion control, and meal structure.  He will work on possibly meal prepping his food for the week to stay on track and make sure he is getting all his nutrition requirements.  He will also learn about balancing carbohydrates and protein to better control his blood sugars.  Activity was discussed and patient was encouraged to incorporate small amounts of low impact activity to start to increase his activity level slowly over time.  Medications were discussed:  Phentermine to be avoided due to blood pressure  Topiramate to be used cautiously as patient does have history of kidney stones  Bupropion/naltrexone was discussed I would use cautiously due to blood pressure and patient's current treatment with Lexapro  GLP-1 therapy was discussed and patient was encouraged to start Mounjaro that was previously prescribed by his PCP.  Discussed the risk and benefits of the medication and how will likely improve his weight, his blood sugar control, and may improve his sleep apnea symptoms.  Patient was in agreement to try this medication and will start at 2.5 mg for 1 month and advance to 5 mg.  He will follow-up in the office at that time to determine if the dose needs to be advanced further.    Mounjaro Instructions:    - Begin Mounjaro 2.5 mg subcutaneously once a week. Dose changes may occur after 4 doses if medication is tolerated. You will be assessed prior to each dose change to make sure you are tolerating the medication well.  - Please message me when you have 2 pens left from the prescription so there are  no lapses in treatment.  - If you have been off the medication for more than 14 days please contact the office as you will need to restart the titration at the starting dose again to avoid significant side effects or adverse events.  - Visit [email or web address] for further information/injection instructions.   -Please eat small frequent meals to help reduce nausea. Lemon water and saltine crackers may help with this.   - Side effects of Mounjaro discussed: nausea, vomiting, diarrhea, and constipation. If you experience fever, nausea/vomiting, and pain radiating to your back this may be a sign of pancreatitis. Please go to the emergency room if this occurs.  - If on oral birth control a 2nd method of birth control is recommended during the 1st 8 weeks of therapy and for 4 weeks after any dosage change.   - Patient understands the side effects of the medication and proper administration. Patient agrees with the treatment plan and all questions were answered.  -Supply concerns were discussed with patient and patient voiced understanding that they will need to call with adequate time for refills to avoid any lapses in treatment.  Patient may also have to call around to different pharmacies to see if any pharmacy has the appropriate dose in stock.  - Pen demonstrated in the office today

## 2025-05-29 NOTE — PROGRESS NOTES
Assessment/Plan:    Class 3 severe obesity due to excess calories with serious comorbidity and body mass index (BMI) of 45.0 to 49.9 in adult  - Discussed options of Healthy[facility]-Intensive Lifestyle Intervention Program, Very Low Calorie Diet-VLCD, and Conservative Program and the role of weight loss medications.  - Patient is interested in pursuing Conservative Program and follow up visits with medical weight management provider.  - Explained the importance of making lifestyle changes in addition to starting any anti-obesity medications.   - Initial weight loss goal of 5-10% weight loss for improved health. Weight loss can improve patient's co-morbid conditions and/or prevent weight-related complications.  - Weight is not at goal and patient has been unable to achieve a meaningful weight loss above 5% using various programs and tools for more than 6 months  - Labs reviewed from 7/2024 and 2/2025    General Recommendations:  Nutrition:  Eat breakfast daily.  Do not skip meals.      Food log (ie.) [email or web address], sparkpeople.com, loseit.com, calorieking.com, etc.     Practice mindful eating.  Be sure to set aside time to eat, eat slowly, and savor your food.     Hydration:    At least 64oz of water daily.  No sugar sweetened beverages.  No juice (eat the fruit instead).     Exercise:  Studies have shown that the ideal exercise goal is somewhere between 150 to 300 minutes of moderate intensity exercise a week.  Start with exercising 10 minutes every other day and gradually increase physical activity with a goal of at least 150 minutes of moderate intensity exercise a week, divided over at least 3 days a week.  An example of this would be exercising 30 minutes a day, 5 days a week.  Resistance training can increase muscle mass and increase our resting metabolic rate.   FULL BODY resistance training is recommended 2-3 times a week.  Do not do this on consecutive days to allow for muscle recovery.     Aim for a bare  minimum 5000 steps, even on days you do not exercise.     Monitoring:   Weigh yourself daily.  If this causes undue stress, then just weigh yourself once a week.  Weigh yourself the same time of the day with the same amount of clothing on.  Preferably this should be done after waking up, before you eat, and with no clothing or minimal clothing on.     Specific Goals:  Patient lifestyle habits were reviewed and barriers to weight loss were addressed today.  Nutrition was discussed and patient will be meeting with the dietitian to learn about nutrition, portion control, and meal structure.  He will work on possibly meal prepping his food for the week to stay on track and make sure he is getting all his nutrition requirements.  He will also learn about balancing carbohydrates and protein to better control his blood sugars.  Activity was discussed and patient was encouraged to incorporate small amounts of low impact activity to start to increase his activity level slowly over time.  Medications were discussed:  Phentermine to be avoided due to blood pressure  Topiramate to be used cautiously as patient does have history of kidney stones  Bupropion/naltrexone was discussed I would use cautiously due to blood pressure and patient's current treatment with Lexapro  GLP-1 therapy was discussed and patient was encouraged to start Mounjaro that was previously prescribed by his PCP.  Discussed the risk and benefits of the medication and how will likely improve his weight, his blood sugar control, and may improve his sleep apnea symptoms.  Patient was in agreement to try this medication and will start at 2.5 mg for 1 month and advance to 5 mg.  He will follow-up in the office at that time to determine if the dose needs to be advanced further.    Mounjaro Instructions:    - Begin Mounjaro 2.5 mg subcutaneously once a week. Dose changes may occur after 4 doses if medication is tolerated. You will be assessed prior to each dose  change to make sure you are tolerating the medication well.  - Please message me when you have 2 pens left from the prescription so there are no lapses in treatment.  - If you have been off the medication for more than 14 days please contact the office as you will need to restart the titration at the starting dose again to avoid significant side effects or adverse events.  - Visit [email or web address] for further information/injection instructions.   -Please eat small frequent meals to help reduce nausea. Lemon water and saltine crackers may help with this.   - Side effects of Mounjaro discussed: nausea, vomiting, diarrhea, and constipation. If you experience fever, nausea/vomiting, and pain radiating to your back this may be a sign of pancreatitis. Please go to the emergency room if this occurs.  - If on oral birth control a 2nd method of birth control is recommended during the 1st 8 weeks of therapy and for 4 weeks after any dosage change.   - Patient understands the side effects of the medication and proper administration. Patient agrees with the treatment plan and all questions were answered.  -Supply concerns were discussed with patient and patient voiced understanding that they will need to call with adequate time for refills to avoid any lapses in treatment.  Patient may also have to call around to different pharmacies to see if any pharmacy has the appropriate dose in stock.  - Pen demonstrated in the office today    Type 2 diabetes mellitus without complication, without long-term current use of insulin (HCC)  Patient will start on Mounjaro for blood sugar control  Patient will continue to follow with PCP for further diabetes management  Lab Results   Component Value Date    HGBA1C 7.3 (A) 02/18/2025       EULALIO on CPAP  May improve with tirzepatide treatment         Farhad was seen today for consult.    Diagnoses and all orders for this visit:    Class 3 severe obesity due to excess calories with serious comorbidity  and body mass index (BMI) of 45.0 to 49.9 in adult    Type 2 diabetes mellitus without complication, without long-term current use of insulin (HCC)  -     Tirzepatide (Mounjaro) 2.5 MG/0.5ML SOAJ; Inject 2.5 mg under the skin once a week    BUNNY on CPAP       Patient denies personal history of pancreatitis. Patient also denies personal and family history of medullary thyroid cancer and multiple endocrine neoplasia type 2 (MEN 2 tumor).       Total time spent reviewing chart, interviewing patient, examining patient, discussing plan, answering all questions, and documentin min, with >50% face-to-face time spent counseling patient on nonsurgical interventions for the treatment of excess weight. Discussed in detail nonsurgical options including intensive lifestyle intervention program, very low-calorie diet program and conservative program.  Discussed the role of weight loss medications.  Counseled patient on diet behavior and exercise modification for weight loss.    Follow up in approximately 3 months with Non-Surgical Physician/Advanced Practitioner.    Subjective:   Chief Complaint   Patient presents with    Consult     Dx Bunny use CPAP       Patient ID: Farhad Montaño  is a 60 y.o. male with excess weight/obesity here to pursue weight management.  Previous notes and records have been reviewed.    Past Medical History[1]  Past Surgical History[2]    HPI:  Wt Readings from Last 20 Encounters:   25 125 kg (274 lb 9.6 oz)   25 129 kg (285 lb)   25 129 kg (284 lb)   25 127 kg (281 lb)   24 128 kg (281 lb 9.6 oz)   24 132 kg (292 lb)   24 132 kg (290 lb 3.2 oz)   24 130 kg (285 lb 12.8 oz)   01/10/24 131 kg (288 lb 6.4 oz)   24 129 kg (284 lb)   24 129 kg (284 lb 6.4 oz)   23 131 kg (288 lb)   23 130 kg (286 lb 6.4 oz)   23 130 kg (287 lb 3.2 oz)   23 127 kg (281 lb)   22 126 kg (278 lb)   22 126 kg (278 lb)   22  "128 kg (282 lb)   01/24/22 129 kg (284 lb)   01/10/22 130 kg (286 lb)       Patient presents today to medical weight management office for consult.  Patient has been struggling with his weight for many years.  He states he was always athletic when he was younger and able to maintain his weight but over the years has gained weight.  He is not concerned about his physical health as he is diabetic, has sleep apnea, is prescribed both blood pressure and cholesterol medications.  He would like to get his health better controlled so that his knees improved and his overall health improves.  He is currently followed by the sleep specialist and on a CPAP machine nightly but states he still has episodes of apnea and excessive snoring.  He also struggles with anxiety at times which causes him to have shortness of breath.  He takes Lexapro which she is not sure is helping.  He was prescribed Mounjaro by his PCP in February but never started the medication as he was unclear as to how it would benefit his health.  He states his highest weight was 290 pounds and he has been able to reduce his weight with some lifestyle changes.  He has been avoiding snacking and following an intermittent fasting plan at times.  He has been trying to use healthier food options such as 647 bread or avocado bread.  Patient would be willing to meet with a dietitian and would like to further discuss weight loss medications to determine what would be the best path for him.  He would like to avoid weight loss surgery.  Patient is limited with his activity due to his bilateral knee pain but will be starting aquatic therapy in a couple of weeks.    Starting MWM weight: 274.6 lbs  Starting BMI: 48.64  Starting Waist Measurement: 53 in   Goal weight: \"get healthy\"    Obesity/Excess Weight:  Severity: Very Severe  Onset:  20+ years    Modifiers: Diet and Exercise  Contributing factors: Poor Food Choices, Insufficient Physical Activity, Stress/Emotional " "Eating, Lack of knowledge of appropriate lifestyle changes, and Insufficient time to make appropriate lifestyle changes  Associated symptoms: comorbid conditions, fatigue, increased joint pain, decreased exercise capacity, body image issues, decreased self esteem, increased shortness of breath, decreased mobility, depression, inability to do certain activities, and clothes do not fit    Diet recall:  B: coffee  L: 1/2 sandwich (using 647 bread) OR avocado toast with egg on 647 bread  S: no  D: salmon bites with vegetables and rice  S: has stopped   Take out frequency: not often    Hydration: coffee - SF peppermint mocha, water  Alcohol: no  Smoking: no  Exercise: starting aqua therapy  Occupation:  and [employer] business  Sleep: well - has EULALIO  STOP bang: uses CPAP nightly        The following portions of the patient's history were reviewed and updated as appropriate: allergies, current medications, past family history, past medical history, past social history, past surgical history, and problem list.    Family History[3]     Review of Systems   Constitutional:  Negative for fatigue.   HENT:  Negative for sore throat.    Respiratory:  Negative for cough and shortness of breath.    Cardiovascular:  Negative for chest pain, palpitations and leg swelling.   Gastrointestinal:  Negative for abdominal pain, constipation, diarrhea and nausea.   Genitourinary:  Negative for dysuria.   Musculoskeletal:  Positive for arthralgias. Negative for back pain.   Skin:  Negative for rash.   Neurological:  Negative for headaches.   Psychiatric/Behavioral:  Negative for dysphoric mood. The patient is not nervous/anxious.        Objective:  /70   Pulse 83   Ht 5' 3\" (1.6 m)   Wt 125 kg (274 lb 9.6 oz)   SpO2 98%   BMI 48.64 kg/m²     Physical Exam  Vitals and nursing note reviewed.   Constitutional:       Appearance: Normal appearance. He is obese.   HENT:      Head: Normocephalic.   Pulmonary:      Effort: " Pulmonary effort is normal.     Neurological:      General: No focal deficit present.      Mental Status: He is alert and oriented to person, place, and time.     Psychiatric:         Mood and Affect: Mood normal.         Behavior: Behavior normal.         Thought Content: Thought content normal.         Judgment: Judgment normal.              Labs and Imaging  Recent labs and imaging have been personally reviewed.  Lab Results   Component Value Date    WBC 6.41 07/02/2024    HGB 16.7 07/02/2024    HCT 48.7 07/02/2024    MCV 86 07/02/2024     07/02/2024     Lab Results   Component Value Date    SODIUM 138 07/02/2024    K 4.4 07/02/2024     07/02/2024    CO2 27 07/02/2024    AGAP 8 07/02/2024    BUN 16 07/02/2024    CREATININE 1.06 07/02/2024    GLUC 120 08/29/2019    GLUF 150 (H) 07/02/2024    CALCIUM 9.6 07/02/2024    AST 20 07/02/2024    ALT 32 07/02/2024    ALKPHOS 72 07/02/2024    TP 7.5 07/02/2024    TBILI 0.72 07/02/2024    EGFR 76 07/02/2024     Lab Results   Component Value Date    HGBA1C 7.3 (A) 02/18/2025     Lab Results   Component Value Date    NNF1HHZTMLKI 3.394 01/03/2024     Lab Results   Component Value Date    CHOLESTEROL 224 (H) 07/02/2024     Lab Results   Component Value Date    HDL 43 07/02/2024     Lab Results   Component Value Date    TRIG 256 (H) 07/02/2024     Lab Results   Component Value Date    LDLCALC 130 (H) 07/02/2024          [1]   Past Medical History:  Diagnosis Date    Anxiety     Cyanocobalamin deficiency 03/04/2021    Elevated C-reactive protein (CRP) 03/04/2021    ESR raised 03/04/2021    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    GERD (gastroesophageal reflux disease)     Joint pain in both hands 02/19/2021    Kidney stone     Kidney stones 02/19/2021    Mass of anterior abdominal wall 02/19/2021    LLQ 9x6 cm    MI (myocardial infarction) (HCC)     Morbid obesity with BMI of 50.0-59.9, adult (HCC)     Myocardial infarction (HCC) 06/11/2004    Need for  hepatitis B vaccination 07/15/2021    Need for MMR vaccine 07/15/2021    Nonimmune to hepatitis B virus 03/04/2021    Obesity     Parotid gland enlargement 02/19/2021    left    Prediabetes 03/04/2021    Primary osteoarthritis of left knee 07/15/2021    RUQ pain 07/15/2021    Sarcoidosis 02/19/2021    Sleep apnea     cpap    Soft tissue mass 02/19/2021    R upper back 9x 11x3cm L upper back 14x20x 4 cm   [2]   Past Surgical History:  Procedure Laterality Date    BRONCHOSCOPY      COLONOSCOPY      EGD      HAND SURGERY Bilateral 2019    trigger finger and carpal tunnel     KNEE ARTHROSCOPY      meniscus surgery    [place] EXCISION TUMOR SOFT TIS BACK/FLANK SUBQ 3 CM/> N/A 4/8/2021    Procedure: excision large mass back subfascial  25x 13x 3;  Surgeon: Angel Hubbard MD;  Location: [facility];  Service: General    VASECTOMY     [3]   Family History  Problem Relation Name Age of Onset    Heart disease Mother xin jaime     Diabetes Mother xin jaime     Heart disease Father tati Pinafaele     Cancer Father tati Montaño         blood cancer    Heart disease Paternal Aunt Savanah Ambhartir     Heart disease Paternal Uncle David [place]     Diabetes Maternal Grandfather serakalpeshlizet pinajaime     Stroke Neg Hx

## 2025-05-30 ENCOUNTER — TELEPHONE (OUTPATIENT)
Dept: BARIATRICS | Facility: CLINIC | Age: 60
End: 2025-05-30

## 2025-05-30 NOTE — TELEPHONE ENCOUNTER
Prior authorization for mounjaro Approved  Message from Plan  Request Reference Number: [id number]. MOUNJARO INJ 2.5/0.5 is approved through 05/30/2026. Your patient may now fill this prescription and it will be covered.. Authorization Expiration Date: May 30, 2026.

## 2025-07-30 ENCOUNTER — APPOINTMENT (OUTPATIENT)
Dept: URGENT CARE | Facility: MEDICAL CENTER | Age: 60
End: 2025-07-30

## (undated) DEVICE — SUT ETHILON 3-0 PS-1 18 IN 1663G

## (undated) DEVICE — JP 3-SPRING RES W/10FR PVC DRAIN/TR: Brand: CARDINAL HEALTH

## (undated) DEVICE — OCCLUSIVE GAUZE STRIP,3% BISMUTH TRIBROMOPHENATE IN PETROLATUM BLEND: Brand: XEROFORM

## (undated) DEVICE — SUT MONOCRYL 4-0 PS-2 18 IN Y496G

## (undated) DEVICE — SUT VICRYL 2-0 18 IN J911T

## (undated) DEVICE — SUT VICRYL 3-0 SH 27 IN J416H

## (undated) DEVICE — BETHLEHEM UNIVERSAL MINOR GEN: Brand: CARDINAL HEALTH

## (undated) DEVICE — MEDI-VAC YANK SUCT HNDL W/TPRD BULBOUS TIP: Brand: CARDINAL HEALTH

## (undated) DEVICE — PAD GROUNDING ADULT

## (undated) DEVICE — INTENDED FOR TISSUE SEPARATION, AND OTHER PROCEDURES THAT REQUIRE A SHARP SURGICAL BLADE TO PUNCTURE OR CUT.: Brand: BARD-PARKER SAFETY BLADES SIZE 15, STERILE

## (undated) DEVICE — PLUMEPEN PRO 10FT

## (undated) DEVICE — NEEDLE 25G X 1 1/2

## (undated) DEVICE — CHLORAPREP HI-LITE 26ML ORANGE

## (undated) DEVICE — GLOVE INDICATOR PI UNDERGLOVE SZ 8 BLUE

## (undated) DEVICE — TUBING SUCTION 5MM X 12 FT

## (undated) DEVICE — Device

## (undated) DEVICE — SPONGE LAP 18 X 18 IN

## (undated) DEVICE — SUT VICRYL 2-0 CT-1 27 IN J259H

## (undated) DEVICE — 3M™ TEGADERM™ TRANSPARENT FILM DRESSING FRAME STYLE, 1626W, 4 IN X 4-3/4 IN (10 CM X 12 CM), 50/CT 4CT/CASE: Brand: 3M™ TEGADERM™

## (undated) DEVICE — GLOVE SRG BIOGEL ECLIPSE 7.5